# Patient Record
Sex: FEMALE | Race: WHITE | Employment: UNEMPLOYED | URBAN - METROPOLITAN AREA
[De-identification: names, ages, dates, MRNs, and addresses within clinical notes are randomized per-mention and may not be internally consistent; named-entity substitution may affect disease eponyms.]

---

## 2017-06-26 ENCOUNTER — OFFICE VISIT (OUTPATIENT)
Dept: INTERNAL MEDICINE CLINIC | Facility: CLINIC | Age: 67
End: 2017-06-26

## 2017-06-26 VITALS
WEIGHT: 143.5 LBS | BODY MASS INDEX: 26.75 KG/M2 | HEIGHT: 61.5 IN | DIASTOLIC BLOOD PRESSURE: 76 MMHG | HEART RATE: 78 BPM | OXYGEN SATURATION: 97 % | TEMPERATURE: 98 F | SYSTOLIC BLOOD PRESSURE: 122 MMHG | RESPIRATION RATE: 18 BRPM

## 2017-06-26 DIAGNOSIS — E78.2 MIXED HYPERLIPIDEMIA: ICD-10-CM

## 2017-06-26 DIAGNOSIS — E03.9 HYPOTHYROIDISM (ACQUIRED): Primary | ICD-10-CM

## 2017-06-26 DIAGNOSIS — I10 ESSENTIAL HYPERTENSION: ICD-10-CM

## 2017-06-26 DIAGNOSIS — M48.061 SPINAL STENOSIS AT L4-L5 LEVEL: ICD-10-CM

## 2017-06-26 PROCEDURE — 80061 LIPID PANEL: CPT | Performed by: INTERNAL MEDICINE

## 2017-06-26 PROCEDURE — 80050 GENERAL HEALTH PANEL: CPT | Performed by: INTERNAL MEDICINE

## 2017-06-26 PROCEDURE — 99214 OFFICE O/P EST MOD 30 MIN: CPT | Performed by: INTERNAL MEDICINE

## 2017-06-26 PROCEDURE — 36415 COLL VENOUS BLD VENIPUNCTURE: CPT | Performed by: INTERNAL MEDICINE

## 2017-06-26 RX ORDER — MICONAZOLE NITRATE 20 MG/ML
TINCTURE TOPICAL
Refills: 2 | COMMUNITY
Start: 2017-05-03

## 2017-06-26 RX ORDER — PROPRANOLOL HYDROCHLORIDE 20 MG/1
20 TABLET ORAL 2 TIMES DAILY
Qty: 60 TABLET | Refills: 11 | Status: SHIPPED | OUTPATIENT
Start: 2017-06-26 | End: 2018-02-02

## 2017-06-26 RX ORDER — FLUTICASONE PROPIONATE 50 MCG
2 SPRAY, SUSPENSION (ML) NASAL DAILY
Qty: 1 BOTTLE | Refills: 11 | Status: SHIPPED | OUTPATIENT
Start: 2017-06-26 | End: 2018-07-10

## 2017-06-26 RX ORDER — ALENDRONATE SODIUM 70 MG/1
70 TABLET ORAL WEEKLY
Qty: 4 TABLET | Refills: 11 | Status: SHIPPED | OUTPATIENT
Start: 2017-06-26 | End: 2018-02-02

## 2017-06-26 RX ORDER — ERGOCALCIFEROL 1.25 MG/1
50000 CAPSULE ORAL WEEKLY
Qty: 30 CAPSULE | Refills: 11 | Status: SHIPPED | OUTPATIENT
Start: 2017-06-26 | End: 2018-02-02

## 2017-06-26 RX ORDER — GABAPENTIN 100 MG/1
100 CAPSULE ORAL 3 TIMES DAILY
Qty: 90 CAPSULE | Refills: 6 | Status: SHIPPED | OUTPATIENT
Start: 2017-06-26 | End: 2018-02-07

## 2017-06-26 RX ORDER — TOBRAMYCIN 3 MG/ML
2 SOLUTION/ DROPS OPHTHALMIC
Status: DISCONTINUED | OUTPATIENT
Start: 2017-06-26 | End: 2017-06-30

## 2017-06-26 RX ORDER — IBUPROFEN 400 MG/1
400 TABLET ORAL EVERY 6 HOURS PRN
Qty: 60 TABLET | Refills: 11 | Status: SHIPPED | OUTPATIENT
Start: 2017-06-26

## 2017-06-26 RX ORDER — LEVOTHYROXINE SODIUM 0.07 MG/1
75 TABLET ORAL
Qty: 30 TABLET | Refills: 11 | Status: SHIPPED | OUTPATIENT
Start: 2017-06-26 | End: 2018-02-02

## 2017-06-26 RX ORDER — SIMVASTATIN 10 MG
10 TABLET ORAL NIGHTLY
Qty: 30 TABLET | Refills: 11 | Status: SHIPPED | OUTPATIENT
Start: 2017-06-26 | End: 2018-02-02

## 2017-06-26 RX ORDER — ERYTHROMYCIN 5 MG/G
OINTMENT OPHTHALMIC
COMMUNITY
Start: 2017-06-22 | End: 2017-07-20

## 2017-06-26 RX ORDER — GABAPENTIN 100 MG/1
100 CAPSULE ORAL 3 TIMES DAILY
COMMUNITY
End: 2017-06-26

## 2017-06-26 NOTE — PROGRESS NOTES
HPI:    Patient ID: Sang Esteban is a 77year old female.     HPI    Review of Systems         Current Outpatient Prescriptions:  erythromycin 5 MG/GM Ophthalmic Ointment  Disp:  Rfl:    CHANTIX STARTING MONTH IGNACIA 0.5 MG X 11 & 1 MG X 42 Oral Tab TAKE AS

## 2017-06-26 NOTE — PROGRESS NOTES
HPI:    Patient ID: Art Townsend is a 77year old female. HPI    Review of Systems         Current Outpatient Prescriptions:  fluconazole 150 MG Oral Tab TAKE 1 TABLET (150 MG TOTAL) BY MOUTH ONCE. Disp: 1 tablet Rfl: 3   CHANTIX STARTING MONTH IGNACIA 0. encounter    Imaging & Referrals:  None       SD#[id number]

## 2017-06-26 NOTE — PROGRESS NOTES
HPI:    Patient ID: Sue Luther is a 77year old female. HPIPt here for a fu on hypothyroidism spinal stenosis and osteoporosis. Review of Systems   Constitutional: Positive for fatigue. Negative for unexpected weight change.    HENT: Positive for Cap TAKE ONE CAPSULE BY MOUTH TWICE A DAY Disp:  Rfl: 0   Doxycycline Hyclate (VIBRAMYCIN) 100 MG Oral Cap Take 100 mg by mouth once daily. Disp:  Rfl: 1   aspirin 81 MG Oral Tab Take 81 mg by mouth daily.  Disp:  Rfl:    FUNGOID TINCTURE 2 % External Solut Visit:  Signed Prescriptions Disp Refills    Alendronate Sodium 70 MG Oral Tab 4 tablet 11      Sig: Take 1 tablet (70 mg total) by mouth once a week.       ergocalciferol 43242 units Oral Cap 30 capsule 11      Sig: Take 1 capsule (50,000 Units total) by m

## 2017-06-26 NOTE — PROGRESS NOTES
HPI:    Patient ID: Tye Guillory is a 77year old female. HPI    Review of Systems         Current Outpatient Prescriptions:  fluconazole 150 MG Oral Tab TAKE 1 TABLET (150 MG TOTAL) BY MOUTH ONCE. Disp: 1 tablet Rfl: 3   CHANTIX STARTING MONTH IGNACIA 0. encounter    Imaging & Referrals:  None       #[id number]

## 2017-07-20 RX ORDER — ERYTHROMYCIN 5 MG/G
1 OINTMENT OPHTHALMIC EVERY 6 HOURS
Qty: 1 TUBE | Refills: 3 | Status: SHIPPED | OUTPATIENT
Start: 2017-07-20 | End: 2018-08-14

## 2017-07-20 NOTE — TELEPHONE ENCOUNTER
Pt's daughter is calling to say that the Dr sent over the eye drops med to the Pharmacy / [facility].

## 2018-01-15 PROBLEM — H00.019 HORDEOLUM: Status: ACTIVE | Noted: 2017-06-22

## 2018-02-02 ENCOUNTER — OFFICE VISIT (OUTPATIENT)
Dept: INTERNAL MEDICINE CLINIC | Facility: CLINIC | Age: 68
End: 2018-02-02

## 2018-02-02 VITALS
HEIGHT: 61.5 IN | RESPIRATION RATE: 17 BRPM | OXYGEN SATURATION: 98 % | BODY MASS INDEX: 26.21 KG/M2 | TEMPERATURE: 98 F | DIASTOLIC BLOOD PRESSURE: 76 MMHG | WEIGHT: 140.63 LBS | SYSTOLIC BLOOD PRESSURE: 128 MMHG | HEART RATE: 69 BPM

## 2018-02-02 DIAGNOSIS — E03.9 ACQUIRED HYPOTHYROIDISM: ICD-10-CM

## 2018-02-02 DIAGNOSIS — E78.2 MIXED HYPERLIPIDEMIA: ICD-10-CM

## 2018-02-02 DIAGNOSIS — M54.17 LUMBOSACRAL RADICULOPATHY: Primary | ICD-10-CM

## 2018-02-02 DIAGNOSIS — I10 ESSENTIAL HYPERTENSION: ICD-10-CM

## 2018-02-02 DIAGNOSIS — E55.9 VITAMIN D DEFICIENCY: ICD-10-CM

## 2018-02-02 PROBLEM — H00.019 HORDEOLUM: Status: RESOLVED | Noted: 2017-06-22 | Resolved: 2018-02-02

## 2018-02-02 LAB
ALBUMIN SERPL BCP-MCNC: 3.8 G/DL (ref 3.5–4.8)
ALBUMIN/GLOB SERPL: 1.5 {RATIO} (ref 1–2)
ALP SERPL-CCNC: 50 U/L (ref 32–100)
ALT SERPL-CCNC: 13 U/L (ref 14–54)
ANION GAP SERPL CALC-SCNC: 8 MMOL/L (ref 0–18)
AST SERPL-CCNC: 15 U/L (ref 15–41)
BASOPHILS # BLD: 0.1 K/UL (ref 0–0.2)
BASOPHILS NFR BLD: 1 %
BILIRUB SERPL-MCNC: 0.5 MG/DL (ref 0.3–1.2)
BUN SERPL-MCNC: 10 MG/DL (ref 8–20)
BUN/CREAT SERPL: 16.7 (ref 10–20)
CALCIUM SERPL-MCNC: 9.2 MG/DL (ref 8.5–10.5)
CHLORIDE SERPL-SCNC: 108 MMOL/L (ref 95–110)
CHOLEST SERPL-MCNC: 204 MG/DL (ref 110–200)
CO2 SERPL-SCNC: 24 MMOL/L (ref 22–32)
CREAT SERPL-MCNC: 0.6 MG/DL (ref 0.5–1.5)
EOSINOPHIL # BLD: 0 K/UL (ref 0–0.7)
EOSINOPHIL NFR BLD: 1 %
ERYTHROCYTE [DISTWIDTH] IN BLOOD BY AUTOMATED COUNT: 13.1 % (ref 11–15)
GLOBULIN PLAS-MCNC: 2.5 G/DL (ref 2.5–3.7)
GLUCOSE SERPL-MCNC: 99 MG/DL (ref 70–99)
HCT VFR BLD AUTO: 44.4 % (ref 35–48)
HDLC SERPL-MCNC: 36 MG/DL
HGB BLD-MCNC: 14.9 G/DL (ref 12–16)
LDLC SERPL CALC-MCNC: 129 MG/DL (ref 0–99)
LYMPHOCYTES # BLD: 1.2 K/UL (ref 1–4)
LYMPHOCYTES NFR BLD: 19 %
MCH RBC QN AUTO: 31.2 PG (ref 27–32)
MCHC RBC AUTO-ENTMCNC: 33.6 G/DL (ref 32–37)
MCV RBC AUTO: 93 FL (ref 80–100)
MONOCYTES # BLD: 0.5 K/UL (ref 0–1)
MONOCYTES NFR BLD: 8 %
NEUTROPHILS # BLD AUTO: 4.3 K/UL (ref 1.8–7.7)
NEUTROPHILS NFR BLD: 71 %
NONHDLC SERPL-MCNC: 168 MG/DL
OSMOLALITY UR CALC.SUM OF ELEC: 289 MOSM/KG (ref 275–295)
PLATELET # BLD AUTO: 214 K/UL (ref 140–400)
PMV BLD AUTO: 8.9 FL (ref 7.4–10.3)
POTASSIUM SERPL-SCNC: 4.6 MMOL/L (ref 3.3–5.1)
PROT SERPL-MCNC: 6.3 G/DL (ref 5.9–8.4)
RBC # BLD AUTO: 4.77 M/UL (ref 3.7–5.4)
SODIUM SERPL-SCNC: 140 MMOL/L (ref 136–144)
TRIGL SERPL-MCNC: 194 MG/DL (ref 1–149)
TSH SERPL-ACNC: 3.13 UIU/ML (ref 0.45–5.33)
WBC # BLD AUTO: 6 K/UL (ref 4–11)

## 2018-02-02 PROCEDURE — 99214 OFFICE O/P EST MOD 30 MIN: CPT | Performed by: INTERNAL MEDICINE

## 2018-02-02 PROCEDURE — 80050 GENERAL HEALTH PANEL: CPT | Performed by: INTERNAL MEDICINE

## 2018-02-02 PROCEDURE — 82306 VITAMIN D 25 HYDROXY: CPT | Performed by: INTERNAL MEDICINE

## 2018-02-02 PROCEDURE — 80061 LIPID PANEL: CPT | Performed by: INTERNAL MEDICINE

## 2018-02-02 PROCEDURE — 36415 COLL VENOUS BLD VENIPUNCTURE: CPT | Performed by: INTERNAL MEDICINE

## 2018-02-02 RX ORDER — PROPRANOLOL HYDROCHLORIDE 20 MG/1
20 TABLET ORAL 2 TIMES DAILY
Qty: 60 TABLET | Refills: 11 | Status: SHIPPED | OUTPATIENT
Start: 2018-02-02 | End: 2018-11-20

## 2018-02-02 RX ORDER — ERGOCALCIFEROL 1.25 MG/1
50000 CAPSULE ORAL WEEKLY
Qty: 30 CAPSULE | Refills: 11 | Status: SHIPPED | OUTPATIENT
Start: 2018-02-02 | End: 2018-08-14

## 2018-02-02 RX ORDER — LEVOTHYROXINE SODIUM 0.07 MG/1
75 TABLET ORAL
Qty: 30 TABLET | Refills: 11 | Status: SHIPPED | OUTPATIENT
Start: 2018-02-02 | End: 2018-11-20

## 2018-02-02 RX ORDER — ALENDRONATE SODIUM 70 MG/1
70 TABLET ORAL WEEKLY
Qty: 4 TABLET | Refills: 11 | Status: SHIPPED | OUTPATIENT
Start: 2018-02-02 | End: 2018-08-14

## 2018-02-02 RX ORDER — SIMVASTATIN 10 MG
10 TABLET ORAL NIGHTLY
Qty: 30 TABLET | Refills: 11 | Status: SHIPPED | OUTPATIENT
Start: 2018-02-02 | End: 2018-11-20

## 2018-02-02 NOTE — PROGRESS NOTES
HPI:    Patient ID: Palmira Krabbe is a 79year old female. Review of Systems         Current Outpatient Prescriptions:  erythromycin 5 MG/GM Ophthalmic Ointment Place 1 Application into both eyes every 6 (six) hours.  Disp: 1 Tube Rfl: 3   FUNGOID T Visit:  No prescriptions requested or ordered in this encounter    Imaging & Referrals:  None         TIME COMPONENT:       Vargas Lange MD  2/2/2018

## 2018-02-02 NOTE — PROGRESS NOTES
Pt's  verified  Pt presented for a fasting blood draw. Per Dr Bambi Steinberg ordered VIT D TSHR CBC LIPID CMP. Pt tolerated venipuncture well,specimens drawn from RT  arm  and sent out to [address] [place] [address] lab for testing.

## 2018-02-02 NOTE — PROGRESS NOTES
HPI:    Patient ID: Beny Trevino is a 79year old female. Pt here for fu on spinal stenosis having flare upn currnetly. [facility] fu on hypothyroidism  htn and lipids. Last imaging of the spine was in 2009.     Review of Systems   Constitutional: total) by mouth every 6 (six) hours as needed for Pain.  Disp: 60 tablet Rfl: 11   Levothyroxine Sodium 75 MCG Oral Tab Take 1 tablet (75 mcg total) by mouth before breakfast. Disp: 30 tablet Rfl: 11   Propranolol HCl 20 MG Oral Tab Take 1 tablet (20 mg tot diagnosis) get new Mri imaging  And refer to pain clinic  Essential hypertension controlled with inderal rx  Mixed hyperlipidemia on zocor  Acquired hypothyroidism on levothyroxine.       Orders Placed This Encounter      Comp Metabolic Panel (14) [E]

## 2018-02-05 LAB — 25(OH)D3 SERPL-MCNC: 30.2 NG/ML

## 2018-02-07 RX ORDER — GABAPENTIN 100 MG/1
CAPSULE ORAL
Qty: 90 CAPSULE | Refills: 0 | Status: SHIPPED | OUTPATIENT
Start: 2018-02-07 | End: 2018-03-13

## 2018-02-12 ENCOUNTER — TELEPHONE (OUTPATIENT)
Dept: INTERNAL MEDICINE CLINIC | Facility: CLINIC | Age: 68
End: 2018-02-12

## 2018-02-12 NOTE — TELEPHONE ENCOUNTER
Spoke with the patient daughter regarding the patient issue.  Dr Bambi Steinberg had ordered an MRI, The patient received a denial letter from the insurance regarding no coverage os the requested test.

## 2018-02-12 NOTE — TELEPHONE ENCOUNTER
Patient's daughter is requesting  Call back from Dr. Yoana Son about prior authorization. [phone].

## 2018-02-12 NOTE — TELEPHONE ENCOUNTER
Sent message to the referral department for feedback from the insurance will follow up with their response with Dr Leopold Bunk

## 2018-03-02 ENCOUNTER — TELEPHONE (OUTPATIENT)
Dept: INTERNAL MEDICINE CLINIC | Facility: CLINIC | Age: 68
End: 2018-03-02

## 2018-03-02 NOTE — TELEPHONE ENCOUNTER
Patient's daughter states an MRI was not approved by insurance, she wants to know if the doctor can just order an x-ray, please advise. She asked if Dr Blossom Martinez could order it.

## 2018-03-02 NOTE — TELEPHONE ENCOUNTER
Pt's  verified  Called Pt and spoke with daughter Luna Braun re: covering MD not in agreement to ordering xray since they have not seen Pt for this issue- informed her I would ask Temi Thompson about this when she return to the office on Tuesday and will rell

## 2018-03-07 ENCOUNTER — TELEPHONE (OUTPATIENT)
Dept: INTERNAL MEDICINE CLINIC | Facility: CLINIC | Age: 68
End: 2018-03-07

## 2018-03-07 DIAGNOSIS — M54.50 LUMBAR PAIN: Primary | ICD-10-CM

## 2018-03-13 ENCOUNTER — TELEPHONE (OUTPATIENT)
Dept: INTERNAL MEDICINE CLINIC | Facility: CLINIC | Age: 68
End: 2018-03-13

## 2018-03-13 RX ORDER — GABAPENTIN 100 MG/1
CAPSULE ORAL
Qty: 90 CAPSULE | Refills: 0 | Status: SHIPPED | OUTPATIENT
Start: 2018-03-13 | End: 2018-06-19

## 2018-03-13 NOTE — TELEPHONE ENCOUNTER
Dtr called wanting for xr lumbar [facility].  Order faxed per dtr request.

## 2018-03-16 ENCOUNTER — TELEPHONE (OUTPATIENT)
Dept: INTERNAL MEDICINE CLINIC | Facility: CLINIC | Age: 68
End: 2018-03-16

## 2018-03-16 DIAGNOSIS — M47.16 OTHER SPONDYLOSIS WITH MYELOPATHY, LUMBAR REGION: Primary | ICD-10-CM

## 2018-03-27 ENCOUNTER — APPOINTMENT (OUTPATIENT)
Dept: PHYSICAL THERAPY | Age: 68
End: 2018-03-27
Attending: INTERNAL MEDICINE
Payer: MEDICARE

## 2018-03-28 ENCOUNTER — TELEPHONE (OUTPATIENT)
Dept: INTERNAL MEDICINE CLINIC | Facility: CLINIC | Age: 68
End: 2018-03-28

## 2018-03-28 NOTE — TELEPHONE ENCOUNTER
Patient's daughter states she has back and hip pain. X-rays on file, needs to be seen. Please advise.

## 2018-03-28 NOTE — TELEPHONE ENCOUNTER
Pt's  verified  Called Pt's daughter per Dr. Sade Vazquez and offered to add to schedule for 1130 am today  - she refused stating she has an appt with another MD today- I offered to schedule to be seen with Dr. Zahra Tee tomorrow at 56 or 1140 am since Pt's d

## 2018-04-03 ENCOUNTER — APPOINTMENT (OUTPATIENT)
Dept: PHYSICAL THERAPY | Age: 68
End: 2018-04-03
Attending: INTERNAL MEDICINE
Payer: MEDICARE

## 2018-04-05 ENCOUNTER — APPOINTMENT (OUTPATIENT)
Dept: PHYSICAL THERAPY | Age: 68
End: 2018-04-05
Attending: INTERNAL MEDICINE
Payer: MEDICARE

## 2018-04-10 ENCOUNTER — APPOINTMENT (OUTPATIENT)
Dept: PHYSICAL THERAPY | Age: 68
End: 2018-04-10
Attending: INTERNAL MEDICINE
Payer: MEDICARE

## 2018-04-12 ENCOUNTER — APPOINTMENT (OUTPATIENT)
Dept: PHYSICAL THERAPY | Age: 68
End: 2018-04-12
Attending: INTERNAL MEDICINE
Payer: MEDICARE

## 2018-04-17 ENCOUNTER — APPOINTMENT (OUTPATIENT)
Dept: PHYSICAL THERAPY | Age: 68
End: 2018-04-17
Attending: INTERNAL MEDICINE
Payer: MEDICARE

## 2018-04-19 ENCOUNTER — APPOINTMENT (OUTPATIENT)
Dept: PHYSICAL THERAPY | Age: 68
End: 2018-04-19
Attending: INTERNAL MEDICINE
Payer: MEDICARE

## 2018-04-24 ENCOUNTER — APPOINTMENT (OUTPATIENT)
Dept: PHYSICAL THERAPY | Age: 68
End: 2018-04-24
Attending: INTERNAL MEDICINE
Payer: MEDICARE

## 2018-06-19 ENCOUNTER — OFFICE VISIT (OUTPATIENT)
Dept: INTERNAL MEDICINE CLINIC | Facility: CLINIC | Age: 68
End: 2018-06-19

## 2018-06-19 VITALS
BODY MASS INDEX: 26.47 KG/M2 | WEIGHT: 142 LBS | TEMPERATURE: 98 F | HEIGHT: 61.5 IN | RESPIRATION RATE: 17 BRPM | HEART RATE: 62 BPM | SYSTOLIC BLOOD PRESSURE: 132 MMHG | OXYGEN SATURATION: 98 % | DIASTOLIC BLOOD PRESSURE: 66 MMHG

## 2018-06-19 DIAGNOSIS — E03.8 OTHER SPECIFIED HYPOTHYROIDISM: ICD-10-CM

## 2018-06-19 DIAGNOSIS — M54.17 LUMBOSACRAL RADICULOPATHY: ICD-10-CM

## 2018-06-19 DIAGNOSIS — E78.2 MIXED HYPERLIPIDEMIA: Primary | ICD-10-CM

## 2018-06-19 DIAGNOSIS — K21.9 GASTROESOPHAGEAL REFLUX DISEASE WITHOUT ESOPHAGITIS: ICD-10-CM

## 2018-06-19 DIAGNOSIS — R05.9 COUGH IN ADULT PATIENT: ICD-10-CM

## 2018-06-19 PROCEDURE — 80053 COMPREHEN METABOLIC PANEL: CPT | Performed by: INTERNAL MEDICINE

## 2018-06-19 PROCEDURE — 84443 ASSAY THYROID STIM HORMONE: CPT | Performed by: INTERNAL MEDICINE

## 2018-06-19 PROCEDURE — 99214 OFFICE O/P EST MOD 30 MIN: CPT | Performed by: INTERNAL MEDICINE

## 2018-06-19 PROCEDURE — 80061 LIPID PANEL: CPT | Performed by: INTERNAL MEDICINE

## 2018-06-19 PROCEDURE — 36415 COLL VENOUS BLD VENIPUNCTURE: CPT | Performed by: INTERNAL MEDICINE

## 2018-06-19 RX ORDER — HYDROCODONE BITARTRATE AND ACETAMINOPHEN 5; 325 MG/1; MG/1
1 TABLET ORAL EVERY 6 HOURS PRN
Qty: 30 TABLET | Refills: 0 | Status: SHIPPED | OUTPATIENT
Start: 2018-06-19 | End: 2021-03-05

## 2018-06-19 RX ORDER — ASPIRIN 81 MG/1
81 TABLET ORAL DAILY
Qty: 30 TABLET | Refills: 11 | Status: SHIPPED | OUTPATIENT
Start: 2018-06-19 | End: 2018-11-20

## 2018-06-19 RX ORDER — OMEPRAZOLE 20 MG/1
20 CAPSULE, DELAYED RELEASE ORAL
Qty: 30 CAPSULE | Refills: 3 | Status: SHIPPED | OUTPATIENT
Start: 2018-06-19 | End: 2018-10-04

## 2018-06-19 NOTE — PROGRESS NOTES
Pt's  verified  Pt presented for a fasting blood draw. Per DR Stephanie Arrington ordered TSHR CMP LIPID. Pt tolerated venipuncture well,specimens drawn from RT  arm  and sent out to [address] [place] [address] lab for testing.

## 2018-06-19 NOTE — PROGRESS NOTES
HPI:    Patient ID: Corona Aparicio is a 79year old female. Pt here for fu on severe lumbar radiculopathy- has had a plain film xray and 6 weeks of PT. Has only had minimal improvement. Never took Gabapentin rx due to irrational fear of side effects. tablet Rfl: 11   erythromycin 5 MG/GM Ophthalmic Ointment Place 1 Application into both eyes every 6 (six) hours.  Disp: 1 Tube Rfl: 3   FUNGOID TINCTURE 2 % External Solution APPLY AA D Disp:  Rfl: 2   Fluticasone Propionate 50 MCG/ACT Nasal Suspension 2 s patient check cxr - smoking cessation discussed w pt  Gastroesophageal reflux disease without esophagitis omeprazole 20 mg daily    Orders Placed This Encounter      Comp Metabolic Panel (14) [E]      Lipid Panel [E]      TSH W Reflex To Free T4 [E]    Med

## 2018-07-10 RX ORDER — FLUTICASONE PROPIONATE 50 MCG
SPRAY, SUSPENSION (ML) NASAL
Qty: 2 BOTTLE | Refills: 2 | Status: SHIPPED | OUTPATIENT
Start: 2018-07-10 | End: 2019-11-24

## 2018-07-11 RX ORDER — SIMVASTATIN 10 MG
TABLET ORAL
Qty: 30 TABLET | Refills: 0 | Status: SHIPPED | OUTPATIENT
Start: 2018-07-11 | End: 2018-08-14

## 2018-07-11 RX ORDER — LEVOTHYROXINE SODIUM 0.07 MG/1
TABLET ORAL
Qty: 30 TABLET | Refills: 0 | Status: SHIPPED | OUTPATIENT
Start: 2018-07-11 | End: 2018-08-14

## 2018-07-11 RX ORDER — PROPRANOLOL HYDROCHLORIDE 20 MG/1
TABLET ORAL
Qty: 60 TABLET | Refills: 0 | Status: SHIPPED | OUTPATIENT
Start: 2018-07-11 | End: 2018-08-14

## 2018-07-11 RX ORDER — ALENDRONATE SODIUM 70 MG/1
TABLET ORAL
Qty: 4 TABLET | Refills: 0 | Status: SHIPPED | OUTPATIENT
Start: 2018-07-11 | End: 2018-11-20

## 2018-08-10 ENCOUNTER — TELEPHONE (OUTPATIENT)
Dept: INTERNAL MEDICINE CLINIC | Facility: CLINIC | Age: 68
End: 2018-08-10

## 2018-08-10 NOTE — TELEPHONE ENCOUNTER
Daughter calling says mother is not feeling well an needs to see Dr. Rachel East says she does not want her to see another doctor, informed her Dr. Rachel East is not available to see her daughter said \"can you squeeze her in\" informed her I am not authorized to d

## 2018-08-14 ENCOUNTER — OFFICE VISIT (OUTPATIENT)
Dept: INTERNAL MEDICINE CLINIC | Facility: CLINIC | Age: 68
End: 2018-08-14
Payer: MEDICAID

## 2018-08-14 VITALS
RESPIRATION RATE: 12 BRPM | HEIGHT: 61.5 IN | SYSTOLIC BLOOD PRESSURE: 138 MMHG | DIASTOLIC BLOOD PRESSURE: 86 MMHG | BODY MASS INDEX: 25.35 KG/M2 | WEIGHT: 136 LBS | OXYGEN SATURATION: 99 % | TEMPERATURE: 98 F | HEART RATE: 64 BPM

## 2018-08-14 DIAGNOSIS — E55.9 VITAMIN D DEFICIENCY: ICD-10-CM

## 2018-08-14 DIAGNOSIS — E03.9 ACQUIRED HYPOTHYROIDISM: ICD-10-CM

## 2018-08-14 DIAGNOSIS — M54.17 LUMBOSACRAL RADICULOPATHY: Primary | ICD-10-CM

## 2018-08-14 PROCEDURE — 99213 OFFICE O/P EST LOW 20 MIN: CPT | Performed by: INTERNAL MEDICINE

## 2018-08-14 RX ORDER — ERGOCALCIFEROL 1.25 MG/1
50000 CAPSULE ORAL WEEKLY
Qty: 30 CAPSULE | Refills: 11 | Status: SHIPPED | OUTPATIENT
Start: 2018-08-14 | End: 2019-09-08

## 2018-08-14 NOTE — PROGRESS NOTES
HPI:    Patient ID: Salvador Brunson is a 79year old female. Pt here for fu on lumbar radiculopathy. Has yet to have Mri of lumbar spine due to insurance problems. Has had little relief from hydrocodone rx.   Would like to get a handicapped licence plac total) by mouth daily. Disp: 30 tablet Rfl: 11   HYDROcodone-acetaminophen (NORCO) 5-325 MG Oral Tab Take 1 tablet by mouth every 6 (six) hours as needed for Pain.  Disp: 30 tablet Rfl: 0   simvastatin 10 MG Oral Tab Take 1 tablet (10 mg total) by mouth nig ergocalciferol 27108 units Oral Cap 30 capsule 11      Sig: Take 1 capsule (50,000 Units total) by mouth once a week.            Imaging & Referrals:  None               Leona Ulloa MD  8/14/2018

## 2018-08-17 ENCOUNTER — TELEPHONE (OUTPATIENT)
Dept: INTERNAL MEDICINE CLINIC | Facility: CLINIC | Age: 68
End: 2018-08-17

## 2018-08-17 NOTE — TELEPHONE ENCOUNTER
Patient's daughter was here for an office visit Gene Morales and she said that Dr. Josiah Habermann did not call in medication for patient Leeanntanner Jody, please call in medication to pharmacy.

## 2018-10-05 ENCOUNTER — TELEPHONE (OUTPATIENT)
Dept: INTERNAL MEDICINE CLINIC | Facility: CLINIC | Age: 68
End: 2018-10-05

## 2018-10-05 RX ORDER — OMEPRAZOLE 20 MG/1
CAPSULE, DELAYED RELEASE ORAL
Qty: 30 CAPSULE | Refills: 0 | Status: SHIPPED | OUTPATIENT
Start: 2018-10-05 | End: 2018-11-13

## 2018-10-10 NOTE — TELEPHONE ENCOUNTER
GLENN SETH (Key: [id number])  Omeprazole 20MG OR TBEC  Status: PA Response - Denied    Created: October 5th, 2018    Sent: October 5th, 2018    Open  Archive

## 2018-11-12 RX ORDER — ALENDRONATE SODIUM 70 MG/1
TABLET ORAL
Qty: 4 TABLET | Refills: 0 | Status: SHIPPED | OUTPATIENT
Start: 2018-11-12 | End: 2018-11-20

## 2018-11-13 ENCOUNTER — TELEPHONE (OUTPATIENT)
Dept: INTERNAL MEDICINE CLINIC | Facility: CLINIC | Age: 68
End: 2018-11-13

## 2018-11-13 NOTE — TELEPHONE ENCOUNTER
Plan does not cover Omeprazole 20 mg. Would you like to change to a different medication or start PA?

## 2018-11-20 ENCOUNTER — OFFICE VISIT (OUTPATIENT)
Dept: INTERNAL MEDICINE CLINIC | Facility: CLINIC | Age: 68
End: 2018-11-20
Payer: MEDICAID

## 2018-11-20 VITALS
BODY MASS INDEX: 25.91 KG/M2 | WEIGHT: 139 LBS | TEMPERATURE: 98 F | SYSTOLIC BLOOD PRESSURE: 118 MMHG | OXYGEN SATURATION: 99 % | DIASTOLIC BLOOD PRESSURE: 76 MMHG | HEART RATE: 59 BPM | HEIGHT: 61.5 IN

## 2018-11-20 DIAGNOSIS — E03.8 OTHER SPECIFIED HYPOTHYROIDISM: ICD-10-CM

## 2018-11-20 DIAGNOSIS — E78.2 MIXED HYPERLIPIDEMIA: ICD-10-CM

## 2018-11-20 DIAGNOSIS — M25.511 ACUTE PAIN OF RIGHT SHOULDER: Primary | ICD-10-CM

## 2018-11-20 PROCEDURE — 99214 OFFICE O/P EST MOD 30 MIN: CPT | Performed by: INTERNAL MEDICINE

## 2018-11-20 RX ORDER — PROPRANOLOL HYDROCHLORIDE 20 MG/1
20 TABLET ORAL 2 TIMES DAILY
Qty: 60 TABLET | Refills: 11 | Status: SHIPPED | OUTPATIENT
Start: 2018-11-20 | End: 2019-11-23

## 2018-11-20 RX ORDER — ALENDRONATE SODIUM 70 MG/1
70 TABLET ORAL
Qty: 4 TABLET | Refills: 11 | Status: SHIPPED | OUTPATIENT
Start: 2018-11-20 | End: 2019-11-23

## 2018-11-20 RX ORDER — SIMVASTATIN 10 MG
10 TABLET ORAL NIGHTLY
Qty: 30 TABLET | Refills: 11 | Status: SHIPPED | OUTPATIENT
Start: 2018-11-20 | End: 2019-11-23

## 2018-11-20 RX ORDER — LEVOTHYROXINE SODIUM 0.07 MG/1
75 TABLET ORAL
Qty: 30 TABLET | Refills: 11 | Status: SHIPPED | OUTPATIENT
Start: 2018-11-20 | End: 2019-11-23

## 2018-11-20 RX ORDER — ASPIRIN 81 MG/1
81 TABLET ORAL DAILY
Qty: 30 TABLET | Refills: 11 | Status: SHIPPED | OUTPATIENT
Start: 2018-11-20 | End: 2019-10-21

## 2018-11-20 NOTE — PROGRESS NOTES
HPI:    Patient ID: Palmira Krabbe is a 79year old female. Pt here for localized pain in the right subacromial region for 10 days. Began after some lifting. Has tried some warming adhesive patches with partial relief.     Review of Systems   Constitut (NORCO) 5-325 MG Oral Tab Take 1 tablet by mouth every 6 (six) hours as needed for Pain. Disp: 30 tablet Rfl: 0   simvastatin 10 MG Oral Tab Take 1 tablet (10 mg total) by mouth nightly.  Disp: 30 tablet Rfl: 11   Propranolol HCl 20 MG Oral Tab Take 1 table Prescriptions      No prescriptions requested or ordered in this encounter       Imaging & Referrals:  None                Vargas Lange MD  11/20/2018

## 2018-12-05 ENCOUNTER — HOSPITAL ENCOUNTER (OUTPATIENT)
Dept: GENERAL RADIOLOGY | Age: 68
Discharge: HOME | End: 2018-12-05
Attending: INTERNAL MEDICINE
Payer: MEDICAID

## 2018-12-05 ENCOUNTER — APPOINTMENT (OUTPATIENT)
Dept: LAB | Age: 68
End: 2018-12-05
Attending: INTERNAL MEDICINE
Payer: MEDICAID

## 2018-12-05 DIAGNOSIS — M25.511 ACUTE PAIN OF RIGHT SHOULDER: ICD-10-CM

## 2018-12-05 DIAGNOSIS — E03.8 OTHER SPECIFIED HYPOTHYROIDISM: ICD-10-CM

## 2018-12-05 DIAGNOSIS — E78.2 MIXED HYPERLIPIDEMIA: ICD-10-CM

## 2018-12-05 PROCEDURE — 84443 ASSAY THYROID STIM HORMONE: CPT

## 2018-12-05 PROCEDURE — 80061 LIPID PANEL: CPT

## 2018-12-05 PROCEDURE — 36415 COLL VENOUS BLD VENIPUNCTURE: CPT

## 2018-12-05 PROCEDURE — 73030 X-RAY EXAM OF SHOULDER: CPT | Performed by: INTERNAL MEDICINE

## 2019-01-04 ENCOUNTER — OFFICE VISIT (OUTPATIENT)
Dept: INTERNAL MEDICINE CLINIC | Facility: CLINIC | Age: 69
End: 2019-01-04
Payer: MEDICAID

## 2019-01-04 VITALS
HEIGHT: 61.5 IN | HEART RATE: 62 BPM | TEMPERATURE: 98 F | BODY MASS INDEX: 26.09 KG/M2 | DIASTOLIC BLOOD PRESSURE: 76 MMHG | SYSTOLIC BLOOD PRESSURE: 130 MMHG | WEIGHT: 140 LBS | OXYGEN SATURATION: 98 %

## 2019-01-04 DIAGNOSIS — M54.17 LUMBOSACRAL RADICULOPATHY: Primary | ICD-10-CM

## 2019-01-04 DIAGNOSIS — M15.9 GENERALIZED OSTEOARTHRITIS: ICD-10-CM

## 2019-01-04 DIAGNOSIS — E03.9 ACQUIRED HYPOTHYROIDISM: ICD-10-CM

## 2019-01-04 PROCEDURE — 99214 OFFICE O/P EST MOD 30 MIN: CPT | Performed by: INTERNAL MEDICINE

## 2019-01-04 RX ORDER — CYCLOBENZAPRINE HCL 10 MG
10 TABLET ORAL NIGHTLY
Qty: 30 TABLET | Refills: 3 | Status: SHIPPED | OUTPATIENT
Start: 2019-01-04 | End: 2019-01-24

## 2019-01-04 RX ORDER — ALBUTEROL SULFATE 90 UG/1
2 AEROSOL, METERED RESPIRATORY (INHALATION) EVERY 4 HOURS PRN
Qty: 1 INHALER | Refills: 6 | Status: SHIPPED | OUTPATIENT
Start: 2019-01-04 | End: 2020-05-20

## 2019-01-04 NOTE — PROGRESS NOTES
HPI:    Patient ID: Kathrine Wilde is a 76year old female. Pt here for evaluation of generalized joint aches in the hips and right shoulder. The pains respond to Diclofenac. Has been having difficulty with sleep due to pains.   Has known OA throughout Sodium 40 MG Oral Tab EC Take 1 tablet (40 mg total) by mouth every morning before breakfast. Disp: 30 tablet Rfl: 6   ergocalciferol 15249 units Oral Cap Take 1 capsule (50,000 Units total) by mouth once a week.  Disp: 30 capsule Rfl: 11   FLUTICASONE PROP encounter.       Meds This Visit:  Requested Prescriptions      No prescriptions requested or ordered in this encounter       Imaging & Referrals:  None             Myron Mace MD  1/4/2019

## 2019-01-18 ENCOUNTER — OFFICE VISIT (OUTPATIENT)
Dept: INTERNAL MEDICINE CLINIC | Facility: CLINIC | Age: 69
End: 2019-01-18
Payer: COMMERCIAL

## 2019-01-18 VITALS
OXYGEN SATURATION: 98 % | HEART RATE: 83 BPM | DIASTOLIC BLOOD PRESSURE: 78 MMHG | BODY MASS INDEX: 26.47 KG/M2 | SYSTOLIC BLOOD PRESSURE: 124 MMHG | TEMPERATURE: 98 F | WEIGHT: 142 LBS | HEIGHT: 61.5 IN

## 2019-01-18 DIAGNOSIS — S20.211D CONTUSION OF RIGHT CHEST WALL, SUBSEQUENT ENCOUNTER: Primary | ICD-10-CM

## 2019-01-18 PROBLEM — S20.211A CONTUSION OF RIGHT CHEST WALL: Status: ACTIVE | Noted: 2019-01-18

## 2019-01-18 PROCEDURE — 99213 OFFICE O/P EST LOW 20 MIN: CPT | Performed by: INTERNAL MEDICINE

## 2019-01-18 RX ORDER — HYDROCODONE BITARTRATE AND ACETAMINOPHEN 5; 325 MG/1; MG/1
1 TABLET ORAL EVERY 6 HOURS PRN
Qty: 60 TABLET | Refills: 0 | Status: SHIPPED | OUTPATIENT
Start: 2019-01-18 | End: 2019-03-01
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
## 2019-02-01 ENCOUNTER — OFFICE VISIT (OUTPATIENT)
Dept: INTERNAL MEDICINE CLINIC | Facility: CLINIC | Age: 69
End: 2019-02-01

## 2019-02-01 VITALS
HEART RATE: 67 BPM | OXYGEN SATURATION: 100 % | HEIGHT: 61.5 IN | DIASTOLIC BLOOD PRESSURE: 80 MMHG | BODY MASS INDEX: 26.47 KG/M2 | WEIGHT: 142 LBS | SYSTOLIC BLOOD PRESSURE: 124 MMHG

## 2019-02-01 DIAGNOSIS — S20.211D CONTUSION OF RIGHT CHEST WALL, SUBSEQUENT ENCOUNTER: Primary | ICD-10-CM

## 2019-02-01 PROCEDURE — 99214 OFFICE O/P EST MOD 30 MIN: CPT | Performed by: INTERNAL MEDICINE

## 2019-02-01 RX ORDER — TRAMADOL HYDROCHLORIDE 50 MG/1
50 TABLET ORAL EVERY 6 HOURS PRN
Qty: 40 TABLET | Refills: 1 | Status: SHIPPED | OUTPATIENT
Start: 2019-02-01 | End: 2019-03-01
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
## 2019-03-01 ENCOUNTER — OFFICE VISIT (OUTPATIENT)
Dept: INTERNAL MEDICINE CLINIC | Facility: CLINIC | Age: 69
End: 2019-03-01

## 2019-03-01 VITALS
WEIGHT: 139 LBS | DIASTOLIC BLOOD PRESSURE: 80 MMHG | OXYGEN SATURATION: 98 % | HEART RATE: 69 BPM | HEIGHT: 61.5 IN | SYSTOLIC BLOOD PRESSURE: 116 MMHG | BODY MASS INDEX: 25.91 KG/M2

## 2019-03-01 DIAGNOSIS — S40.011D CONTUSION OF MULTIPLE SITES OF RIGHT SHOULDER, SUBSEQUENT ENCOUNTER: ICD-10-CM

## 2019-03-01 DIAGNOSIS — S20.211D CONTUSION OF RIGHT CHEST WALL, SUBSEQUENT ENCOUNTER: Primary | ICD-10-CM

## 2019-03-01 DIAGNOSIS — M54.17 LUMBOSACRAL RADICULOPATHY: ICD-10-CM

## 2019-03-01 PROCEDURE — 99214 OFFICE O/P EST MOD 30 MIN: CPT | Performed by: INTERNAL MEDICINE

## 2019-03-01 RX ORDER — FLUCONAZOLE 150 MG/1
150 TABLET ORAL ONCE
Qty: 2 TABLET | Refills: 0 | Status: SHIPPED | OUTPATIENT
Start: 2019-03-01 | End: 2019-03-01

## 2019-03-01 RX ORDER — TRAMADOL HYDROCHLORIDE 50 MG/1
50 TABLET ORAL EVERY 6 HOURS PRN
Qty: 40 TABLET | Refills: 1 | Status: SHIPPED | OUTPATIENT
Start: 2019-03-01
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
## 2019-04-05 ENCOUNTER — OFFICE VISIT (OUTPATIENT)
Dept: INTERNAL MEDICINE CLINIC | Facility: CLINIC | Age: 69
End: 2019-04-05

## 2019-04-05 VITALS
WEIGHT: 141 LBS | HEART RATE: 67 BPM | HEIGHT: 61.5 IN | SYSTOLIC BLOOD PRESSURE: 124 MMHG | BODY MASS INDEX: 26.28 KG/M2 | DIASTOLIC BLOOD PRESSURE: 80 MMHG | OXYGEN SATURATION: 98 %

## 2019-04-05 DIAGNOSIS — Z12.31 SCREENING MAMMOGRAM, ENCOUNTER FOR: ICD-10-CM

## 2019-04-05 DIAGNOSIS — M94.0 COSTOCHONDRITIS: Primary | ICD-10-CM

## 2019-04-05 PROCEDURE — 99214 OFFICE O/P EST MOD 30 MIN: CPT | Performed by: INTERNAL MEDICINE

## 2019-04-05 RX ORDER — CYCLOBENZAPRINE HCL 10 MG
10 TABLET ORAL 3 TIMES DAILY PRN
Qty: 30 TABLET | Refills: 0 | Status: SHIPPED | OUTPATIENT
Start: 2019-04-05

## 2019-04-05 RX ORDER — FLUCONAZOLE 150 MG/1
TABLET ORAL
Refills: 0 | COMMUNITY
Start: 2019-03-01 | End: 2021-04-20

## 2019-04-05 NOTE — PROGRESS NOTES
HPI:    Patient ID: Dallas Owen is a 76year old female. Pt here for a fu on myofascial pain now residually present in the left subscapular region . Has completed 2 back to back courses of PT with PD . Is taking Dclofenac prn for pain.  Is due for a IN EACH NOSTRIL DAILY Disp: 2 Bottle Rfl: 2   triamcinolone acetonide 0.1 % External Cream Apply topically 2 (two) times daily as needed.  Disp: 60 g Rfl: 3   HYDROcodone-acetaminophen (NORCO) 5-325 MG Oral Tab Take 1 tablet by mouth every 6 (six) hours as

## 2019-05-21 ENCOUNTER — TELEPHONE (OUTPATIENT)
Dept: INTERNAL MEDICINE CLINIC | Facility: CLINIC | Age: 69
End: 2019-05-21

## 2019-05-21 NOTE — TELEPHONE ENCOUNTER
Received medical records request from [facility]. [address] [facility] [address], [address]  P# [id number] L#[phone].  Request has been sent to [employer]

## 2019-05-29 ENCOUNTER — TELEPHONE (OUTPATIENT)
Dept: INTERNAL MEDICINE CLINIC | Facility: CLINIC | Age: 69
End: 2019-05-29

## 2019-05-29 NOTE — TELEPHONE ENCOUNTER
Pt's daughter called and states that Pt has been coughing for a couple of days and has been taking Sudafed, but the cough isn't any better. Pt thinks she might have bronchitis. She was wondering if she can be scheduled any time soon.

## 2019-05-31 ENCOUNTER — OFFICE VISIT (OUTPATIENT)
Dept: INTERNAL MEDICINE CLINIC | Facility: CLINIC | Age: 69
End: 2019-05-31
Payer: MEDICAID

## 2019-05-31 VITALS
SYSTOLIC BLOOD PRESSURE: 116 MMHG | WEIGHT: 143 LBS | OXYGEN SATURATION: 97 % | HEIGHT: 61.5 IN | BODY MASS INDEX: 26.65 KG/M2 | HEART RATE: 74 BPM | DIASTOLIC BLOOD PRESSURE: 78 MMHG

## 2019-05-31 DIAGNOSIS — J20.9 ACUTE BRONCHITIS, UNSPECIFIED ORGANISM: Primary | ICD-10-CM

## 2019-05-31 PROCEDURE — 99213 OFFICE O/P EST LOW 20 MIN: CPT | Performed by: INTERNAL MEDICINE

## 2019-05-31 RX ORDER — PROMETHAZINE HYDROCHLORIDE AND CODEINE PHOSPHATE 6.25; 1 MG/5ML; MG/5ML
2.5 SYRUP ORAL EVERY 4 HOURS PRN
Qty: 118 ML | Refills: 0 | Status: SHIPPED | OUTPATIENT
Start: 2019-05-31 | End: 2019-06-24 | Stop reason: ALTCHOICE

## 2019-05-31 RX ORDER — LEVOFLOXACIN 500 MG/1
500 TABLET, FILM COATED ORAL DAILY
Qty: 10 TABLET | Refills: 0 | Status: SHIPPED | OUTPATIENT
Start: 2019-05-31 | End: 2019-06-10

## 2019-05-31 NOTE — PROGRESS NOTES
HPI:    Patient ID: Prince Cabrera is a 76year old female. Pt here for evaluation of cough and chest congestion for last 4 days. Smoker. No fever or chills. Has been taking OTC cold remedy with no relief.     Review of Systems   Constitutional: Christopher Eckert Tab EC Take 1 tablet (40 mg total) by mouth every morning before breakfast. Disp: 30 tablet Rfl: 6   ergocalciferol 52274 units Oral Cap Take 1 capsule (50,000 Units total) by mouth once a week.  Disp: 30 capsule Rfl: 11   FLUTICASONE PROPIONATE 50 MCG/ACT mouth daily for 10 days.        Imaging & Referrals:  None         TIME COMPONENT:       Khadar Morales MD  5/31/2019

## 2019-06-17 ENCOUNTER — TELEPHONE (OUTPATIENT)
Dept: INTERNAL MEDICINE CLINIC | Facility: CLINIC | Age: 69
End: 2019-06-17

## 2019-06-17 NOTE — TELEPHONE ENCOUNTER
LVM: if in such pain should go to ER. Xrays would be needed, maybe she hs a blood clot. Do not know if she was in a different group's ER, so these would be necessary.
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
## 2019-06-20 ENCOUNTER — TELEPHONE (OUTPATIENT)
Dept: INTERNAL MEDICINE CLINIC | Facility: CLINIC | Age: 69
End: 2019-06-20
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
## 2019-06-24 ENCOUNTER — OFFICE VISIT (OUTPATIENT)
Dept: INTERNAL MEDICINE CLINIC | Facility: CLINIC | Age: 69
End: 2019-06-24
Payer: MEDICAID

## 2019-06-24 VITALS
HEART RATE: 72 BPM | BODY MASS INDEX: 27.18 KG/M2 | HEIGHT: 61.5 IN | WEIGHT: 145.81 LBS | DIASTOLIC BLOOD PRESSURE: 84 MMHG | OXYGEN SATURATION: 97 % | SYSTOLIC BLOOD PRESSURE: 124 MMHG

## 2019-06-24 DIAGNOSIS — G89.29 CHRONIC RIGHT SHOULDER PAIN: ICD-10-CM

## 2019-06-24 DIAGNOSIS — R25.2 MUSCLE CRAMPS: Primary | ICD-10-CM

## 2019-06-24 DIAGNOSIS — M25.511 CHRONIC RIGHT SHOULDER PAIN: ICD-10-CM

## 2019-06-24 PROCEDURE — 99214 OFFICE O/P EST MOD 30 MIN: CPT | Performed by: INTERNAL MEDICINE

## 2019-06-24 PROCEDURE — 80053 COMPREHEN METABOLIC PANEL: CPT | Performed by: INTERNAL MEDICINE

## 2019-06-24 PROCEDURE — 83735 ASSAY OF MAGNESIUM: CPT | Performed by: INTERNAL MEDICINE

## 2019-06-24 RX ORDER — IBUPROFEN 600 MG/1
600 TABLET ORAL EVERY 6 HOURS PRN
Qty: 60 TABLET | Refills: 1 | Status: SHIPPED | OUTPATIENT
Start: 2019-06-24

## 2019-06-24 RX ORDER — HYDROCODONE BITARTRATE AND ACETAMINOPHEN 5; 325 MG/1; MG/1
1 TABLET ORAL EVERY 6 HOURS PRN
Qty: 40 TABLET | Refills: 0 | Status: SHIPPED | OUTPATIENT
Start: 2019-06-24 | End: 2020-03-09
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
## 2019-07-17 ENCOUNTER — TELEPHONE (OUTPATIENT)
Dept: INTERNAL MEDICINE CLINIC | Facility: CLINIC | Age: 69
End: 2019-07-17

## 2019-07-17 NOTE — TELEPHONE ENCOUNTER
Daughter calling from an orthopedic's office demanding a referral be faxed to this doctor as Mother is having the appointment now.   Dr. Scot Harris referred her to Dr. Amanda Reed, but patient at a Dr. Aixa Brito [place] [address] [place] [address].  Asked why th

## 2019-07-17 NOTE — TELEPHONE ENCOUNTER
Called asking why papers not faxed to orthopedic yet. Educated patient's daughter on the process and roadblocks with requesting approvals and that we might not receive an answer up to 10 days from today.   Informed her that, although Dr. Nieves Burks accepts her

## 2019-07-18 ENCOUNTER — TELEPHONE (OUTPATIENT)
Dept: INTERNAL MEDICINE CLINIC | Facility: CLINIC | Age: 69
End: 2019-07-18

## 2019-07-18 NOTE — TELEPHONE ENCOUNTER
LVM: if \"claim\" call needs to do with insurance claims, Triage cannot help. If something else, can communicate vie MyChart or call office.

## 2019-09-09 RX ORDER — ERGOCALCIFEROL 1.25 MG/1
CAPSULE ORAL
Qty: 30 CAPSULE | Refills: 0 | Status: SHIPPED | OUTPATIENT
Start: 2019-09-09 | End: 2020-05-20

## 2019-09-09 NOTE — TELEPHONE ENCOUNTER
Requested Prescriptions     Pending Prescriptions Disp Refills   • ERGOCALCIFEROL 45612 units Oral Cap [Pharmacy Med Name: VITAMIN D2 50,000IU (ERGO) CAP RX] 30 capsule 0     Sig: TAKE ONE CAPSULE BY MOUTH ONCE A WEEK     Last office visit: 6-24-19  [facility]

## 2019-10-21 RX ORDER — ASPIRIN 81 MG/1
TABLET, COATED ORAL
Qty: 30 TABLET | Refills: 0 | Status: SHIPPED | OUTPATIENT
Start: 2019-10-21 | End: 2019-11-11

## 2019-10-21 NOTE — TELEPHONE ENCOUNTER
Requested Prescriptions     Pending Prescriptions Disp Refills   • ASPIRIN LOW DOSE 81 MG Oral Tab EC [Pharmacy Med Name: ASPIRIN 81MG EC LOW DOSE  TABLETS] 30 tablet 0     Sig: TAKE 1 TABLET BY MOUTH DAILY     Last office visit: 6-24-19  Medication last r

## 2019-11-10 RX ORDER — ASPIRIN 81 MG/1
TABLET, COATED ORAL
Qty: 30 TABLET | Refills: 0 | Status: CANCELLED | OUTPATIENT
Start: 2019-11-10

## 2019-11-11 RX ORDER — ASPIRIN 81 MG/1
TABLET, COATED ORAL
Qty: 30 TABLET | Refills: 0 | OUTPATIENT
Start: 2019-11-11

## 2019-11-15 ENCOUNTER — TELEPHONE (OUTPATIENT)
Dept: INTERNAL MEDICINE CLINIC | Facility: CLINIC | Age: 69
End: 2019-11-15

## 2019-11-15 NOTE — TELEPHONE ENCOUNTER
Coughing a lot, mucous coughed up. On Mucinex for several days, chest pain. Does not know if she hs a fever. Daughter would not tell me anything else. Laura Johnson can tell doctor when she sees her. \"    Mentioned that doctor is at 100% booked and will not be

## 2019-11-15 NOTE — TELEPHONE ENCOUNTER
PT is having chest pains, daughter says she thinks it is bronchitis  She wants her seen today, does not want to take her to [facility] because PT only speaks polish    Please call back and advise

## 2019-11-25 RX ORDER — SIMVASTATIN 10 MG
TABLET ORAL
Qty: 30 TABLET | Refills: 0 | Status: SHIPPED | OUTPATIENT
Start: 2019-11-25 | End: 2019-12-24

## 2019-11-25 RX ORDER — LEVOTHYROXINE SODIUM 0.07 MG/1
TABLET ORAL
Qty: 30 TABLET | Refills: 0 | Status: SHIPPED | OUTPATIENT
Start: 2019-11-25 | End: 2019-12-24

## 2019-11-25 RX ORDER — FLUTICASONE PROPIONATE 50 MCG
SPRAY, SUSPENSION (ML) NASAL
Qty: 1 BOTTLE | Refills: 0 | Status: SHIPPED | OUTPATIENT
Start: 2019-11-25 | End: 2020-12-21

## 2019-11-25 RX ORDER — ALENDRONATE SODIUM 70 MG/1
TABLET ORAL
Qty: 4 TABLET | Refills: 0 | Status: SHIPPED | OUTPATIENT
Start: 2019-11-25 | End: 2019-12-27

## 2019-11-25 RX ORDER — PROPRANOLOL HYDROCHLORIDE 20 MG/1
TABLET ORAL
Qty: 60 TABLET | Refills: 0 | Status: SHIPPED | OUTPATIENT
Start: 2019-11-25 | End: 2019-12-24

## 2019-12-24 RX ORDER — SIMVASTATIN 10 MG
TABLET ORAL
Qty: 30 TABLET | Refills: 0 | OUTPATIENT
Start: 2019-12-24

## 2019-12-24 RX ORDER — PROPRANOLOL HYDROCHLORIDE 20 MG/1
TABLET ORAL
Qty: 60 TABLET | Refills: 0 | OUTPATIENT
Start: 2019-12-24

## 2019-12-24 RX ORDER — LEVOTHYROXINE SODIUM 0.07 MG/1
TABLET ORAL
Qty: 30 TABLET | Refills: 0 | OUTPATIENT
Start: 2019-12-24

## 2019-12-24 NOTE — TELEPHONE ENCOUNTER
Requested Prescriptions     Pending Prescriptions Disp Refills   • ALENDRONATE 70 MG Oral Tab [Pharmacy Med Name: ALENDRONATE 70MG TABLETS] 4 tablet 0     Sig: TAKE 1 TABLET BY MOUTH EVERY WEEK     Last office visit: 6-24-19  Medication last refilled: 11-2

## 2019-12-27 RX ORDER — ALENDRONATE SODIUM 70 MG/1
TABLET ORAL
Qty: 4 TABLET | Refills: 0 | Status: SHIPPED | OUTPATIENT
Start: 2019-12-27 | End: 2020-01-27

## 2020-01-27 RX ORDER — ALENDRONATE SODIUM 70 MG/1
TABLET ORAL
Qty: 4 TABLET | Refills: 0 | Status: SHIPPED | OUTPATIENT
Start: 2020-01-27 | End: 2020-02-24

## 2020-01-27 NOTE — TELEPHONE ENCOUNTER
Last OV 06/24/19. No future appt scheduled.       Osteoporosis Medication Protocol Failed1/24 4:35 PM   DEXA scan within past 2 years

## 2020-02-24 RX ORDER — ALENDRONATE SODIUM 70 MG/1
TABLET ORAL
Qty: 4 TABLET | Refills: 0 | OUTPATIENT
Start: 2020-02-24

## 2020-03-09 ENCOUNTER — OFFICE VISIT (OUTPATIENT)
Dept: INTERNAL MEDICINE CLINIC | Facility: CLINIC | Age: 70
End: 2020-03-09
Payer: MEDICAID

## 2020-03-09 VITALS
OXYGEN SATURATION: 97 % | SYSTOLIC BLOOD PRESSURE: 132 MMHG | HEIGHT: 61.5 IN | BODY MASS INDEX: 25.91 KG/M2 | DIASTOLIC BLOOD PRESSURE: 80 MMHG | WEIGHT: 139 LBS | HEART RATE: 82 BPM

## 2020-03-09 DIAGNOSIS — E78.2 MIXED HYPERLIPIDEMIA: ICD-10-CM

## 2020-03-09 DIAGNOSIS — E03.9 ACQUIRED HYPOTHYROIDISM: ICD-10-CM

## 2020-03-09 DIAGNOSIS — J45.31 MILD PERSISTENT ASTHMATIC BRONCHITIS WITH ACUTE EXACERBATION: Primary | ICD-10-CM

## 2020-03-09 DIAGNOSIS — M54.17 LUMBOSACRAL RADICULOPATHY: ICD-10-CM

## 2020-03-09 LAB
ALBUMIN SERPL-MCNC: 3.5 G/DL (ref 3.4–5)
ALBUMIN/GLOB SERPL: 0.9 {RATIO} (ref 1–2)
ALP LIVER SERPL-CCNC: 78 U/L (ref 55–142)
ALT SERPL-CCNC: 22 U/L (ref 13–56)
ANION GAP SERPL CALC-SCNC: 6 MMOL/L (ref 0–18)
AST SERPL-CCNC: 12 U/L (ref 15–37)
BILIRUB SERPL-MCNC: 0.4 MG/DL (ref 0.1–2)
BUN BLD-MCNC: 16 MG/DL (ref 7–18)
BUN/CREAT SERPL: 20.5 (ref 10–20)
CALCIUM BLD-MCNC: 9.4 MG/DL (ref 8.5–10.1)
CHLORIDE SERPL-SCNC: 106 MMOL/L (ref 98–112)
CHOLEST SMN-MCNC: 195 MG/DL (ref ?–200)
CO2 SERPL-SCNC: 27 MMOL/L (ref 21–32)
CREAT BLD-MCNC: 0.78 MG/DL (ref 0.55–1.02)
GLOBULIN PLAS-MCNC: 3.7 G/DL (ref 2.8–4.4)
GLUCOSE BLD-MCNC: 97 MG/DL (ref 70–99)
HDLC SERPL-MCNC: 35 MG/DL (ref 40–59)
LDLC SERPL CALC-MCNC: 111 MG/DL (ref ?–100)
M PROTEIN MFR SERPL ELPH: 7.2 G/DL (ref 6.4–8.2)
NONHDLC SERPL-MCNC: 160 MG/DL (ref ?–130)
OSMOLALITY SERPL CALC.SUM OF ELEC: 289 MOSM/KG (ref 275–295)
PATIENT FASTING Y/N/NP: YES
PATIENT FASTING Y/N/NP: YES
POTASSIUM SERPL-SCNC: 4.4 MMOL/L (ref 3.5–5.1)
SODIUM SERPL-SCNC: 139 MMOL/L (ref 136–145)
T4 FREE SERPL-MCNC: 1.1 NG/DL (ref 0.8–1.7)
TRIGL SERPL-MCNC: 247 MG/DL (ref 30–149)
TSI SER-ACNC: 10.8 MIU/ML (ref 0.36–3.74)
VLDLC SERPL CALC-MCNC: 49 MG/DL (ref 0–30)

## 2020-03-09 PROCEDURE — 80053 COMPREHEN METABOLIC PANEL: CPT | Performed by: INTERNAL MEDICINE

## 2020-03-09 PROCEDURE — 84439 ASSAY OF FREE THYROXINE: CPT | Performed by: INTERNAL MEDICINE

## 2020-03-09 PROCEDURE — 84443 ASSAY THYROID STIM HORMONE: CPT | Performed by: INTERNAL MEDICINE

## 2020-03-09 PROCEDURE — 80061 LIPID PANEL: CPT | Performed by: INTERNAL MEDICINE

## 2020-03-09 PROCEDURE — 99214 OFFICE O/P EST MOD 30 MIN: CPT | Performed by: INTERNAL MEDICINE

## 2020-03-09 RX ORDER — HYDROCODONE BITARTRATE AND ACETAMINOPHEN 5; 325 MG/1; MG/1
1 TABLET ORAL EVERY 6 HOURS PRN
Qty: 40 TABLET | Refills: 0 | Status: SHIPPED | OUTPATIENT
Start: 2020-03-09

## 2020-03-09 RX ORDER — ALBUTEROL SULFATE 90 UG/1
2 AEROSOL, METERED RESPIRATORY (INHALATION) EVERY 4 HOURS PRN
Qty: 1 INHALER | Refills: 6 | Status: SHIPPED | OUTPATIENT
Start: 2020-03-09

## 2020-03-09 RX ORDER — DOXYCYCLINE HYCLATE 100 MG/1
100 CAPSULE ORAL 2 TIMES DAILY
Qty: 20 CAPSULE | Refills: 0 | Status: SHIPPED | OUTPATIENT
Start: 2020-03-09

## 2020-03-09 RX ORDER — PROMETHAZINE HYDROCHLORIDE AND CODEINE PHOSPHATE 6.25; 1 MG/5ML; MG/5ML
2.5 SYRUP ORAL EVERY 4 HOURS PRN
Qty: 180 ML | Refills: 0 | Status: SHIPPED | OUTPATIENT
Start: 2020-03-09 | End: 2022-01-07 | Stop reason: ALTCHOICE

## 2020-03-09 NOTE — PROGRESS NOTES
HPI:    Patient ID: Kathrine Wilde is a 71year old female. HPI pt here for evaluation of chest congestion and malaise for about a week. Has not had influenza vaccine. Smoker. Had a fever the first day of symptoms.  PCN    Review of Systems   Constitut Diclofenac Sodium (VOLTAREN) 1 % Transdermal Gel Apply 2 g topically 4 (four) times daily. 5 Tube 11   • Diclofenac Sodium 50 MG Oral Tab EC Take 1 tablet (50 mg total) by mouth 2 (two) times daily.  60 tablet 5   • Pantoprazole Sodium 40 MG Oral Tab EC John encounter       Imaging & Referrals:  None       OJ#[id number]

## 2020-05-20 RX ORDER — ERGOCALCIFEROL 1.25 MG/1
CAPSULE ORAL
Qty: 30 CAPSULE | Refills: 0 | Status: SHIPPED | OUTPATIENT
Start: 2020-05-20 | End: 2020-07-10

## 2020-05-20 RX ORDER — ALBUTEROL SULFATE 90 UG/1
AEROSOL, METERED RESPIRATORY (INHALATION)
Qty: 18 G | Refills: 0 | Status: SHIPPED | OUTPATIENT
Start: 2020-05-20 | End: 2020-06-16

## 2020-05-20 NOTE — TELEPHONE ENCOUNTER
Requested Prescriptions     Pending Prescriptions Disp Refills   • ERGOCALCIFEROL 1.25 MG (10736 UT) Oral Cap [Pharmacy Med Name: VITAMIN D2 50,000IU (ERGO) CAP RX] 30 capsule 0     Sig: TAKE ONE CAPSULE BY MOUTH ONCE A WEEK   • ALBUTEROL SULFATE  (

## 2020-06-16 RX ORDER — ALBUTEROL SULFATE 90 UG/1
AEROSOL, METERED RESPIRATORY (INHALATION)
Qty: 18 G | Refills: 0 | Status: SHIPPED | OUTPATIENT
Start: 2020-06-16 | End: 2020-09-10

## 2020-07-07 ENCOUNTER — TELEPHONE (OUTPATIENT)
Dept: INTERNAL MEDICINE CLINIC | Facility: CLINIC | Age: 70
End: 2020-07-07

## 2020-07-07 NOTE — TELEPHONE ENCOUNTER
Pt's daughter called and states her mom is having some swelling in her legs and feet. Currently Pt is in [facility] and will return on Thursday the 9th.  Dr Mccoy Beat next available office appt was July 27th, and Pt was scheduled there but would like to be seen

## 2020-07-10 ENCOUNTER — OFFICE VISIT (OUTPATIENT)
Dept: INTERNAL MEDICINE CLINIC | Facility: CLINIC | Age: 70
End: 2020-07-10
Payer: MEDICAID

## 2020-07-10 VITALS
OXYGEN SATURATION: 98 % | SYSTOLIC BLOOD PRESSURE: 124 MMHG | HEART RATE: 74 BPM | DIASTOLIC BLOOD PRESSURE: 76 MMHG | WEIGHT: 150.38 LBS | BODY MASS INDEX: 28.03 KG/M2 | HEIGHT: 61.5 IN | TEMPERATURE: 97 F

## 2020-07-10 DIAGNOSIS — E03.9 ACQUIRED HYPOTHYROIDISM: ICD-10-CM

## 2020-07-10 DIAGNOSIS — R60.0 LOCALIZED EDEMA: Primary | ICD-10-CM

## 2020-07-10 LAB
ANION GAP SERPL CALC-SCNC: 8 MMOL/L (ref 0–18)
BUN BLD-MCNC: 16 MG/DL (ref 7–18)
BUN/CREAT SERPL: 18.8 (ref 10–20)
CALCIUM BLD-MCNC: 9.2 MG/DL (ref 8.5–10.1)
CHLORIDE SERPL-SCNC: 106 MMOL/L (ref 98–112)
CO2 SERPL-SCNC: 28 MMOL/L (ref 21–32)
CREAT BLD-MCNC: 0.85 MG/DL (ref 0.55–1.02)
GLUCOSE BLD-MCNC: 96 MG/DL (ref 70–99)
OSMOLALITY SERPL CALC.SUM OF ELEC: 295 MOSM/KG (ref 275–295)
PATIENT FASTING Y/N/NP: NO
POTASSIUM SERPL-SCNC: 4.5 MMOL/L (ref 3.5–5.1)
SODIUM SERPL-SCNC: 142 MMOL/L (ref 136–145)
TSI SER-ACNC: 2.02 MIU/ML (ref 0.36–3.74)

## 2020-07-10 PROCEDURE — 99214 OFFICE O/P EST MOD 30 MIN: CPT | Performed by: INTERNAL MEDICINE

## 2020-07-10 PROCEDURE — 80048 BASIC METABOLIC PNL TOTAL CA: CPT | Performed by: INTERNAL MEDICINE

## 2020-07-10 PROCEDURE — 84443 ASSAY THYROID STIM HORMONE: CPT | Performed by: INTERNAL MEDICINE

## 2020-07-10 RX ORDER — ERGOCALCIFEROL 1.25 MG/1
50000 CAPSULE ORAL WEEKLY
Qty: 12 CAPSULE | Refills: 3 | Status: SHIPPED | OUTPATIENT
Start: 2020-07-10 | End: 2021-05-03

## 2020-07-10 RX ORDER — FUROSEMIDE 40 MG/1
40 TABLET ORAL DAILY
Qty: 30 TABLET | Refills: 1 | Status: SHIPPED | OUTPATIENT
Start: 2020-07-10

## 2020-07-10 NOTE — PROGRESS NOTES
HPI:    Patient ID: Charity Borja is a 71year old female. HPI Patient here for evaluation of bilateral ankle edema for the last month or so. [place] Has recently gained 15 lbs of weight.     Review of Systems once daily. 30 tablet 11   • ibuprofen 600 MG Oral Tab Take 1 tablet (600 mg total) by mouth every 6 (six) hours as needed for Pain.  60 tablet 1   • fluconazole 150 MG Oral Tab   0   • Cyclobenzaprine HCl 10 MG Oral Tab Take 1 tablet (10 mg total) by mouth oriented to person, place, and time. Skin: No rash noted. Psychiatric: She has a normal mood and affect.               ASSESSMENT/PLAN:   Localized edema  (primary encounter diagnosis) probably related to humid conditions and recent weight gain - Lasix

## 2020-09-10 RX ORDER — ALBUTEROL SULFATE 90 UG/1
2 AEROSOL, METERED RESPIRATORY (INHALATION) EVERY 4 HOURS PRN
Qty: 18 G | Refills: 0 | Status: SHIPPED | OUTPATIENT
Start: 2020-09-10 | End: 2020-10-20

## 2020-09-10 NOTE — TELEPHONE ENCOUNTER
Requested Prescriptions     Pending Prescriptions Disp Refills   • Albuterol Sulfate  (90 Base) MCG/ACT Inhalation Aero Soln 18 g 0     Sig: Inhale 2 puffs into the lungs every 4 (four) hours as needed for Wheezing.      Last office visit: 7-

## 2020-10-20 RX ORDER — ALBUTEROL SULFATE 90 UG/1
2 AEROSOL, METERED RESPIRATORY (INHALATION) EVERY 4 HOURS PRN
Qty: 18 G | Refills: 0 | Status: SHIPPED | OUTPATIENT
Start: 2020-10-20 | End: 2020-11-18

## 2020-10-22 ENCOUNTER — TELEPHONE (OUTPATIENT)
Dept: INTERNAL MEDICINE CLINIC | Facility: CLINIC | Age: 70
End: 2020-10-22

## 2020-10-22 NOTE — TELEPHONE ENCOUNTER
Patient's daughter called as her mother has a yeast infection. Asking for a refill on Fungoid Tincture medication. I explained that Dr. Diego Sanchez in not in on Thursday. She'll see this message tomorrow.

## 2020-10-23 RX ORDER — MICONAZOLE NITRATE 20 MG/ML
TINCTURE TOPICAL
Qty: 1 BOTTLE | Refills: 3 | Status: SHIPPED | OUTPATIENT
Start: 2020-10-23 | End: 2022-01-07 | Stop reason: ALTCHOICE

## 2020-10-27 NOTE — TELEPHONE ENCOUNTER
Patient's daughter called and she said the medication isn't at the pharmacy, as she double checked and no medication there at the [facility] in [facility].

## 2020-10-27 NOTE — TELEPHONE ENCOUNTER
Dr. Diego Sanchez is not registering with [facility] again. Work around is NOT being accepted. Will notifiy management.     Pharmacy will ask patient if she is willing to pay $19 for the miconazole nitrate given it is also sold as an OTC and won't need to go t

## 2020-10-27 NOTE — TELEPHONE ENCOUNTER
Spoke with daughter. Informed her that there continues to be an issue with insurance and that our management is working on it with [employer]. In the meantime, she can purchase both medications using [employer] for under $20 each.   \"That's why she has insura

## 2020-10-28 RX ORDER — FLUCONAZOLE 150 MG/1
150 TABLET ORAL ONCE
Qty: 1 TABLET | Refills: 0 | Status: SHIPPED | OUTPATIENT
Start: 2020-10-28 | End: 2020-10-28

## 2020-11-18 RX ORDER — ALBUTEROL SULFATE 90 UG/1
2 AEROSOL, METERED RESPIRATORY (INHALATION) EVERY 4 HOURS PRN
Qty: 18 G | Refills: 0 | Status: SHIPPED | OUTPATIENT
Start: 2020-11-18 | End: 2021-01-19

## 2020-12-18 RX ORDER — SIMVASTATIN 10 MG
TABLET ORAL
Qty: 30 TABLET | Refills: 11 | Status: SHIPPED | OUTPATIENT
Start: 2020-12-18 | End: 2022-01-07

## 2020-12-18 RX ORDER — PROPRANOLOL HYDROCHLORIDE 20 MG/1
TABLET ORAL
Qty: 60 TABLET | Refills: 11 | Status: SHIPPED | OUTPATIENT
Start: 2020-12-18 | End: 2022-01-07

## 2020-12-21 RX ORDER — FLUTICASONE PROPIONATE 50 MCG
SPRAY, SUSPENSION (ML) NASAL
Qty: 16 G | Refills: 0 | Status: SHIPPED | OUTPATIENT
Start: 2020-12-21 | End: 2021-03-22

## 2021-01-19 RX ORDER — ALBUTEROL SULFATE 90 UG/1
AEROSOL, METERED RESPIRATORY (INHALATION)
Qty: 18 G | Refills: 0 | Status: SHIPPED | OUTPATIENT
Start: 2021-01-19 | End: 2022-01-07

## 2021-01-19 NOTE — TELEPHONE ENCOUNTER
Requested Prescriptions     Pending Prescriptions Disp Refills   • ALBUTEROL SULFATE  (90 Base) MCG/ACT Inhalation Aero Soln [Pharmacy Med Name: ALBUTEROL HFA INH(200 PUFFS)18GM] 18 g 0     Sig: INHALE 2 PUFFS BY MOUTH EVERY 4 HOURS AS NEEDED FOR [facility]

## 2021-02-25 RX ORDER — ALENDRONATE SODIUM 70 MG/1
TABLET ORAL
Qty: 12 TABLET | Refills: 3 | Status: SHIPPED | OUTPATIENT
Start: 2021-02-25 | End: 2022-01-07

## 2021-03-05 ENCOUNTER — HOSPITAL ENCOUNTER (OUTPATIENT)
Dept: GENERAL RADIOLOGY | Age: 71
Discharge: HOME | End: 2021-03-05
Attending: INTERNAL MEDICINE
Payer: MEDICARE

## 2021-03-05 ENCOUNTER — OFFICE VISIT (OUTPATIENT)
Dept: INTERNAL MEDICINE CLINIC | Facility: CLINIC | Age: 71
End: 2021-03-05
Payer: MEDICAID

## 2021-03-05 VITALS
WEIGHT: 151 LBS | DIASTOLIC BLOOD PRESSURE: 60 MMHG | HEART RATE: 70 BPM | OXYGEN SATURATION: 97 % | SYSTOLIC BLOOD PRESSURE: 130 MMHG | BODY MASS INDEX: 28.14 KG/M2 | HEIGHT: 61.5 IN

## 2021-03-05 DIAGNOSIS — M25.551 HIP PAIN, BILATERAL: ICD-10-CM

## 2021-03-05 DIAGNOSIS — E78.2 MIXED HYPERLIPIDEMIA: ICD-10-CM

## 2021-03-05 DIAGNOSIS — M25.552 HIP PAIN, BILATERAL: ICD-10-CM

## 2021-03-05 DIAGNOSIS — M25.551 HIP PAIN, BILATERAL: Primary | ICD-10-CM

## 2021-03-05 DIAGNOSIS — M25.552 HIP PAIN, BILATERAL: Primary | ICD-10-CM

## 2021-03-05 DIAGNOSIS — E03.9 ACQUIRED HYPOTHYROIDISM: ICD-10-CM

## 2021-03-05 DIAGNOSIS — Z23 NEED FOR VACCINATION: ICD-10-CM

## 2021-03-05 LAB
ALBUMIN SERPL-MCNC: 3.9 G/DL (ref 3.4–5)
ALBUMIN/GLOB SERPL: 1.3 {RATIO} (ref 1–2)
ALP LIVER SERPL-CCNC: 84 U/L
ALT SERPL-CCNC: 17 U/L
ANION GAP SERPL CALC-SCNC: 4 MMOL/L (ref 0–18)
AST SERPL-CCNC: 12 U/L (ref 15–37)
BASOPHILS # BLD AUTO: 0.05 X10(3) UL (ref 0–0.2)
BASOPHILS NFR BLD AUTO: 0.8 %
BILIRUB SERPL-MCNC: 0.4 MG/DL (ref 0.1–2)
BUN BLD-MCNC: 13 MG/DL (ref 7–18)
BUN/CREAT SERPL: 17.1 (ref 10–20)
CALCIUM BLD-MCNC: 9.3 MG/DL (ref 8.5–10.1)
CHLORIDE SERPL-SCNC: 107 MMOL/L (ref 98–112)
CHOLEST SMN-MCNC: 233 MG/DL (ref ?–200)
CO2 SERPL-SCNC: 30 MMOL/L (ref 21–32)
CREAT BLD-MCNC: 0.76 MG/DL
DEPRECATED RDW RBC AUTO: 45.4 FL (ref 35.1–46.3)
EOSINOPHIL # BLD AUTO: 0.05 X10(3) UL (ref 0–0.7)
EOSINOPHIL NFR BLD AUTO: 0.8 %
ERYTHROCYTE [DISTWIDTH] IN BLOOD BY AUTOMATED COUNT: 12.7 % (ref 11–15)
GLOBULIN PLAS-MCNC: 3 G/DL (ref 2.8–4.4)
GLUCOSE BLD-MCNC: 102 MG/DL (ref 70–99)
HCT VFR BLD AUTO: 47.2 %
HDLC SERPL-MCNC: 38 MG/DL (ref 40–59)
HGB BLD-MCNC: 15.1 G/DL
IMM GRANULOCYTES # BLD AUTO: 0.01 X10(3) UL (ref 0–1)
IMM GRANULOCYTES NFR BLD: 0.2 %
LDLC SERPL CALC-MCNC: 151 MG/DL (ref ?–100)
LYMPHOCYTES # BLD AUTO: 1.24 X10(3) UL (ref 1–4)
LYMPHOCYTES NFR BLD AUTO: 20.4 %
M PROTEIN MFR SERPL ELPH: 6.9 G/DL (ref 6.4–8.2)
MCH RBC QN AUTO: 30.9 PG (ref 26–34)
MCHC RBC AUTO-ENTMCNC: 32 G/DL (ref 31–37)
MCV RBC AUTO: 96.7 FL
MONOCYTES # BLD AUTO: 0.49 X10(3) UL (ref 0.1–1)
MONOCYTES NFR BLD AUTO: 8 %
NEUTROPHILS # BLD AUTO: 4.25 X10 (3) UL (ref 1.5–7.7)
NEUTROPHILS # BLD AUTO: 4.25 X10(3) UL (ref 1.5–7.7)
NEUTROPHILS NFR BLD AUTO: 69.8 %
NONHDLC SERPL-MCNC: 195 MG/DL (ref ?–130)
OSMOLALITY SERPL CALC.SUM OF ELEC: 292 MOSM/KG (ref 275–295)
PATIENT FASTING Y/N/NP: YES
PATIENT FASTING Y/N/NP: YES
PLATELET # BLD AUTO: 262 10(3)UL (ref 150–450)
POTASSIUM SERPL-SCNC: 4.5 MMOL/L (ref 3.5–5.1)
RBC # BLD AUTO: 4.88 X10(6)UL
SODIUM SERPL-SCNC: 141 MMOL/L (ref 136–145)
T4 FREE SERPL-MCNC: 1.3 NG/DL (ref 0.8–1.7)
TRIGL SERPL-MCNC: 219 MG/DL (ref 30–149)
TSI SER-ACNC: 2.86 MIU/ML (ref 0.36–3.74)
VLDLC SERPL CALC-MCNC: 44 MG/DL (ref 0–30)
WBC # BLD AUTO: 6.1 X10(3) UL (ref 4–11)

## 2021-03-05 PROCEDURE — 3075F SYST BP GE 130 - 139MM HG: CPT | Performed by: INTERNAL MEDICINE

## 2021-03-05 PROCEDURE — 80061 LIPID PANEL: CPT | Performed by: INTERNAL MEDICINE

## 2021-03-05 PROCEDURE — 73502 X-RAY EXAM HIP UNI 2-3 VIEWS: CPT | Performed by: INTERNAL MEDICINE

## 2021-03-05 PROCEDURE — 3078F DIAST BP <80 MM HG: CPT | Performed by: INTERNAL MEDICINE

## 2021-03-05 PROCEDURE — 84439 ASSAY OF FREE THYROXINE: CPT | Performed by: INTERNAL MEDICINE

## 2021-03-05 PROCEDURE — 85025 COMPLETE CBC W/AUTO DIFF WBC: CPT | Performed by: INTERNAL MEDICINE

## 2021-03-05 PROCEDURE — 99214 OFFICE O/P EST MOD 30 MIN: CPT | Performed by: INTERNAL MEDICINE

## 2021-03-05 PROCEDURE — 3008F BODY MASS INDEX DOCD: CPT | Performed by: INTERNAL MEDICINE

## 2021-03-05 PROCEDURE — 80053 COMPREHEN METABOLIC PANEL: CPT | Performed by: INTERNAL MEDICINE

## 2021-03-05 PROCEDURE — 84443 ASSAY THYROID STIM HORMONE: CPT | Performed by: INTERNAL MEDICINE

## 2021-03-05 NOTE — PROGRESS NOTES
HPI:    Patient ID: Lisa Alas is a 79year old female. HPIPt here for evaluation of bilateral hip pain and generalized arthritic complaints. Has not had imaging of hips. Review of Systems   Constitutional: Positive for unexpected weight change. Inhaler 6   • HYDROcodone-acetaminophen (NORCO) 5-325 MG Oral Tab Take 1 tablet by mouth every 6 (six) hours as needed for Pain. 40 tablet 0   • promethazine-codeine 6.25-10 MG/5ML Oral Syrup Take 2.5 mL by mouth every 4 (four) hours as needed for cough.  1 heart sounds. Pulmonary/Chest: She has no wheezes. She has no rales. Abdominal: There is no abdominal tenderness. Musculoskeletal:         General: Tenderness (painful rom in both hips) present. Lymphadenopathy:     She has no cervical adenopathy.

## 2021-03-22 RX ORDER — LEVOTHYROXINE SODIUM 88 UG/1
TABLET ORAL
Qty: 30 TABLET | Refills: 11 | Status: SHIPPED | OUTPATIENT
Start: 2021-03-22 | End: 2022-01-07

## 2021-03-22 RX ORDER — FLUTICASONE PROPIONATE 50 MCG
SPRAY, SUSPENSION (ML) NASAL
Qty: 16 G | Refills: 0 | Status: SHIPPED | OUTPATIENT
Start: 2021-03-22 | End: 2021-04-20

## 2021-03-23 RX ORDER — LORAZEPAM 0.5 MG/1
0.5 TABLET ORAL EVERY 6 HOURS PRN
Qty: 30 TABLET | Refills: 3 | Status: SHIPPED | OUTPATIENT
Start: 2021-03-23

## 2021-04-20 RX ORDER — FLUTICASONE PROPIONATE 50 MCG
SPRAY, SUSPENSION (ML) NASAL
Qty: 16 G | Refills: 0 | Status: SHIPPED | OUTPATIENT
Start: 2021-04-20 | End: 2021-05-19

## 2021-04-20 RX ORDER — FLUCONAZOLE 150 MG/1
TABLET ORAL
Qty: 1 TABLET | Refills: 0 | Status: SHIPPED | OUTPATIENT
Start: 2021-04-20 | End: 2022-01-07

## 2021-04-20 RX ORDER — ASPIRIN 81 MG/1
TABLET, COATED ORAL
Qty: 30 TABLET | Refills: 11 | Status: SHIPPED | OUTPATIENT
Start: 2021-04-20 | End: 2021-11-04

## 2021-04-20 NOTE — TELEPHONE ENCOUNTER
Requested Prescriptions     Pending Prescriptions Disp Refills   • ASPIRIN LOW DOSE 81 MG Oral Tab EC [Pharmacy Med Name: ASPIRIN 81MG EC LOW DOSE TABLETS] 30 tablet 11     Sig: TAKE 1 TABLET BY MOUTH DAILY   • FLUTICASONE PROPIONATE 50 MCG/ACT Nasal Suspe

## 2021-04-24 RX ORDER — ALBUTEROL SULFATE 90 UG/1
AEROSOL, METERED RESPIRATORY (INHALATION)
Qty: 18 G | Refills: 0 | Status: SHIPPED | OUTPATIENT
Start: 2021-04-24 | End: 2021-05-17

## 2021-04-24 NOTE — TELEPHONE ENCOUNTER
Requested Prescriptions     Pending Prescriptions Disp Refills   • ALBUTEROL SULFATE  (90 Base) MCG/ACT Inhalation Aero Soln [Pharmacy Med Name: ALBUTEROL HFA INH(200 PUFFS)18GM] 18 g 0     Sig: INHALE 2 PUFFS INTO THE LUNGS EVERY 4 HOURS AS NEEDED

## 2021-05-03 RX ORDER — ERGOCALCIFEROL 1.25 MG/1
CAPSULE ORAL
Qty: 30 CAPSULE | Refills: 0 | Status: SHIPPED | OUTPATIENT
Start: 2021-05-03 | End: 2022-01-25

## 2021-05-03 NOTE — TELEPHONE ENCOUNTER
Requested Prescriptions     Pending Prescriptions Disp Refills   • ERGOCALCIFEROL 1.25 MG (03535 UT) Oral Cap [Pharmacy Med Name: VITAMIN D2 50,000IU (ERGO) CAP RX] 30 capsule 0     Sig: TAKE ONE CAPSULE BY MOUTH ONCE WEEKLY     Last office visit: 3-5-21

## 2021-05-17 RX ORDER — ALBUTEROL SULFATE 90 UG/1
AEROSOL, METERED RESPIRATORY (INHALATION)
Qty: 18 G | Refills: 0 | Status: SHIPPED | OUTPATIENT
Start: 2021-05-17 | End: 2021-06-25

## 2021-05-19 RX ORDER — FLUTICASONE PROPIONATE 50 MCG
SPRAY, SUSPENSION (ML) NASAL
Qty: 16 G | Refills: 0 | Status: SHIPPED | OUTPATIENT
Start: 2021-05-19

## 2021-06-25 ENCOUNTER — OFFICE VISIT (OUTPATIENT)
Dept: INTERNAL MEDICINE CLINIC | Facility: CLINIC | Age: 71
End: 2021-06-25
Payer: MEDICAID

## 2021-06-25 VITALS
HEART RATE: 72 BPM | WEIGHT: 151 LBS | SYSTOLIC BLOOD PRESSURE: 140 MMHG | HEIGHT: 61.5 IN | BODY MASS INDEX: 28.14 KG/M2 | DIASTOLIC BLOOD PRESSURE: 82 MMHG | OXYGEN SATURATION: 99 %

## 2021-06-25 DIAGNOSIS — J41.1 MUCOPURULENT CHRONIC BRONCHITIS (HCC): Primary | ICD-10-CM

## 2021-06-25 PROCEDURE — 3008F BODY MASS INDEX DOCD: CPT | Performed by: INTERNAL MEDICINE

## 2021-06-25 PROCEDURE — 99213 OFFICE O/P EST LOW 20 MIN: CPT | Performed by: INTERNAL MEDICINE

## 2021-06-25 PROCEDURE — 3079F DIAST BP 80-89 MM HG: CPT | Performed by: INTERNAL MEDICINE

## 2021-06-25 PROCEDURE — 3077F SYST BP >= 140 MM HG: CPT | Performed by: INTERNAL MEDICINE

## 2021-06-25 RX ORDER — AZITHROMYCIN 250 MG/1
TABLET, FILM COATED ORAL
Qty: 6 TABLET | Refills: 0 | Status: SHIPPED | OUTPATIENT
Start: 2021-06-25 | End: 2021-06-30

## 2021-06-25 RX ORDER — ALBUTEROL SULFATE 90 UG/1
2 AEROSOL, METERED RESPIRATORY (INHALATION) EVERY 4 HOURS PRN
Qty: 18 G | Refills: 11 | Status: SHIPPED | OUTPATIENT
Start: 2021-06-25 | End: 2022-01-07 | Stop reason: ALTCHOICE

## 2021-06-25 NOTE — PROGRESS NOTES
HPI/Subjective:   Patient ID: Antoine Zamoar is a 79year old female. HPI Patient here for evaluation of post nasal drip and chest congestion since returning from [facility] to [place] . No fever or chills or myalgias. Is smoker .   Has cough producti Cream Topically bid 1 Tube 3   • furosemide 40 MG Oral Tab Take 1 tablet (40 mg total) by mouth daily. 30 tablet 1   • Doxycycline Hyclate 100 MG Oral Cap Take 1 capsule (100 mg total) by mouth 2 (two) times daily.  20 capsule 0   • Albuterol Sulfate HFA (V appearance. HENT:      Head: Normocephalic. Right Ear: Ear canal normal.      Left Ear: Ear canal normal.      Mouth/Throat:      Comments: Post nasal drip    Eyes:      General: No scleral icterus.      Pupils: Pupils are equal, round, and reactive

## 2021-08-11 RX ORDER — ALBUTEROL SULFATE 90 UG/1
AEROSOL, METERED RESPIRATORY (INHALATION)
Qty: 18 G | Refills: 0 | Status: SHIPPED | OUTPATIENT
Start: 2021-08-11 | End: 2022-01-07 | Stop reason: ALTCHOICE

## 2021-11-04 RX ORDER — ASPIRIN 81 MG/1
81 TABLET ORAL DAILY
Qty: 30 TABLET | Refills: 11 | Status: SHIPPED | OUTPATIENT
Start: 2021-11-04

## 2021-11-04 NOTE — TELEPHONE ENCOUNTER
A refill request was received for:  Requested Prescriptions     Pending Prescriptions Disp Refills   • aspirin (ASPIRIN LOW DOSE) 81 MG Oral Tab EC 30 tablet 11     Sig: Take 1 tablet (81 mg total) by mouth daily.      Last refill date: 4/20/21  Qty: 30 wit

## 2021-12-16 ENCOUNTER — TELEPHONE (OUTPATIENT)
Dept: INTERNAL MEDICINE CLINIC | Facility: CLINIC | Age: 71
End: 2021-12-16

## 2021-12-16 NOTE — TELEPHONE ENCOUNTER
Patient's daughter called, as she thinks she has bronchitis. I explained no openings to see Dr. Temi Loco, and she could be seen at the [facility]. Explained she then could be checked for Covid along with bronchitis and other cold related symptoms.

## 2021-12-17 RX ORDER — AZITHROMYCIN 250 MG/1
TABLET, FILM COATED ORAL
Qty: 6 TABLET | Refills: 0 | OUTPATIENT
Start: 2021-12-17

## 2021-12-20 RX ORDER — AZITHROMYCIN 250 MG/1
TABLET, FILM COATED ORAL
Qty: 6 TABLET | Refills: 0 | OUTPATIENT
Start: 2021-12-20

## 2021-12-20 NOTE — TELEPHONE ENCOUNTER
Patient's daughter called, [place] called as the [facility]Pack medication isn't at the [facility] in [place].     Also, she said her mother was diagnosed with Covid and is also asking for a return call from Dr. Diego Sanchez, to discuss her symptoms and care for her moth

## 2022-01-07 ENCOUNTER — OFFICE VISIT (OUTPATIENT)
Dept: INTERNAL MEDICINE CLINIC | Facility: CLINIC | Age: 72
End: 2022-01-07
Payer: MEDICAID

## 2022-01-07 VITALS
HEIGHT: 61.5 IN | BODY MASS INDEX: 26.65 KG/M2 | WEIGHT: 143 LBS | OXYGEN SATURATION: 97 % | DIASTOLIC BLOOD PRESSURE: 82 MMHG | SYSTOLIC BLOOD PRESSURE: 130 MMHG | HEART RATE: 75 BPM

## 2022-01-07 DIAGNOSIS — Z86.16 HISTORY OF COVID-19: ICD-10-CM

## 2022-01-07 DIAGNOSIS — E78.2 MIXED HYPERLIPIDEMIA: ICD-10-CM

## 2022-01-07 DIAGNOSIS — Z12.31 SCREENING MAMMOGRAM, ENCOUNTER FOR: ICD-10-CM

## 2022-01-07 DIAGNOSIS — E03.9 ACQUIRED HYPOTHYROIDISM: ICD-10-CM

## 2022-01-07 DIAGNOSIS — Z00.00 WELLNESS EXAMINATION: Primary | ICD-10-CM

## 2022-01-07 DIAGNOSIS — J43.1 PANLOBULAR EMPHYSEMA (HCC): ICD-10-CM

## 2022-01-07 PROCEDURE — 99397 PER PM REEVAL EST PAT 65+ YR: CPT | Performed by: INTERNAL MEDICINE

## 2022-01-07 PROCEDURE — 3008F BODY MASS INDEX DOCD: CPT | Performed by: INTERNAL MEDICINE

## 2022-01-07 PROCEDURE — 3075F SYST BP GE 130 - 139MM HG: CPT | Performed by: INTERNAL MEDICINE

## 2022-01-07 PROCEDURE — 3079F DIAST BP 80-89 MM HG: CPT | Performed by: INTERNAL MEDICINE

## 2022-01-07 RX ORDER — FLUCONAZOLE 150 MG/1
150 TABLET ORAL ONCE
Qty: 1 TABLET | Refills: 2 | Status: SHIPPED | OUTPATIENT
Start: 2022-01-07 | End: 2022-01-07

## 2022-01-07 RX ORDER — ALENDRONATE SODIUM 70 MG/1
TABLET ORAL
Qty: 12 TABLET | Refills: 3 | Status: SHIPPED | OUTPATIENT
Start: 2022-01-07

## 2022-01-07 RX ORDER — LEVOTHYROXINE SODIUM 88 UG/1
88 TABLET ORAL
Qty: 30 TABLET | Refills: 11 | Status: SHIPPED | OUTPATIENT
Start: 2022-01-07

## 2022-01-07 RX ORDER — ALBUTEROL SULFATE 90 UG/1
2 AEROSOL, METERED RESPIRATORY (INHALATION) EVERY 4 HOURS PRN
Qty: 18 G | Refills: 3 | Status: SHIPPED | OUTPATIENT
Start: 2022-01-07

## 2022-01-07 RX ORDER — SIMVASTATIN 10 MG
10 TABLET ORAL NIGHTLY
Qty: 30 TABLET | Refills: 11 | Status: SHIPPED | OUTPATIENT
Start: 2022-01-07

## 2022-01-07 RX ORDER — PROPRANOLOL HYDROCHLORIDE 20 MG/1
20 TABLET ORAL 2 TIMES DAILY
Qty: 60 TABLET | Refills: 11 | Status: SHIPPED | OUTPATIENT
Start: 2022-01-07

## 2022-01-07 NOTE — PROGRESS NOTES
Subjective:   Patient ID: Breezy Yepez is a 70year old female. HPIPatient here for a wellness visit and fu on COPD (Smoker)hypothyroidism osteoprosis and recnet hx of COvid infection.     History/Other:   Review of Systems   Constitutional: Positive f tablet 1   • Doxycycline Hyclate 100 MG Oral Cap Take 1 capsule (100 mg total) by mouth 2 (two) times daily.  20 capsule 0   • Albuterol Sulfate HFA (VENTOLIN HFA) 108 (90 Base) MCG/ACT Inhalation Aero Soln Inhale 2 puffs into the lungs every 4 (four) hours Pulmonary:      Effort: Pulmonary effort is normal.      Breath sounds: Normal breath sounds. Abdominal:      Palpations: Abdomen is soft. Tenderness: There is no abdominal tenderness.    Genitourinary:     Comments: Breasts no masses  Musculoskele

## 2022-01-08 LAB
ALBUMIN/GLOBULIN RATIO: 1.6 (CALC) (ref 1–2.5)
ALBUMIN: 4.1 G/DL (ref 3.6–5.1)
ALKALINE PHOSPHATASE: 76 U/L (ref 37–153)
ALT: 11 U/L (ref 6–29)
AST: 15 U/L (ref 10–35)
BILIRUBIN, TOTAL: 0.4 MG/DL (ref 0.2–1.2)
BUN: 9 MG/DL (ref 7–25)
CALCIUM: 9.4 MG/DL (ref 8.6–10.4)
CARBON DIOXIDE: 25 MMOL/L (ref 20–32)
CHLORIDE: 106 MMOL/L (ref 98–110)
CHOL/HDLC RATIO: 5.7 (CALC)
CHOLESTEROL, TOTAL: 246 MG/DL
CREATININE: 0.66 MG/DL (ref 0.6–0.93)
EGFR IF AFRICN AM: 103 ML/MIN/1.73M2
EGFR IF NONAFRICN AM: 89 ML/MIN/1.73M2
GLOBULIN: 2.5 G/DL (CALC) (ref 1.9–3.7)
GLUCOSE: 106 MG/DL (ref 65–99)
HDL CHOLESTEROL: 43 MG/DL
LDL-CHOLESTEROL: 162 MG/DL (CALC)
NON-HDL CHOLESTEROL: 203 MG/DL (CALC)
POTASSIUM: 4.8 MMOL/L (ref 3.5–5.3)
PROTEIN, TOTAL: 6.6 G/DL (ref 6.1–8.1)
SODIUM: 139 MMOL/L (ref 135–146)
TRIGLYCERIDES: 251 MG/DL
TSH W/REFLEX TO FT4: 1.04 MIU/L (ref 0.4–4.5)

## 2022-01-25 RX ORDER — ERGOCALCIFEROL 1.25 MG/1
CAPSULE ORAL
Qty: 30 CAPSULE | Refills: 0 | Status: SHIPPED | OUTPATIENT
Start: 2022-01-25

## 2022-01-25 NOTE — TELEPHONE ENCOUNTER
A refill request was received for:  Requested Prescriptions     Pending Prescriptions Disp Refills   • ERGOCALCIFEROL 1.25 MG (35381 UT) Oral Cap [Pharmacy Med Name: VITAMIN D2 50,000IU (ERGO) CAP RX] 30 capsule 0     Sig: TAKE ONE CAPSULE BY MOUTH ONCE WE

## 2022-01-31 ENCOUNTER — TELEPHONE (OUTPATIENT)
Dept: INTERNAL MEDICINE CLINIC | Facility: CLINIC | Age: 72
End: 2022-01-31

## 2022-01-31 NOTE — TELEPHONE ENCOUNTER
Patient's daughter called. Asking for a referral to an ear doctor, as her mother continues to have pain in her ear.

## 2022-02-08 ENCOUNTER — OFFICE VISIT (OUTPATIENT)
Dept: OTOLARYNGOLOGY | Facility: CLINIC | Age: 72
End: 2022-02-08
Payer: MEDICAID

## 2022-02-08 VITALS — HEIGHT: 61.5 IN | WEIGHT: 143 LBS | BODY MASS INDEX: 26.65 KG/M2

## 2022-02-08 DIAGNOSIS — J34.2 NASAL SEPTAL DEVIATION: ICD-10-CM

## 2022-02-08 DIAGNOSIS — H93.13 BILATERAL TINNITUS: ICD-10-CM

## 2022-02-08 DIAGNOSIS — H61.23 CERUMEN DEBRIS ON TYMPANIC MEMBRANE OF BOTH EARS: Primary | ICD-10-CM

## 2022-02-08 PROCEDURE — 69210 REMOVE IMPACTED EAR WAX UNI: CPT | Performed by: SPECIALIST

## 2022-02-08 PROCEDURE — 99202 OFFICE O/P NEW SF 15 MIN: CPT | Performed by: SPECIALIST

## 2022-02-08 PROCEDURE — 3008F BODY MASS INDEX DOCD: CPT | Performed by: SPECIALIST

## 2022-02-08 NOTE — PATIENT INSTRUCTIONS
Your ears were fully cleaned. There is a slight amount of crusting in your nose and your nose was slightly deviated. Oral exam was significant only for a geographic tongue. No carotid bruits.

## 2022-05-09 RX ORDER — FLUTICASONE PROPIONATE 50 MCG
SPRAY, SUSPENSION (ML) NASAL
Qty: 16 G | Refills: 0 | Status: SHIPPED | OUTPATIENT
Start: 2022-05-09

## 2022-06-27 ENCOUNTER — TELEPHONE (OUTPATIENT)
Dept: INTERNAL MEDICINE CLINIC | Facility: CLINIC | Age: 72
End: 2022-06-27

## 2022-06-27 NOTE — TELEPHONE ENCOUNTER
Needs an order to see Dr. Sydni Sepulveda for ear infection which is very painful. No need for referral authorization as it is an auto approval.  Can [place] please call daughter when entered.

## 2022-06-27 NOTE — TELEPHONE ENCOUNTER
Patient's daughter called for her mother. Her mother has the same ear infection again with her ear. Asking if she can have a referral for the same doctor she saw in January this year. Dr. Ta León    Patient only had one office visit. Daughter would like a return call back when the referral is completed so she can schedule an appointment.

## 2022-06-28 ENCOUNTER — OFFICE VISIT (OUTPATIENT)
Dept: OTOLARYNGOLOGY | Facility: CLINIC | Age: 72
End: 2022-06-28
Payer: MEDICAID

## 2022-06-28 DIAGNOSIS — H92.01 ACUTE OTALGIA, RIGHT: ICD-10-CM

## 2022-06-28 DIAGNOSIS — E06.9 THYROIDITIS: Primary | ICD-10-CM

## 2022-06-28 DIAGNOSIS — E03.9 ACQUIRED HYPOTHYROIDISM: ICD-10-CM

## 2022-06-28 PROCEDURE — 99213 OFFICE O/P EST LOW 20 MIN: CPT | Performed by: SPECIALIST

## 2022-06-28 NOTE — PATIENT INSTRUCTIONS
Have right otalgia within normal otologic exam.    You should remove your dentures at bedtime. I ordered a thyroid ultrasound to be sure there are no thyroid nodules with referred right otalgia.

## 2022-06-28 NOTE — TELEPHONE ENCOUNTER
Per referral team:   - no Esvin Tai is required for office visits -ming just needs to make sure site will accept her plan.

## 2022-08-04 ENCOUNTER — HOSPITAL ENCOUNTER (OUTPATIENT)
Dept: ULTRASOUND IMAGING | Age: 72
Discharge: HOME | End: 2022-08-04
Attending: SPECIALIST
Payer: MEDICAID

## 2022-08-04 DIAGNOSIS — E06.9 THYROIDITIS: ICD-10-CM

## 2022-08-04 PROCEDURE — 76536 US EXAM OF HEAD AND NECK: CPT | Performed by: SPECIALIST

## 2022-08-09 ENCOUNTER — TELEPHONE (OUTPATIENT)
Dept: INTERNAL MEDICINE CLINIC | Facility: CLINIC | Age: 72
End: 2022-08-09

## 2022-08-09 NOTE — TELEPHONE ENCOUNTER
Patient's daughter called. Calling for the results of her mother's ultrasound. I explained to the daughter that Dr. Jayy Hernandez will be back in the office on Thursday. She said that is fine, and to call either her mother  Or herself.

## 2022-08-23 ENCOUNTER — TELEPHONE (OUTPATIENT)
Dept: INTERNAL MEDICINE CLINIC | Facility: CLINIC | Age: 72
End: 2022-08-23

## 2022-08-23 NOTE — TELEPHONE ENCOUNTER
Patient wants doctor Hugo to call her back with ultrasound results, even though they were order per Dr. Riya Reddy per patient she wants her pcp to give results, pt was upset she called since 08/09/2022 and no one call her back.

## 2022-10-13 RX ORDER — ERGOCALCIFEROL 1.25 MG/1
CAPSULE ORAL
Qty: 30 CAPSULE | Refills: 0 | Status: SHIPPED | OUTPATIENT
Start: 2022-10-13

## 2022-10-27 ENCOUNTER — OFFICE VISIT (OUTPATIENT)
Dept: INTERNAL MEDICINE CLINIC | Facility: CLINIC | Age: 72
End: 2022-10-27
Payer: MEDICAID

## 2022-10-27 VITALS
OXYGEN SATURATION: 95 % | DIASTOLIC BLOOD PRESSURE: 70 MMHG | HEART RATE: 71 BPM | BODY MASS INDEX: 25.53 KG/M2 | WEIGHT: 137 LBS | SYSTOLIC BLOOD PRESSURE: 120 MMHG | HEIGHT: 61.5 IN

## 2022-10-27 DIAGNOSIS — E03.9 ACQUIRED HYPOTHYROIDISM: ICD-10-CM

## 2022-10-27 DIAGNOSIS — R63.4 UNINTENDED WEIGHT LOSS: Primary | ICD-10-CM

## 2022-10-27 DIAGNOSIS — J43.1 PANLOBULAR EMPHYSEMA (HCC): ICD-10-CM

## 2022-10-27 DIAGNOSIS — M54.16 LUMBAR RADICULOPATHY: ICD-10-CM

## 2022-10-27 LAB
ALBUMIN SERPL-MCNC: 3.5 G/DL (ref 3.4–5)
ALBUMIN/GLOB SERPL: 1 {RATIO} (ref 1–2)
ALP LIVER SERPL-CCNC: 80 U/L
ALT SERPL-CCNC: 21 U/L
ANION GAP SERPL CALC-SCNC: 6 MMOL/L (ref 0–18)
AST SERPL-CCNC: 16 U/L (ref 15–37)
BASOPHILS # BLD AUTO: 0.09 X10(3) UL (ref 0–0.2)
BASOPHILS NFR BLD AUTO: 1.1 %
BILIRUB SERPL-MCNC: 0.4 MG/DL (ref 0.1–2)
BUN BLD-MCNC: 11 MG/DL (ref 7–18)
BUN/CREAT SERPL: 15.7 (ref 10–20)
CALCIUM BLD-MCNC: 9 MG/DL (ref 8.5–10.1)
CHLORIDE SERPL-SCNC: 108 MMOL/L (ref 98–112)
CHOLEST SERPL-MCNC: 174 MG/DL (ref ?–200)
CO2 SERPL-SCNC: 28 MMOL/L (ref 21–32)
CREAT BLD-MCNC: 0.7 MG/DL
DEPRECATED RDW RBC AUTO: 43.8 FL (ref 35.1–46.3)
EOSINOPHIL # BLD AUTO: 0.07 X10(3) UL (ref 0–0.7)
EOSINOPHIL NFR BLD AUTO: 0.8 %
ERYTHROCYTE [DISTWIDTH] IN BLOOD BY AUTOMATED COUNT: 12.6 % (ref 11–15)
FASTING PATIENT LIPID ANSWER: YES
FASTING STATUS PATIENT QL REPORTED: YES
GFR SERPLBLD BASED ON 1.73 SQ M-ARVRAT: 92 ML/MIN/1.73M2 (ref 60–?)
GLOBULIN PLAS-MCNC: 3.6 G/DL (ref 2.8–4.4)
GLUCOSE BLD-MCNC: 94 MG/DL (ref 70–99)
HCT VFR BLD AUTO: 45.7 %
HDLC SERPL-MCNC: 40 MG/DL (ref 40–59)
HGB BLD-MCNC: 15.1 G/DL
IMM GRANULOCYTES # BLD AUTO: 0.03 X10(3) UL (ref 0–1)
IMM GRANULOCYTES NFR BLD: 0.4 %
LDLC SERPL CALC-MCNC: 98 MG/DL (ref ?–100)
LYMPHOCYTES # BLD AUTO: 1.95 X10(3) UL (ref 1–4)
LYMPHOCYTES NFR BLD AUTO: 23.5 %
MCH RBC QN AUTO: 31.3 PG (ref 26–34)
MCHC RBC AUTO-ENTMCNC: 33 G/DL (ref 31–37)
MCV RBC AUTO: 94.8 FL
MONOCYTES # BLD AUTO: 0.57 X10(3) UL (ref 0.1–1)
MONOCYTES NFR BLD AUTO: 6.9 %
NEUTROPHILS # BLD AUTO: 5.59 X10 (3) UL (ref 1.5–7.7)
NEUTROPHILS # BLD AUTO: 5.59 X10(3) UL (ref 1.5–7.7)
NEUTROPHILS NFR BLD AUTO: 67.3 %
NONHDLC SERPL-MCNC: 134 MG/DL (ref ?–130)
OSMOLALITY SERPL CALC.SUM OF ELEC: 293 MOSM/KG (ref 275–295)
PLATELET # BLD AUTO: 288 10(3)UL (ref 150–450)
POTASSIUM SERPL-SCNC: 4.5 MMOL/L (ref 3.5–5.1)
PROT SERPL-MCNC: 7.1 G/DL (ref 6.4–8.2)
RBC # BLD AUTO: 4.82 X10(6)UL
SODIUM SERPL-SCNC: 142 MMOL/L (ref 136–145)
T4 FREE SERPL-MCNC: 1.2 NG/DL (ref 0.8–1.7)
TRIGL SERPL-MCNC: 209 MG/DL (ref 30–149)
TSI SER-ACNC: 1.45 MIU/ML (ref 0.36–3.74)
VLDLC SERPL CALC-MCNC: 35 MG/DL (ref 0–30)
WBC # BLD AUTO: 8.3 X10(3) UL (ref 4–11)

## 2022-10-27 PROCEDURE — 80053 COMPREHEN METABOLIC PANEL: CPT | Performed by: INTERNAL MEDICINE

## 2022-10-27 PROCEDURE — 84439 ASSAY OF FREE THYROXINE: CPT | Performed by: INTERNAL MEDICINE

## 2022-10-27 PROCEDURE — 85025 COMPLETE CBC W/AUTO DIFF WBC: CPT | Performed by: INTERNAL MEDICINE

## 2022-10-27 PROCEDURE — 84443 ASSAY THYROID STIM HORMONE: CPT | Performed by: INTERNAL MEDICINE

## 2022-10-27 PROCEDURE — 99214 OFFICE O/P EST MOD 30 MIN: CPT | Performed by: INTERNAL MEDICINE

## 2022-10-27 PROCEDURE — 80061 LIPID PANEL: CPT | Performed by: INTERNAL MEDICINE

## 2022-10-27 PROCEDURE — 3074F SYST BP LT 130 MM HG: CPT | Performed by: INTERNAL MEDICINE

## 2022-10-27 PROCEDURE — 3078F DIAST BP <80 MM HG: CPT | Performed by: INTERNAL MEDICINE

## 2022-10-27 PROCEDURE — 3008F BODY MASS INDEX DOCD: CPT | Performed by: INTERNAL MEDICINE

## 2022-10-27 RX ORDER — GABAPENTIN 100 MG/1
100 CAPSULE ORAL NIGHTLY
Qty: 30 CAPSULE | Refills: 3 | Status: SHIPPED | OUTPATIENT
Start: 2022-10-27

## 2022-11-03 ENCOUNTER — TELEPHONE (OUTPATIENT)
Dept: INTERNAL MEDICINE CLINIC | Facility: CLINIC | Age: 72
End: 2022-11-03

## 2022-11-03 NOTE — TELEPHONE ENCOUNTER
Patient's daughter calling for her mom patient would like  to give the patient a call back. Would like to discuss health concerns with  directly. They wish not to disclose on message.

## 2023-01-09 RX ORDER — PROPRANOLOL HYDROCHLORIDE 20 MG/1
TABLET ORAL
Qty: 60 TABLET | Refills: 11 | Status: SHIPPED | OUTPATIENT
Start: 2023-01-09

## 2023-01-09 RX ORDER — ASPIRIN 81 MG/1
TABLET, COATED ORAL
Qty: 30 TABLET | Refills: 11 | Status: SHIPPED | OUTPATIENT
Start: 2023-01-09

## 2023-01-09 RX ORDER — SIMVASTATIN 10 MG
TABLET ORAL
Qty: 30 TABLET | Refills: 11 | Status: SHIPPED | OUTPATIENT
Start: 2023-01-09

## 2023-01-09 RX ORDER — ALENDRONATE SODIUM 70 MG/1
TABLET ORAL
Qty: 12 TABLET | Refills: 3 | Status: SHIPPED | OUTPATIENT
Start: 2023-01-09

## 2023-01-16 RX ORDER — LEVOTHYROXINE SODIUM 88 UG/1
TABLET ORAL
Qty: 30 TABLET | Refills: 11 | Status: SHIPPED | OUTPATIENT
Start: 2023-01-16

## 2023-01-16 RX ORDER — ALBUTEROL SULFATE 90 UG/1
AEROSOL, METERED RESPIRATORY (INHALATION)
Qty: 18 G | Refills: 3 | Status: SHIPPED | OUTPATIENT
Start: 2023-01-16

## 2023-08-08 RX ORDER — ERGOCALCIFEROL 1.25 MG/1
50000 CAPSULE ORAL WEEKLY
Qty: 30 CAPSULE | Refills: 0 | Status: SHIPPED | OUTPATIENT
Start: 2023-08-08

## 2023-08-08 NOTE — TELEPHONE ENCOUNTER
A refill request was received for:  Requested Prescriptions     Pending Prescriptions Disp Refills    ERGOCALCIFEROL 1.25 MG (49542 UT) Oral Cap [Pharmacy Med Name: VITAMIN D2 50,000IU (ERGO) CAP RX] 30 capsule 0     Sig: TAKE 1 CAPSULE BY MOUTH ONCE WEEKLY     Last refill date:  10/13/22    Last office visit: 10/27/22      Future Appointments   Date Time Provider Tariq Prieto   8/25/2023 12:30 PM Betina Lindquist MD [address]

## 2023-09-26 ENCOUNTER — OFFICE VISIT (OUTPATIENT)
Dept: INTERNAL MEDICINE CLINIC | Facility: CLINIC | Age: 73
End: 2023-09-26
Payer: MEDICAID

## 2023-09-26 VITALS
SYSTOLIC BLOOD PRESSURE: 134 MMHG | BODY MASS INDEX: 25.53 KG/M2 | OXYGEN SATURATION: 97 % | HEIGHT: 61.5 IN | WEIGHT: 137 LBS | HEART RATE: 84 BPM | DIASTOLIC BLOOD PRESSURE: 78 MMHG

## 2023-09-26 DIAGNOSIS — E78.2 MIXED HYPERLIPIDEMIA: ICD-10-CM

## 2023-09-26 DIAGNOSIS — Z12.31 SCREENING MAMMOGRAM, ENCOUNTER FOR: ICD-10-CM

## 2023-09-26 DIAGNOSIS — Z00.00 MEDICARE ANNUAL WELLNESS VISIT, SUBSEQUENT: Primary | ICD-10-CM

## 2023-09-26 DIAGNOSIS — G47.00 INSOMNIA, UNSPECIFIED TYPE: ICD-10-CM

## 2023-09-26 DIAGNOSIS — E55.9 VITAMIN D DEFICIENCY: ICD-10-CM

## 2023-09-26 DIAGNOSIS — E03.9 ACQUIRED HYPOTHYROIDISM: ICD-10-CM

## 2023-09-26 DIAGNOSIS — J43.1 PANLOBULAR EMPHYSEMA (HCC): ICD-10-CM

## 2023-09-26 LAB
ALBUMIN SERPL-MCNC: 3.5 G/DL (ref 3.4–5)
ALBUMIN/GLOB SERPL: 0.9 {RATIO} (ref 1–2)
ALP LIVER SERPL-CCNC: 73 U/L
ALT SERPL-CCNC: 19 U/L
ANION GAP SERPL CALC-SCNC: 4 MMOL/L (ref 0–18)
AST SERPL-CCNC: 13 U/L (ref 15–37)
BASOPHILS # BLD AUTO: 0.03 X10(3) UL (ref 0–0.2)
BASOPHILS NFR BLD AUTO: 0.4 %
BILIRUB SERPL-MCNC: 0.5 MG/DL (ref 0.1–2)
BUN BLD-MCNC: 12 MG/DL (ref 7–18)
BUN/CREAT SERPL: 17.1 (ref 10–20)
CALCIUM BLD-MCNC: 9 MG/DL (ref 8.5–10.1)
CHLORIDE SERPL-SCNC: 110 MMOL/L (ref 98–112)
CHOLEST SERPL-MCNC: 190 MG/DL (ref ?–200)
CO2 SERPL-SCNC: 27 MMOL/L (ref 21–32)
CREAT BLD-MCNC: 0.7 MG/DL
DEPRECATED RDW RBC AUTO: 45.2 FL (ref 35.1–46.3)
EGFRCR SERPLBLD CKD-EPI 2021: 92 ML/MIN/1.73M2 (ref 60–?)
EOSINOPHIL # BLD AUTO: 0.08 X10(3) UL (ref 0–0.7)
EOSINOPHIL NFR BLD AUTO: 1.1 %
ERYTHROCYTE [DISTWIDTH] IN BLOOD BY AUTOMATED COUNT: 12.8 % (ref 11–15)
FASTING PATIENT LIPID ANSWER: YES
FASTING STATUS PATIENT QL REPORTED: YES
GLOBULIN PLAS-MCNC: 3.7 G/DL (ref 2.8–4.4)
GLUCOSE BLD-MCNC: 96 MG/DL (ref 70–99)
HCT VFR BLD AUTO: 48.5 %
HDLC SERPL-MCNC: 43 MG/DL (ref 40–59)
HGB BLD-MCNC: 15.5 G/DL
IMM GRANULOCYTES # BLD AUTO: 0.02 X10(3) UL (ref 0–1)
IMM GRANULOCYTES NFR BLD: 0.3 %
LDLC SERPL CALC-MCNC: 120 MG/DL (ref ?–100)
LYMPHOCYTES # BLD AUTO: 2.01 X10(3) UL (ref 1–4)
LYMPHOCYTES NFR BLD AUTO: 27.5 %
MCH RBC QN AUTO: 30.5 PG (ref 26–34)
MCHC RBC AUTO-ENTMCNC: 32 G/DL (ref 31–37)
MCV RBC AUTO: 95.5 FL
MONOCYTES # BLD AUTO: 0.46 X10(3) UL (ref 0.1–1)
MONOCYTES NFR BLD AUTO: 6.3 %
NEUTROPHILS # BLD AUTO: 4.72 X10 (3) UL (ref 1.5–7.7)
NEUTROPHILS # BLD AUTO: 4.72 X10(3) UL (ref 1.5–7.7)
NEUTROPHILS NFR BLD AUTO: 64.4 %
NONHDLC SERPL-MCNC: 147 MG/DL (ref ?–130)
OSMOLALITY SERPL CALC.SUM OF ELEC: 292 MOSM/KG (ref 275–295)
PLATELET # BLD AUTO: 300 10(3)UL (ref 150–450)
POTASSIUM SERPL-SCNC: 5.1 MMOL/L (ref 3.5–5.1)
PROT SERPL-MCNC: 7.2 G/DL (ref 6.4–8.2)
RBC # BLD AUTO: 5.08 X10(6)UL
SODIUM SERPL-SCNC: 141 MMOL/L (ref 136–145)
T4 FREE SERPL-MCNC: 1.3 NG/DL (ref 0.8–1.7)
TRIGL SERPL-MCNC: 149 MG/DL (ref 30–149)
TSI SER-ACNC: 1.25 MIU/ML (ref 0.36–3.74)
VIT D+METAB SERPL-MCNC: 108.8 NG/ML (ref 30–100)
VLDLC SERPL CALC-MCNC: 26 MG/DL (ref 0–30)
WBC # BLD AUTO: 7.3 X10(3) UL (ref 4–11)

## 2023-09-26 PROCEDURE — 82306 VITAMIN D 25 HYDROXY: CPT | Performed by: INTERNAL MEDICINE

## 2023-09-26 PROCEDURE — 3078F DIAST BP <80 MM HG: CPT | Performed by: INTERNAL MEDICINE

## 2023-09-26 PROCEDURE — 80053 COMPREHEN METABOLIC PANEL: CPT | Performed by: INTERNAL MEDICINE

## 2023-09-26 PROCEDURE — 3075F SYST BP GE 130 - 139MM HG: CPT | Performed by: INTERNAL MEDICINE

## 2023-09-26 PROCEDURE — 80061 LIPID PANEL: CPT | Performed by: INTERNAL MEDICINE

## 2023-09-26 PROCEDURE — G0439 PPPS, SUBSEQ VISIT: HCPCS | Performed by: INTERNAL MEDICINE

## 2023-09-26 PROCEDURE — 84439 ASSAY OF FREE THYROXINE: CPT | Performed by: INTERNAL MEDICINE

## 2023-09-26 PROCEDURE — 84443 ASSAY THYROID STIM HORMONE: CPT | Performed by: INTERNAL MEDICINE

## 2023-09-26 PROCEDURE — 85025 COMPLETE CBC W/AUTO DIFF WBC: CPT | Performed by: INTERNAL MEDICINE

## 2023-09-26 PROCEDURE — 3008F BODY MASS INDEX DOCD: CPT | Performed by: INTERNAL MEDICINE

## 2023-09-26 RX ORDER — HYDROCODONE BITARTRATE AND ACETAMINOPHEN 5; 325 MG/1; MG/1
1 TABLET ORAL EVERY 6 HOURS PRN
Qty: 40 TABLET | Refills: 0 | Status: SHIPPED | OUTPATIENT
Start: 2023-09-26

## 2023-09-26 RX ORDER — PANTOPRAZOLE SODIUM 40 MG/1
40 TABLET, DELAYED RELEASE ORAL
Qty: 90 TABLET | Refills: 3 | Status: SHIPPED | OUTPATIENT
Start: 2023-09-26

## 2023-09-26 RX ORDER — ERGOCALCIFEROL 1.25 MG/1
50000 CAPSULE ORAL WEEKLY
Qty: 12 CAPSULE | Refills: 3 | Status: SHIPPED | OUTPATIENT
Start: 2023-09-26

## 2023-09-26 RX ORDER — TRAZODONE HYDROCHLORIDE 100 MG/1
100 TABLET ORAL NIGHTLY
Qty: 90 TABLET | Refills: 3 | Status: SHIPPED | OUTPATIENT
Start: 2023-09-26 | End: 2024-09-20

## 2023-11-04 RX ORDER — SIMVASTATIN 10 MG
10 TABLET ORAL NIGHTLY
Qty: 30 TABLET | Refills: 11 | Status: SHIPPED | OUTPATIENT
Start: 2023-11-04 | End: 2023-11-06

## 2023-11-06 RX ORDER — SIMVASTATIN 10 MG
10 TABLET ORAL NIGHTLY
Qty: 30 TABLET | Refills: 11 | Status: SHIPPED | OUTPATIENT
Start: 2023-11-06

## 2023-12-27 ENCOUNTER — TELEPHONE (OUTPATIENT)
Dept: INTERNAL MEDICINE CLINIC | Facility: CLINIC | Age: 73
End: 2023-12-27

## 2023-12-27 NOTE — TELEPHONE ENCOUNTER
Patient's daughter, Carlos Rinaldi called the office. Her mother is wheezing & coughing. Just started last night. I suggested her mother go to [facility] or schedule with the nurse practitioner, as Dr. Hali Nelson isn't in the office today, as it is her day off. Her daughter said her mother only speaks [place] and she would like Dr. Hali Nelson to call her mother back. Hoping to get a prescription.

## 2023-12-27 NOTE — TELEPHONE ENCOUNTER
Attempted to contact pt. Unable to reach. Message left on voicemail to go to [facility].

## 2024-01-03 RX ORDER — ASPIRIN 81 MG/1
81 TABLET ORAL DAILY
Qty: 30 TABLET | Refills: 11 | Status: SHIPPED | OUTPATIENT
Start: 2024-01-03

## 2024-01-03 RX ORDER — PROPRANOLOL HYDROCHLORIDE 20 MG/1
20 TABLET ORAL 2 TIMES DAILY
Qty: 60 TABLET | Refills: 11 | Status: SHIPPED | OUTPATIENT
Start: 2024-01-03

## 2024-01-03 NOTE — TELEPHONE ENCOUNTER
Future Appointment:None      Last Appointment:9/26/23      Last Refill:1/9/23      Medication Requested:    Name from pharmacy: ASPIRIN 81MG EC LOW DOSE TABLETS         Will file in chart as: ASPIRIN LOW DOSE 81 MG Oral Tab EC    Sig: TAKE 1 TABLET BY MOUTH EVERY DAY    Disp: 30 tablet    Refills: 11 (Pharmacy requested: Not specified)    Start: 1/3/2024    Class: Normal    Non-formulary    Last ordered: 11 months ago (1/9/2023) by Cristel Arias MD    Last refill: 12/4/2023

## 2024-01-08 ENCOUNTER — HOSPITAL ENCOUNTER (OUTPATIENT)
Age: 74
Discharge: HOME | End: 2024-01-08
Payer: MEDICAID

## 2024-01-08 ENCOUNTER — NURSE TRIAGE (OUTPATIENT)
Dept: INTERNAL MEDICINE CLINIC | Facility: CLINIC | Age: 74
End: 2024-01-08

## 2024-01-08 ENCOUNTER — APPOINTMENT (OUTPATIENT)
Dept: GENERAL RADIOLOGY | Age: 74
End: 2024-01-08
Attending: NURSE PRACTITIONER
Payer: MEDICAID

## 2024-01-08 VITALS
TEMPERATURE: 98 F | OXYGEN SATURATION: 95 % | RESPIRATION RATE: 20 BRPM | SYSTOLIC BLOOD PRESSURE: 146 MMHG | DIASTOLIC BLOOD PRESSURE: 99 MMHG | HEART RATE: 98 BPM

## 2024-01-08 DIAGNOSIS — J18.9 PNEUMONIA OF RIGHT MIDDLE LOBE DUE TO INFECTIOUS ORGANISM: Primary | ICD-10-CM

## 2024-01-08 LAB
POCT INFLUENZA A: NEGATIVE
POCT INFLUENZA B: NEGATIVE
SARS-COV-2 RNA RESP QL NAA+PROBE: NOT DETECTED

## 2024-01-08 PROCEDURE — 99203 OFFICE O/P NEW LOW 30 MIN: CPT | Performed by: NURSE PRACTITIONER

## 2024-01-08 PROCEDURE — U0002 COVID-19 LAB TEST NON-CDC: HCPCS | Performed by: NURSE PRACTITIONER

## 2024-01-08 PROCEDURE — 71046 X-RAY EXAM CHEST 2 VIEWS: CPT | Performed by: NURSE PRACTITIONER

## 2024-01-08 PROCEDURE — 87502 INFLUENZA DNA AMP PROBE: CPT | Performed by: NURSE PRACTITIONER

## 2024-01-08 RX ORDER — FLUTICASONE PROPIONATE 50 MCG
2 SPRAY, SUSPENSION (ML) NASAL DAILY
Qty: 16 G | Refills: 0 | Status: SHIPPED | OUTPATIENT
Start: 2024-01-08 | End: 2024-02-07

## 2024-01-08 RX ORDER — LEVOTHYROXINE SODIUM 88 UG/1
88 TABLET ORAL
Qty: 30 TABLET | Refills: 11 | Status: SHIPPED | OUTPATIENT
Start: 2024-01-08

## 2024-01-08 RX ORDER — LEVOFLOXACIN 750 MG/1
750 TABLET, FILM COATED ORAL DAILY
Qty: 7 TABLET | Refills: 0 | Status: SHIPPED | OUTPATIENT
Start: 2024-01-08 | End: 2024-01-15

## 2024-01-08 NOTE — ED PROVIDER NOTES
Patient Seen in: [facility]      History     Chief Complaint   Patient presents with    Cough/URI     Stated Complaint: Cold  Subjective:   73-year-old female presents for a productive cough and congestion for the past 2 weeks.  She initially had fevers, but those resolved.  She has had some loose stools.  She denies any chest pain or difficulty breathing.  The cough is worse at night.  No sore throat or ear pain.  Her daughter placed her on clindamycin, but she states this antibiotic is not helping her symptoms.  Decreased appetite, but drinking fluids without nausea or vomiting.  No neck stiffness.  No rashes.  No headaches.  No dizziness.  She appears nontoxic.      HISTORY:  No past medical history on file.   No past surgical history on file.   No family history on file.   Social History     Socioeconomic History    Marital status:    Tobacco Use    Smoking status: Every Day     Packs/day: .5     Types: Cigarettes     Start date: 6/26/1967    Smokeless tobacco: Never   Substance and Sexual Activity    Alcohol use: No    Drug use: No    Sexual activity: Not Currently           Review of Systems   HENT:  Positive for congestion.    Respiratory:  Positive for cough.    All other systems reviewed and are negative.      Positive for stated complaint: Cold  Other systems are as noted in HPI.  Constitutional and vital signs reviewed.      All other systems reviewed and negative except as noted above.    Physical Exam     ED Triage Vitals [01/08/24 1110]   BP (!) 146/99   Pulse 98   Resp 20   Temp 97.6 °F (36.4 °C)   Temp src Temporal   SpO2 95 %   O2 Device None (Room air)     Current:BP (!) 146/99   Pulse 98   Temp 97.6 °F (36.4 °C) (Temporal)   Resp 20   SpO2 95%     Physical Exam  Vitals and nursing note reviewed.   Constitutional:       General: She is not in acute distress.     Appearance: Normal appearance. She is not toxic-appearing.   HENT:      Head: Normocephalic.      Right Ear:  Tympanic membrane normal.      Left Ear: Tympanic membrane normal.      Nose: Congestion present. No rhinorrhea.      Mouth/Throat:      Mouth: Mucous membranes are moist.      Pharynx: Oropharynx is clear. No oropharyngeal exudate or posterior oropharyngeal erythema.   Eyes:      Extraocular Movements: Extraocular movements intact.      Conjunctiva/sclera: Conjunctivae normal.      Pupils: Pupils are equal, round, and reactive to light.   Cardiovascular:      Rate and Rhythm: Normal rate and regular rhythm.      Heart sounds: No murmur heard.  Pulmonary:      Effort: Pulmonary effort is normal.      Breath sounds: No wheezing, rhonchi or rales.   Musculoskeletal:         General: Normal range of motion.      Cervical back: Normal range of motion and neck supple.   Lymphadenopathy:      Cervical: No cervical adenopathy.   Skin:     General: Skin is warm and dry.      Capillary Refill: Capillary refill takes less than 2 seconds.   Neurological:      General: No focal deficit present.      Mental Status: She is alert and oriented to person, place, and time.   Psychiatric:         Mood and Affect: Mood normal.         Behavior: Behavior normal.         ED Course     Labs Reviewed   POCT FLU TEST - Normal    Narrative:     This assay is a rapid molecular in vitro test utilizing nucleic acid amplification of influenza A and B viral RNA.   RAPID SARS-COV-2 BY PCR - Normal     XR CHEST PA + LAT CHEST (CPT=71046)          PROCEDURE: XR CHEST PA + LAT CHEST (CPT=71046)     COMPARISON: None.     INDICATIONS: Cough, shortness of breath, and chest pain for 2 weeks.     TECHNIQUE:   Two views.       FINDINGS:   CARDIAC/VASC: The cardiomediastinal silhouette is within normal limits of size.  There is atherosclerotic calcification of the thoracic aorta.  MEDIAST/EJNNIFER: No visible mass or adenopathy.   LUNGS/PLEURA: There is a focal opacity in the right middle lobe, which is concerning for pneumonia.  No focal opacity in the left  lung.  No pleural effusion.  No pneumothorax.  BONES: There are two mild superior endplate compression fracture deformities at T8 and T10.  Multilevel degenerative changes of the thoracic spine.  OTHER: There are surgical clips in the right upper quadrant of the abdomen.              =====  CONCLUSION:      Right middle lobe airspace opacity, which is concerning for pneumonia.  Recommend follow-up chest radiograph in 4-6 weeks to monitor for resolution.     Mild superior endplate compression fracture deformities at T8 and T10, which are age indeterminate.          Dictated by (CST): Lalo King MD on 1/08/2024 at 11:39 AM       Finalized by (CST): Lalo King MD on 1/08/2024 at 11:44 AM                 POCT Flu Test        Specimen Information: Nares; Other      Component Value Flag Ref Range Units Status    POCT INFLUENZA A Negative      Negative  Final    POCT INFLUENZA B Negative      Negative  Final                  Rapid SARS-CoV-2 by PCR        Specimen Information: Nares; Other      Component Value Flag Ref Range Units Status    Rapid SARS-CoV-2 by PCR Not Detected      Not Detected  Final    Comment:    This test is intended for the qualitative detection of nucleic acid from the SARS-CoV-2 viral RNA from individuals who are suspected of COVID-19 infection by their healthcare provider.    A \"Detected\" result is considered a positive test result for COVID-19.   A \"Not Detected\" result for this test means that SARS-CoV-2 RNA was not present in the sample above the limit of detection of the assay.    Test performed using the Abbott ID NOW COVID-19 assay performed on the ID NOW Instrument, [employer] [place], [place] [address].    This test is being used under the Food and Drug Administration's Emergency Use Authorization.    The authorized Fact Sheet for Healthcare Providers for this assay is available upon request from the laboratory.    [facility]  Laboratory   [address] [place] [address]   Phone: [phone]  Fax: [phone]  [facility] # [id number]                       [facility]       Medical Decision Making  The patient is aware of her testing results and her pneumonia.  I will stop her clindamycin and start her on Levaquin due to her allergies.  She has tolerated this antibiotic in the past.  She is asking for refill of her Flonase.  She will continue supportive care and follow-up closely with her primary care doctor.  She is aware if she develops any shortness of breath or other worsening or concerning symptoms, to go to the nearest emergency department for further evaluation.  A Polish  was used during this visit.    Amount and/or Complexity of Data Reviewed  Labs: ordered.     Details: The COVID and flu test are negative.  Radiology: ordered.     Details: The chest x-ray shows a right middle lobe pneumonia.    Risk  OTC drugs.  Prescription drug management.        Disposition and Plan     Clinical Impression:  1. Pneumonia of right middle lobe due to infectious organism         Disposition:  Discharge  1/8/2024 12:08 pm    Follow-up:  PMD    Schedule an appointment as soon as possible for a visit in 3 days            Medications Prescribed:  Discharge Medication List as of 1/8/2024 12:10 PM        START taking these medications    Details   levoFLOXacin 750 MG Oral Tab Take 1 tablet (750 mg total) by mouth daily for 7 days., Normal, Disp-7 tablet, R-0      !! fluticasone propionate 50 MCG/ACT Nasal Suspension 2 sprays by Nasal route daily., Normal, Disp-16 g, R-0       !! - Potential duplicate medications found. Please discuss with provider.

## 2024-01-08 NOTE — TELEPHONE ENCOUNTER
A refill request was received for:  Requested Prescriptions     Pending Prescriptions Disp Refills    LEVOTHYROXINE 88 MCG Oral Tab [Pharmacy Med Name: LEVOTHYROXINE 0.088MG (88MCG) TAB] 30 tablet 11     Sig: TAKE 1 TABLET(88 MCG) BY MOUTH BEFORE BREAKFAST     Last refill date:  1/16/23    Last office visit: 9/26/23      No future appointments.

## 2024-01-08 NOTE — TELEPHONE ENCOUNTER
Patient's daughter, Ivanna called to schedule an appointment to see Dr. Arias.  Dr. Arias doesn't have any openings.  I explained to the daughter that she can schedule an appointment with NP or go to the [facility] or the [facility].  The daughter didn't want to do that.    She wants a return call from the office to her mother to discuss her symptoms.  She has a dry cough and still congested.  Has been sick before Manjit.    Mother speaks only Polish.

## 2024-01-08 NOTE — DISCHARGE INSTRUCTIONS
Drink plenty of fluids.  Rest.  Tylenol as needed for pain or fever.  Stop the clindamycin and start the Levaquin.  Take this antibiotic as prescribed.  Continue supportive care.  Follow-up closely with your primary care doctor.  For any shortness of breath or other worsening or concerning symptoms, please go to the nearest emergency department for further evaluation.

## 2024-01-08 NOTE — TELEPHONE ENCOUNTER
Treating with cold and flu medication. Pt clindamycin 300 mg every 6 hrs x 5 days given by daughter. Robitussin. NO Covid test done.    Pt will go to [facility] today but not sure where she will go     Reason for Disposition   Continuous (nonstop) coughing interferes with work or school and no improvement using cough treatment per Care Advice    Answer Assessment - Initial Assessment Questions  1. ONSET: \"When did the cough begin?\"       2 week  2. SEVERITY: \"How bad is the cough today?\"       Bad not sleeping  3. SPUTUM: \"Describe the color of your sputum\" (none, dry cough; clear, white, yellow, green)      yellow  4. HEMOPTYSIS: \"Are you coughing up any blood?\" If so ask: \"How much?\" (flecks, streaks, tablespoons, etc.)      No  5. DIFFICULTY BREATHING: \"Are you having difficulty breathing?\" If Yes, ask: \"How bad is it?\" (e.g., mild, moderate, severe)     - MILD: No SOB at rest, mild SOB with walking, speaks normally in sentences, can lie down, no retractions, pulse < 100.     - MODERATE: SOB at rest, SOB with minimal exertion and prefers to sit, cannot lie down flat, speaks in phrases, mild retractions, audible wheezing, pulse 100-120.     - SEVERE: Very SOB at rest, speaks in single words, struggling to breathe, sitting hunched forward, retractions, pulse > 120       Mild   6. FEVER: \"Do you have a fever?\" If Yes, ask: \"What is your temperature, how was it measured, and when did it start?\"      No  7. CARDIAC HISTORY: \"Do you have any history of heart disease?\" (e.g., heart attack, congestive heart failure)       No   8. LUNG HISTORY: \"Do you have any history of lung disease?\"  (e.g., pulmonary embolus, asthma, emphysema)      No  9. PE RISK FACTORS: \"Do you have a history of blood clots?\" (or: recent major surgery, recent prolonged travel, bedridden)      No   10. OTHER SYMPTOMS: \"Do you have any other symptoms?\" (e.g., runny nose, wheezing, chest pain)        Stuffy nose with blood tinge/ sore throat / weak   11.  PREGNANCY: \"Is there any chance you are pregnant?\" \"When was your last menstrual period?\"        no  12. TRAVEL: \"Have you traveled out of the country in the last month?\" (e.g., travel history, exposures)        no    Protocols used: Cough-A-OH

## 2024-01-15 ENCOUNTER — APPOINTMENT (OUTPATIENT)
Dept: GENERAL RADIOLOGY | Age: 74
End: 2024-01-15
Attending: NURSE PRACTITIONER
Payer: MEDICAID

## 2024-01-15 ENCOUNTER — HOSPITAL ENCOUNTER (OUTPATIENT)
Age: 74
Discharge: ANOTHER HOSPITAL | End: 2024-01-15
Payer: MEDICAID

## 2024-01-15 VITALS
DIASTOLIC BLOOD PRESSURE: 69 MMHG | OXYGEN SATURATION: 96 % | TEMPERATURE: 97 F | RESPIRATION RATE: 16 BRPM | SYSTOLIC BLOOD PRESSURE: 154 MMHG | HEART RATE: 70 BPM

## 2024-01-15 DIAGNOSIS — R05.1 ACUTE COUGH: Primary | ICD-10-CM

## 2024-01-15 DIAGNOSIS — I44.7 LEFT BUNDLE BRANCH BLOCK: ICD-10-CM

## 2024-01-15 LAB
ATRIAL RATE: 65 BPM
P AXIS: 71 DEGREES
P-R INTERVAL: 162 MS
Q-T INTERVAL: 406 MS
QRS DURATION: 102 MS
QTC CALCULATION (BEZET): 422 MS
R AXIS: -49 DEGREES
T AXIS: 49 DEGREES
VENTRICULAR RATE: 65 BPM

## 2024-01-15 PROCEDURE — 99214 OFFICE O/P EST MOD 30 MIN: CPT | Performed by: NURSE PRACTITIONER

## 2024-01-15 PROCEDURE — 93000 ELECTROCARDIOGRAM COMPLETE: CPT | Performed by: NURSE PRACTITIONER

## 2024-01-15 NOTE — ED INITIAL ASSESSMENT (HPI)
Pt with pneumonia one week ago, states finished antibiotic, states she feels better but pain from cough.

## 2024-01-15 NOTE — ED PROVIDER NOTES
Patient Seen in: [facility]      History     Chief Complaint   Patient presents with    Cough/URI     Stated Complaint: Cough    Subjective:   HPI    72y/o female p/w need for repeat xray, after PNA treatment last week.  Completed all antibiotics.  Feels some congestion and muscle fatigue with coughing, with intercostal muscles.  Not taking any medications for this pain.  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  States she has had 2 episodes yesterday of near syncope, especially when going from sitting to standing.  Denies decreased p.o. intake, thinks this is related to her recent illness.    Objective:   History reviewed. No pertinent past medical history.           History reviewed. No pertinent surgical history.             No pertinent social history.            Review of Systems    Positive for stated complaint: Cough  Other systems are as noted in HPI.  Constitutional and vital signs reviewed.      All other systems reviewed and negative except as noted above.    Physical Exam     ED Triage Vitals [01/15/24 1002]   /69   Pulse 70   Resp 16   Temp 97 °F (36.1 °C)   Temp src Tympanic   SpO2 96 %   O2 Device        Current:/69   Pulse 70   Temp 97 °F (36.1 °C) (Tympanic)   Resp 16   SpO2 96%         Physical Exam  Vitals and nursing note reviewed.   Constitutional:       General: She is not in acute distress.     Appearance: She is not toxic-appearing.   HENT:      Head: Normocephalic.      Nose: Nose normal. No congestion or rhinorrhea.      Mouth/Throat:      Mouth: Mucous membranes are moist.   Cardiovascular:      Rate and Rhythm: Regular rhythm.   Pulmonary:      Effort: Pulmonary effort is normal. No respiratory distress.   Abdominal:      General: Abdomen is flat.   Musculoskeletal:         General: Normal range of motion.      Cervical back: Normal range of motion.   Skin:     General: Skin is warm and dry.      Capillary  Refill: Capillary refill takes less than 2 seconds.   Neurological:      General: No focal deficit present.      Mental Status: She is alert.               ED Course   Labs Reviewed - No data to display  EKG    Rate, intervals and axes as noted on EKG Report.  Rate: 73  Rhythm: LBB, NSR  Reading: LBB, new no comparisons                          MDM                                        Medical Decision Making  73-year-old female presents for post pneumonia x-ray also describes near syncopal episodes x 2 yesterday, is on propranolol for \"heart issue\" no recent EKGs per patient times years.  EKG here with left bundle branch block patient is unaware of this diagnosis recommend that she goes to the ER immediately for further evaluation.No stroke symptoms. Not orthostatic by BP or pulse. 186/86 84 standing, 176/74 88 sitting.  Offered EMS transfer and patient refused. Pt states she will go per herself, neighbor is going to drive her there.     Polish interpretor used for entire interview. Xray deferred at this time.     Shared Decision Making:  Pt requesting discharge from immediate care w/Kinyarwanda interpretor at this time.  Within the construct of shared decision making, the risks of leaving were explained to the patient; including the potential for delayed/missed treatment/diagnosis to lead to deterioration of their condition, permanent disability, and/or death.  The patient is asking appropriate questions, and responding in kind, and indicates understanding of these risks.  The patient appears to be sober and to have capacity to understand their decisions.  The patient was invited to return to the ED for any reason and advised to seem immediate attention for any new or worsening symptoms.    Discussed w/Dr Johnson           Disposition and Plan     Clinical Impression:  1. Acute cough    2. Left bundle branch block         Disposition:  Ic to ed  1/15/2024 10:51 am    Follow-up:  No follow-up provider  specified.        Medications Prescribed:  Current Discharge Medication List

## 2024-01-17 ENCOUNTER — TELEPHONE (OUTPATIENT)
Dept: INTERNAL MEDICINE CLINIC | Facility: CLINIC | Age: 74
End: 2024-01-17

## 2024-01-17 NOTE — TELEPHONE ENCOUNTER
Calvin's daughter called the office demanding an appointment to be seen with Dr. Arias as her mother was in the  [facility] ER.      I explained Dr. Arias isn't in the office today, I could schedule an appointment with the NP.    She didn't want to do that.  Only see Dr. Arias.    I couldn't addend the encounter on 1/8 that the Triage Nurse addend.      She is asking that Dr. Arias please call her mother tomorrow when she gets back to the office.

## 2024-01-18 ENCOUNTER — OFFICE VISIT (OUTPATIENT)
Dept: INTERNAL MEDICINE CLINIC | Facility: CLINIC | Age: 74
End: 2024-01-18
Payer: MEDICAID

## 2024-01-18 VITALS
BODY MASS INDEX: 25.76 KG/M2 | DIASTOLIC BLOOD PRESSURE: 72 MMHG | OXYGEN SATURATION: 100 % | SYSTOLIC BLOOD PRESSURE: 132 MMHG | HEART RATE: 80 BPM | WEIGHT: 138.19 LBS | HEIGHT: 61.5 IN

## 2024-01-18 DIAGNOSIS — H69.91 EUSTACHIAN TUBE DYSFUNCTION, RIGHT: ICD-10-CM

## 2024-01-18 DIAGNOSIS — B37.31 YEAST INFECTION OF THE VAGINA: ICD-10-CM

## 2024-01-18 DIAGNOSIS — J18.9 COMMUNITY ACQUIRED PNEUMONIA OF RIGHT MIDDLE LOBE OF LUNG: Primary | ICD-10-CM

## 2024-01-18 PROCEDURE — 99214 OFFICE O/P EST MOD 30 MIN: CPT | Performed by: INTERNAL MEDICINE

## 2024-01-18 PROCEDURE — 3075F SYST BP GE 130 - 139MM HG: CPT | Performed by: INTERNAL MEDICINE

## 2024-01-18 PROCEDURE — 3008F BODY MASS INDEX DOCD: CPT | Performed by: INTERNAL MEDICINE

## 2024-01-18 PROCEDURE — 3078F DIAST BP <80 MM HG: CPT | Performed by: INTERNAL MEDICINE

## 2024-01-18 RX ORDER — PREDNISONE 10 MG/1
10 TABLET ORAL 2 TIMES DAILY
Qty: 20 TABLET | Refills: 0 | Status: SHIPPED | OUTPATIENT
Start: 2024-01-18

## 2024-01-18 RX ORDER — LEVOCETIRIZINE DIHYDROCHLORIDE 5 MG/1
5 TABLET, FILM COATED ORAL EVERY EVENING
Qty: 90 TABLET | Refills: 0 | OUTPATIENT
Start: 2024-01-18

## 2024-01-18 RX ORDER — FLUCONAZOLE 100 MG/1
100 TABLET ORAL DAILY
Qty: 3 TABLET | Refills: 0 | Status: SHIPPED | OUTPATIENT
Start: 2024-01-18

## 2024-01-18 RX ORDER — LEVOCETIRIZINE DIHYDROCHLORIDE 5 MG/1
5 TABLET, FILM COATED ORAL EVERY EVENING
Qty: 30 TABLET | Refills: 0 | Status: SHIPPED | OUTPATIENT
Start: 2024-01-18

## 2024-01-18 NOTE — PROGRESS NOTES
Subjective:   Dorys Pickens is a 73 year old female who presents for Follow - Up     Patient here for a fu from [facility] she was seen for CAP treated initaily with clindamycin and later Levaquin .  Currently has pluggeed ears and head and a small residual cough.  Cxr has since cleared up by fu cxr. Has vaginal itch after AB.  History/Other:    Chief Complaint Reviewed and Verified  No Further Nursing Notes to   Review  Problem List Reviewed         Tobacco:  She currently smokes tobacco.  Social History    Tobacco Use      Smoking status: Every Day        Packs/day: .5        Types: Cigarettes        Start date: 6/26/1967      Smokeless tobacco: Never     Tobacco Cessation Documentation (Smoking and Smokeless included):  I had an in depth therapy session with Dorys Pickens about her tobacco use risks and options using the USPSTF's Five A's approach:    Ask: Dorys is using tobacco products.  Assess: We asked about/assessed behavioral health risk and factors affecting choice of behavior change goals/methods.  Specifically I asked about readiness to quit tobacco.  Advise: We gave clear, specific, and personalized behavior change advice, including information about personal health harms and benefits. Specifically, she was told that Quitting tobacco is one of the best things she can do for her health. I strongly encouraged her to quit.      Agree: We collaboratively selected appropriate treatment goals and methods based on the patient’s interest in and willingness to change the behavior.   Assist: We used behavior change techniques (self-help and/or counseling), to aid Dorys in achieving agreed-upon goals by acquiring the skills, confidence, and social/environmental supports for behavior change, supplemented with adjunctive medical treatments when appropriate. Additionally, national quitting tobacco aides were discussed.   Arrange: Follow up at next visit- see specific follow up below.    Time Counseled: 5 minutes        Current Outpatient Medications   Medication Sig Dispense Refill    predniSONE 10 MG Oral Tab Take 1 tablet (10 mg total) by mouth 2 (two) times daily. 20 tablet 0    levocetirizine 5 MG Oral Tab Take 1 tablet (5 mg total) by mouth every evening. 30 tablet 0    fluconazole 100 MG Oral Tab Take 1 tablet (100 mg total) by mouth daily. 3 tablet 0    levothyroxine 88 MCG Oral Tab Take 1 tablet (88 mcg total) by mouth before breakfast. 30 tablet 11    fluticasone propionate 50 MCG/ACT Nasal Suspension 2 sprays by Nasal route daily. 16 g 0    propranolol 20 MG Oral Tab Take 1 tablet (20 mg total) by mouth 2 (two) times daily. 60 tablet 11    aspirin (ASPIRIN LOW DOSE) 81 MG Oral Tab EC Take 1 tablet (81 mg total) by mouth daily. 30 tablet 11    simvastatin 10 MG Oral Tab Take 1 tablet (10 mg total) by mouth nightly. 30 tablet 11    ergocalciferol 1.25 MG (36544 UT) Oral Cap Take 1 capsule (50,000 Units total) by mouth once a week. 12 capsule 3    traZODone 100 MG Oral Tab Take 1 tablet (100 mg total) by mouth nightly. 90 tablet 3    pantoprazole 40 MG Oral Tab EC Take 1 tablet (40 mg total) by mouth every morning before breakfast. 90 tablet 3    HYDROcodone-acetaminophen (NORCO) 5-325 MG Oral Tab Take 1 tablet by mouth every 6 (six) hours as needed for Pain. 40 tablet 0    ALBUTEROL 108 (90 Base) MCG/ACT Inhalation Aero Soln INHALE 2 PUFFS EVERY 4 HOURS AS NEEDED FOR WHEEZING 18 g 3    ALENDRONATE 70 MG Oral Tab TAKE 1 TABLET BY MOUTH EVERY WEEK 12 tablet 3    gabapentin 100 MG Oral Cap Take 1 capsule (100 mg total) by mouth nightly. 30 capsule 3    FLUTICASONE PROPIONATE 50 MCG/ACT Nasal Suspension SHAKE LIQUID AND INSTILL 2 SPRAYS IN EACH NOSTRIL EVERY DAY 16 g 0    LORazepam 0.5 MG Oral Tab Take 1 tablet (0.5 mg total) by mouth every 6 (six) hours as needed for Anxiety. 30 tablet 3    nystatin-triamcinolone 558271-3.1 UNIT/GM-% External Cream Topically bid 1 Tube 3    furosemide 40 MG Oral Tab Take 1 tablet (40 mg  total) by mouth daily. 30 tablet 1    Doxycycline Hyclate 100 MG Oral Cap Take 1 capsule (100 mg total) by mouth 2 (two) times daily. 20 capsule 0    Albuterol Sulfate HFA (VENTOLIN HFA) 108 (90 Base) MCG/ACT Inhalation Aero Soln Inhale 2 puffs into the lungs every 4 (four) hours as needed for Wheezing. 1 Inhaler 6    ibuprofen 600 MG Oral Tab Take 1 tablet (600 mg total) by mouth every 6 (six) hours as needed for Pain. 60 tablet 1    Cyclobenzaprine HCl 10 MG Oral Tab Take 1 tablet (10 mg total) by mouth 3 (three) times daily as needed for Muscle spasms. 30 tablet 0    traMADol HCl 50 MG Oral Tab Take 1 tablet (50 mg total) by mouth every 6 (six) hours as needed for Pain. 40 tablet 1    Diclofenac Sodium (VOLTAREN) 1 % Transdermal Gel Apply 2 g topically 4 (four) times daily. 5 Tube 11    Diclofenac Sodium 50 MG Oral Tab EC Take 1 tablet (50 mg total) by mouth 2 (two) times daily. 60 tablet 5    triamcinolone acetonide 0.1 % External Cream Apply topically 2 (two) times daily as needed. 60 g 3    FUNGOID TINCTURE 2 % External Solution APPLY AA D  2    ibuprofen 400 MG Oral Tab Take 1 tablet (400 mg total) by mouth every 6 (six) hours as needed for Pain. 60 tablet 11    clotrimazole-betamethasone 1-0.05 % External Cream Topically bid 60 g 3         Review of Systems:  Review of Systems   Constitutional:  Positive for fatigue.   HENT:  Positive for hearing loss and postnasal drip.    Respiratory:  Positive for wheezing.          Objective:   /72   Pulse 80   Ht 5' 1.5\" (1.562 m)   Wt 138 lb 3.2 oz (62.7 kg)   SpO2 100%   BMI 25.69 kg/m²  Estimated body mass index is 25.69 kg/m² as calculated from the following:    Height as of this encounter: 5' 1.5\" (1.562 m).    Weight as of this encounter: 138 lb 3.2 oz (62.7 kg).  Physical Exam  Constitutional:       Appearance: She is normal weight.   HENT:      Head: Normocephalic and atraumatic.      Right Ear: Ear canal normal.      Left Ear: Ear canal normal.    Eyes:      General: No scleral icterus.     Pupils: Pupils are equal, round, and reactive to light.   Cardiovascular:      Rate and Rhythm: Normal rate and regular rhythm.   Pulmonary:      Breath sounds: Wheezing present.   Abdominal:      Palpations: Abdomen is soft.      Tenderness: There is no abdominal tenderness.   Musculoskeletal:      Cervical back: Neck supple.      Right lower leg: No edema.      Left lower leg: No edema.   Neurological:      Mental Status: She is alert.           Assessment & Plan:   1. Community acquired pneumonia of right middle lobe of lung (Primary) resolved  2. Eustachian tube dysfunction, right Zyxal 10 mg daily prednisone 10mg bid x 10 days  3. Yeast infection of the vagina Diflucan 150 mg  x 3 days  Other orders  -     predniSONE; Take 1 tablet (10 mg total) by mouth 2 (two) times daily.  Dispense: 20 tablet; Refill: 0  -     Levocetirizine Dihydrochloride; Take 1 tablet (5 mg total) by mouth every evening.  Dispense: 30 tablet; Refill: 0  -     Fluconazole; Take 1 tablet (100 mg total) by mouth daily.  Dispense: 3 tablet; Refill: 0        No follow-ups on file.    Cristel Arias MD, 1/18/2024, 2:04 PM

## 2024-01-25 RX ORDER — ALENDRONATE SODIUM 70 MG/1
TABLET ORAL
Qty: 12 TABLET | Refills: 3 | Status: SHIPPED | OUTPATIENT
Start: 2024-01-25

## 2024-01-25 NOTE — TELEPHONE ENCOUNTER
A refill request was received for:  Requested Prescriptions     Pending Prescriptions Disp Refills    ALENDRONATE 70 MG Oral Tab [Pharmacy Med Name: ALENDRONATE 70MG TABLETS] 12 tablet 3     Sig: TAKE 1 TABLET BY MOUTH EVERY WEEK     Last refill date:  1/9/23    Last office visit: 1/18/24      Future Appointments   Date Time Provider Department Center   2/26/2024  1:30 PM Cristel Arias MD [facility] [address]

## 2024-02-06 ENCOUNTER — TELEPHONE (OUTPATIENT)
Dept: INTERNAL MEDICINE CLINIC | Facility: CLINIC | Age: 74
End: 2024-02-06

## 2024-02-06 NOTE — TELEPHONE ENCOUNTER
Patient is calling asking for a call back from the nurse.  She speaks very little English and isn't feeling well.

## 2024-02-13 ENCOUNTER — TELEPHONE (OUTPATIENT)
Dept: INTERNAL MEDICINE CLINIC | Facility: CLINIC | Age: 74
End: 2024-02-13

## 2024-02-13 DIAGNOSIS — H90.2 CONDUCTIVE HEARING LOSS, UNSPECIFIED LATERALITY: Primary | ICD-10-CM

## 2024-02-13 DIAGNOSIS — H93.8X9 CLOGGED EAR, UNSPECIFIED LATERALITY: ICD-10-CM

## 2024-02-13 DIAGNOSIS — H92.03 OTALGIA OF BOTH EARS: ICD-10-CM

## 2024-02-13 NOTE — TELEPHONE ENCOUNTER
Patient's daughter, Ivanna called asking if a referral can please be completed for her mom.    She said she has seen Dr. Didi Pino,Otolaryngologyin the past and she would like to see this doctor again.    She said her mother's ear is clogged, trouble hearing out of it and some pain.

## 2024-03-19 ENCOUNTER — OFFICE VISIT (OUTPATIENT)
Dept: INTERNAL MEDICINE CLINIC | Facility: CLINIC | Age: 74
End: 2024-03-19
Payer: MEDICAID

## 2024-03-19 VITALS
HEART RATE: 68 BPM | BODY MASS INDEX: 25.91 KG/M2 | DIASTOLIC BLOOD PRESSURE: 70 MMHG | HEIGHT: 61.5 IN | SYSTOLIC BLOOD PRESSURE: 140 MMHG | OXYGEN SATURATION: 97 % | WEIGHT: 139 LBS

## 2024-03-19 DIAGNOSIS — E03.9 ACQUIRED HYPOTHYROIDISM: ICD-10-CM

## 2024-03-19 DIAGNOSIS — R20.9 BILATERAL COLD FEET: ICD-10-CM

## 2024-03-19 DIAGNOSIS — F51.01 PRIMARY INSOMNIA: ICD-10-CM

## 2024-03-19 DIAGNOSIS — G93.31 POST VIRAL SYNDROME: ICD-10-CM

## 2024-03-19 DIAGNOSIS — J43.1 PANLOBULAR EMPHYSEMA (HCC): Primary | ICD-10-CM

## 2024-03-19 LAB
ALBUMIN SERPL-MCNC: 4.4 G/DL (ref 3.2–4.8)
ALBUMIN/GLOB SERPL: 1.6 {RATIO} (ref 1–2)
ALP LIVER SERPL-CCNC: 67 U/L
ALT SERPL-CCNC: 11 U/L
ANION GAP SERPL CALC-SCNC: 3 MMOL/L (ref 0–18)
AST SERPL-CCNC: 18 U/L (ref ?–34)
BASOPHILS # BLD AUTO: 0.05 X10(3) UL (ref 0–0.2)
BASOPHILS NFR BLD AUTO: 0.7 %
BILIRUB SERPL-MCNC: 0.5 MG/DL (ref 0.2–1.1)
BUN BLD-MCNC: 10 MG/DL (ref 9–23)
BUN/CREAT SERPL: 14.3 (ref 10–20)
CALCIUM BLD-MCNC: 9.6 MG/DL (ref 8.7–10.4)
CHLORIDE SERPL-SCNC: 111 MMOL/L (ref 98–112)
CHOLEST SERPL-MCNC: 231 MG/DL (ref ?–200)
CO2 SERPL-SCNC: 26 MMOL/L (ref 21–32)
CREAT BLD-MCNC: 0.7 MG/DL
DEPRECATED RDW RBC AUTO: 46.4 FL (ref 35.1–46.3)
EGFRCR SERPLBLD CKD-EPI 2021: 91 ML/MIN/1.73M2 (ref 60–?)
EOSINOPHIL # BLD AUTO: 0.06 X10(3) UL (ref 0–0.7)
EOSINOPHIL NFR BLD AUTO: 0.9 %
ERYTHROCYTE [DISTWIDTH] IN BLOOD BY AUTOMATED COUNT: 13.3 % (ref 11–15)
FASTING PATIENT LIPID ANSWER: YES
FASTING STATUS PATIENT QL REPORTED: YES
GLOBULIN PLAS-MCNC: 2.7 G/DL (ref 2.8–4.4)
GLUCOSE BLD-MCNC: 104 MG/DL (ref 70–99)
HCT VFR BLD AUTO: 47.6 %
HDLC SERPL-MCNC: 45 MG/DL (ref 40–59)
HGB BLD-MCNC: 15.4 G/DL
IMM GRANULOCYTES # BLD AUTO: 0.02 X10(3) UL (ref 0–1)
IMM GRANULOCYTES NFR BLD: 0.3 %
LDLC SERPL CALC-MCNC: 158 MG/DL (ref ?–100)
LYMPHOCYTES # BLD AUTO: 1.81 X10(3) UL (ref 1–4)
LYMPHOCYTES NFR BLD AUTO: 26.6 %
MCH RBC QN AUTO: 30.3 PG (ref 26–34)
MCHC RBC AUTO-ENTMCNC: 32.4 G/DL (ref 31–37)
MCV RBC AUTO: 93.7 FL
MONOCYTES # BLD AUTO: 0.35 X10(3) UL (ref 0.1–1)
MONOCYTES NFR BLD AUTO: 5.1 %
NEUTROPHILS # BLD AUTO: 4.52 X10 (3) UL (ref 1.5–7.7)
NEUTROPHILS # BLD AUTO: 4.52 X10(3) UL (ref 1.5–7.7)
NEUTROPHILS NFR BLD AUTO: 66.4 %
NONHDLC SERPL-MCNC: 186 MG/DL (ref ?–130)
OSMOLALITY SERPL CALC.SUM OF ELEC: 289 MOSM/KG (ref 275–295)
PLATELET # BLD AUTO: 308 10(3)UL (ref 150–450)
POTASSIUM SERPL-SCNC: 5 MMOL/L (ref 3.5–5.1)
PROT SERPL-MCNC: 7.1 G/DL (ref 5.7–8.2)
RBC # BLD AUTO: 5.08 X10(6)UL
SODIUM SERPL-SCNC: 140 MMOL/L (ref 136–145)
T4 FREE SERPL-MCNC: 1.5 NG/DL (ref 0.8–1.7)
TRIGL SERPL-MCNC: 155 MG/DL (ref 30–149)
TSI SER-ACNC: 2.03 MIU/ML (ref 0.55–4.78)
VLDLC SERPL CALC-MCNC: 30 MG/DL (ref 0–30)
WBC # BLD AUTO: 6.8 X10(3) UL (ref 4–11)

## 2024-03-19 PROCEDURE — 80053 COMPREHEN METABOLIC PANEL: CPT | Performed by: INTERNAL MEDICINE

## 2024-03-19 PROCEDURE — 84439 ASSAY OF FREE THYROXINE: CPT | Performed by: INTERNAL MEDICINE

## 2024-03-19 PROCEDURE — 85025 COMPLETE CBC W/AUTO DIFF WBC: CPT | Performed by: INTERNAL MEDICINE

## 2024-03-19 PROCEDURE — 80061 LIPID PANEL: CPT | Performed by: INTERNAL MEDICINE

## 2024-03-19 PROCEDURE — 99214 OFFICE O/P EST MOD 30 MIN: CPT | Performed by: INTERNAL MEDICINE

## 2024-03-19 PROCEDURE — 84443 ASSAY THYROID STIM HORMONE: CPT | Performed by: INTERNAL MEDICINE

## 2024-03-19 RX ORDER — ALBUTEROL SULFATE 2.5 MG/3ML
2.5 SOLUTION RESPIRATORY (INHALATION) EVERY 4 HOURS PRN
Qty: 90 EACH | Refills: 5 | Status: SHIPPED | OUTPATIENT
Start: 2024-03-19 | End: 2024-04-02

## 2024-03-19 RX ORDER — FLUCONAZOLE 150 MG/1
150 TABLET ORAL ONCE
Qty: 3 TABLET | Refills: 1 | Status: SHIPPED | OUTPATIENT
Start: 2024-03-19 | End: 2024-03-19

## 2024-03-19 RX ORDER — ZOLPIDEM TARTRATE 10 MG/1
10 TABLET ORAL NIGHTLY PRN
Qty: 30 TABLET | Refills: 3 | Status: SHIPPED | OUTPATIENT
Start: 2024-03-19

## 2024-03-19 NOTE — PROGRESS NOTES
Subjective:   Dorys Pickens is a 73 year old female who presents for Follow - Up       History/Other:    Chief Complaint Reviewed and Verified  No Further Nursing Notes to   Review  Medications Reviewed  Problem List Reviewed         Tobacco:  She currently smokes tobacco.  Social History    Tobacco Use      Smoking status: Every Day        Packs/day: .5        Types: Cigarettes        Start date: 6/26/1967      Smokeless tobacco: Never     Tobacco Cessation Documentation (Smoking and Smokeless included):  I had an in depth therapy session with Dorys Pickens about her tobacco use risks and options using the USPSTF's Five A's approach:    Ask: Dorys is using tobacco products.  Assess: We asked about/assessed behavioral health risk and factors affecting choice of behavior change goals/methods.  Specifically I asked about readiness to quit tobacco.  Advise: We gave clear, specific, and personalized behavior change advice, including information about personal health harms and benefits. Specifically, she was told that Quitting tobacco is one of the best things she can do for her health. I strongly encouraged her to quit.      Agree: We collaboratively selected appropriate treatment goals and methods based on the patient’s interest in and willingness to change the behavior.   Assist: We used behavior change techniques (self-help and/or counseling), to aid Dorys in achieving agreed-upon goals by acquiring the skills, confidence, and social/environmental supports for behavior change, supplemented with adjunctive medical treatments when appropriate. Additionally, national quitting tobacco aides were discussed.   Arrange: Follow up at next visit- see specific follow up below.    Time Counseled: 5 minutes       Current Outpatient Medications   Medication Sig Dispense Refill    fluconazole (DIFLUCAN) 150 MG Oral Tab Take 1 tablet (150 mg total) by mouth once for 1 dose. 3 tablet 1    alendronate 70 MG Oral Tab TAKE 1 TABLET  BY MOUTH EVERY WEEK 12 tablet 3    predniSONE 10 MG Oral Tab Take 1 tablet (10 mg total) by mouth 2 (two) times daily. 20 tablet 0    levocetirizine 5 MG Oral Tab Take 1 tablet (5 mg total) by mouth every evening. 30 tablet 0    fluconazole 100 MG Oral Tab Take 1 tablet (100 mg total) by mouth daily. 3 tablet 0    levothyroxine 88 MCG Oral Tab Take 1 tablet (88 mcg total) by mouth before breakfast. 30 tablet 11    propranolol 20 MG Oral Tab Take 1 tablet (20 mg total) by mouth 2 (two) times daily. 60 tablet 11    aspirin (ASPIRIN LOW DOSE) 81 MG Oral Tab EC Take 1 tablet (81 mg total) by mouth daily. 30 tablet 11    simvastatin 10 MG Oral Tab Take 1 tablet (10 mg total) by mouth nightly. 30 tablet 11    ergocalciferol 1.25 MG (18051 UT) Oral Cap Take 1 capsule (50,000 Units total) by mouth once a week. 12 capsule 3    traZODone 100 MG Oral Tab Take 1 tablet (100 mg total) by mouth nightly. 90 tablet 3    pantoprazole 40 MG Oral Tab EC Take 1 tablet (40 mg total) by mouth every morning before breakfast. 90 tablet 3    HYDROcodone-acetaminophen (NORCO) 5-325 MG Oral Tab Take 1 tablet by mouth every 6 (six) hours as needed for Pain. 40 tablet 0    ALBUTEROL 108 (90 Base) MCG/ACT Inhalation Aero Soln INHALE 2 PUFFS EVERY 4 HOURS AS NEEDED FOR WHEEZING 18 g 3    gabapentin 100 MG Oral Cap Take 1 capsule (100 mg total) by mouth nightly. 30 capsule 3    FLUTICASONE PROPIONATE 50 MCG/ACT Nasal Suspension SHAKE LIQUID AND INSTILL 2 SPRAYS IN EACH NOSTRIL EVERY DAY 16 g 0    LORazepam 0.5 MG Oral Tab Take 1 tablet (0.5 mg total) by mouth every 6 (six) hours as needed for Anxiety. 30 tablet 3    nystatin-triamcinolone 534931-5.1 UNIT/GM-% External Cream Topically bid 1 Tube 3    furosemide 40 MG Oral Tab Take 1 tablet (40 mg total) by mouth daily. 30 tablet 1    Doxycycline Hyclate 100 MG Oral Cap Take 1 capsule (100 mg total) by mouth 2 (two) times daily. 20 capsule 0    Albuterol Sulfate HFA (VENTOLIN HFA) 108 (90 Base)  MCG/ACT Inhalation Aero Soln Inhale 2 puffs into the lungs every 4 (four) hours as needed for Wheezing. 1 Inhaler 6    ibuprofen 600 MG Oral Tab Take 1 tablet (600 mg total) by mouth every 6 (six) hours as needed for Pain. 60 tablet 1    Cyclobenzaprine HCl 10 MG Oral Tab Take 1 tablet (10 mg total) by mouth 3 (three) times daily as needed for Muscle spasms. 30 tablet 0    traMADol HCl 50 MG Oral Tab Take 1 tablet (50 mg total) by mouth every 6 (six) hours as needed for Pain. 40 tablet 1    Diclofenac Sodium (VOLTAREN) 1 % Transdermal Gel Apply 2 g topically 4 (four) times daily. 5 Tube 11    Diclofenac Sodium 50 MG Oral Tab EC Take 1 tablet (50 mg total) by mouth 2 (two) times daily. 60 tablet 5    triamcinolone acetonide 0.1 % External Cream Apply topically 2 (two) times daily as needed. 60 g 3    FUNGOID TINCTURE 2 % External Solution APPLY AA D  2    ibuprofen 400 MG Oral Tab Take 1 tablet (400 mg total) by mouth every 6 (six) hours as needed for Pain. 60 tablet 11    clotrimazole-betamethasone 1-0.05 % External Cream Topically bid 60 g 3         Review of Systems:  Review of Systems   Constitutional:  Positive for fatigue.   Respiratory:  Positive for shortness of breath. Negative for cough.    Cardiovascular:  Negative for chest pain, palpitations and leg swelling.   Endocrine:        Hypothyroidism   Psychiatric/Behavioral:  The patient is nervous/anxious.          Objective:   /70   Pulse 68   Ht 5' 1.5\" (1.562 m)   Wt 139 lb (63 kg)   SpO2 97%   BMI 25.84 kg/m²  Estimated body mass index is 25.84 kg/m² as calculated from the following:    Height as of this encounter: 5' 1.5\" (1.562 m).    Weight as of this encounter: 139 lb (63 kg).  Physical Exam  Constitutional:       Appearance: She is normal weight.   HENT:      Head: Normocephalic.   Eyes:      General: No scleral icterus.     Pupils: Pupils are equal, round, and reactive to light.   Neck:      Vascular: No carotid bruit.   Cardiovascular:       Rate and Rhythm: Normal rate and regular rhythm.   Pulmonary:      Effort: Pulmonary effort is normal.      Breath sounds: No wheezing.   Abdominal:      Palpations: Abdomen is soft.      Tenderness: There is no abdominal tenderness.   Musculoskeletal:      Right lower leg: No edema.      Left lower leg: No edema.   Skin:     Findings: No lesion.   Neurological:      Mental Status: She is alert. Mental status is at baseline.   Psychiatric:         Thought Content: Thought content normal.           Assessment & Plan:   1. Panlobular emphysema (HCC) (Primary) smoking cessation advised - continue Albuterol inhaler and nebulizer rx  2. Post viral syndrome - no further rx at this time    3.   Cold feet - smoker - arterial doppler  4. Insomnia - Zolpidem 10mg     No follow-ups on file.    Cristel Arias MD, 3/19/2024, 1:20 PM

## 2024-05-28 ENCOUNTER — HOSPITAL ENCOUNTER (OUTPATIENT)
Dept: ULTRASOUND IMAGING | Facility: HOSPITAL | Age: 74
Discharge: HOME | End: 2024-05-28
Attending: INTERNAL MEDICINE

## 2024-05-28 DIAGNOSIS — R20.9 BILATERAL COLD FEET: ICD-10-CM

## 2024-05-28 PROCEDURE — 93923 UPR/LXTR ART STDY 3+ LVLS: CPT | Performed by: INTERNAL MEDICINE

## 2024-06-20 ENCOUNTER — APPOINTMENT (OUTPATIENT)
Dept: GENERAL RADIOLOGY | Facility: HOSPITAL | Age: 74
End: 2024-06-20
Attending: EMERGENCY MEDICINE

## 2024-06-20 ENCOUNTER — HOSPITAL ENCOUNTER (OUTPATIENT)
Age: 74
Discharge: ANOTHER HOSPITAL | End: 2024-06-20

## 2024-06-20 ENCOUNTER — HOSPITAL ENCOUNTER (EMERGENCY)
Facility: HOSPITAL | Age: 74
Discharge: HOME | End: 2024-06-20
Attending: EMERGENCY MEDICINE

## 2024-06-20 ENCOUNTER — APPOINTMENT (OUTPATIENT)
Dept: CT IMAGING | Facility: HOSPITAL | Age: 74
End: 2024-06-20
Attending: EMERGENCY MEDICINE

## 2024-06-20 VITALS
OXYGEN SATURATION: 98 % | SYSTOLIC BLOOD PRESSURE: 162 MMHG | WEIGHT: 138 LBS | TEMPERATURE: 97 F | HEART RATE: 66 BPM | BODY MASS INDEX: 25.4 KG/M2 | RESPIRATION RATE: 16 BRPM | HEIGHT: 62 IN | DIASTOLIC BLOOD PRESSURE: 69 MMHG

## 2024-06-20 VITALS
WEIGHT: 138 LBS | BODY MASS INDEX: 25.4 KG/M2 | TEMPERATURE: 98 F | RESPIRATION RATE: 17 BRPM | SYSTOLIC BLOOD PRESSURE: 127 MMHG | HEIGHT: 62 IN | OXYGEN SATURATION: 100 % | DIASTOLIC BLOOD PRESSURE: 64 MMHG | HEART RATE: 62 BPM

## 2024-06-20 DIAGNOSIS — R51.9 LEFT TEMPORAL HEADACHE: Primary | ICD-10-CM

## 2024-06-20 DIAGNOSIS — M25.521 RIGHT ELBOW PAIN: ICD-10-CM

## 2024-06-20 DIAGNOSIS — R51.9 LEFT-SIDED HEADACHE: Primary | ICD-10-CM

## 2024-06-20 DIAGNOSIS — R53.1 WEAKNESS: ICD-10-CM

## 2024-06-20 DIAGNOSIS — M25.551 RIGHT HIP PAIN: ICD-10-CM

## 2024-06-20 LAB
ALBUMIN SERPL-MCNC: 4.4 G/DL (ref 3.2–4.8)
ALBUMIN/GLOB SERPL: 2 {RATIO} (ref 1–2)
ALP LIVER SERPL-CCNC: 74 U/L
ALT SERPL-CCNC: 10 U/L
ANION GAP SERPL CALC-SCNC: 4 MMOL/L (ref 0–18)
AST SERPL-CCNC: 15 U/L (ref ?–34)
ATRIAL RATE: 65 BPM
BASOPHILS # BLD AUTO: 0.06 X10(3) UL (ref 0–0.2)
BASOPHILS NFR BLD AUTO: 0.8 %
BILIRUB SERPL-MCNC: 0.5 MG/DL (ref 0.2–1.1)
BUN BLD-MCNC: 7 MG/DL (ref 9–23)
BUN/CREAT SERPL: 10.3 (ref 10–20)
CALCIUM BLD-MCNC: 9.4 MG/DL (ref 8.7–10.4)
CHLORIDE SERPL-SCNC: 112 MMOL/L (ref 98–112)
CO2 SERPL-SCNC: 27 MMOL/L (ref 21–32)
CREAT BLD-MCNC: 0.68 MG/DL
DEPRECATED RDW RBC AUTO: 41.1 FL (ref 35.1–46.3)
EGFRCR SERPLBLD CKD-EPI 2021: 92 ML/MIN/1.73M2 (ref 60–?)
EOSINOPHIL # BLD AUTO: 0.06 X10(3) UL (ref 0–0.7)
EOSINOPHIL NFR BLD AUTO: 0.8 %
ERYTHROCYTE [DISTWIDTH] IN BLOOD BY AUTOMATED COUNT: 12.3 % (ref 11–15)
GLOBULIN PLAS-MCNC: 2.2 G/DL (ref 2–3.5)
GLUCOSE BLD-MCNC: 107 MG/DL (ref 70–99)
GLUCOSE BLDC GLUCOMTR-MCNC: 107 MG/DL (ref 70–99)
HCT VFR BLD AUTO: 42.6 %
HGB BLD-MCNC: 14.8 G/DL
IMM GRANULOCYTES # BLD AUTO: 0.03 X10(3) UL (ref 0–1)
IMM GRANULOCYTES NFR BLD: 0.4 %
LYMPHOCYTES # BLD AUTO: 1.96 X10(3) UL (ref 1–4)
LYMPHOCYTES NFR BLD AUTO: 26.8 %
MCH RBC QN AUTO: 31.1 PG (ref 26–34)
MCHC RBC AUTO-ENTMCNC: 34.7 G/DL (ref 31–37)
MCV RBC AUTO: 89.5 FL
MONOCYTES # BLD AUTO: 0.45 X10(3) UL (ref 0.1–1)
MONOCYTES NFR BLD AUTO: 6.1 %
NEUTROPHILS # BLD AUTO: 4.76 X10 (3) UL (ref 1.5–7.7)
NEUTROPHILS # BLD AUTO: 4.76 X10(3) UL (ref 1.5–7.7)
NEUTROPHILS NFR BLD AUTO: 65.1 %
OSMOLALITY SERPL CALC.SUM OF ELEC: 294 MOSM/KG (ref 275–295)
P AXIS: 61 DEGREES
P-R INTERVAL: 162 MS
PLATELET # BLD AUTO: 253 10(3)UL (ref 150–450)
POTASSIUM SERPL-SCNC: 4.5 MMOL/L (ref 3.5–5.1)
PROT SERPL-MCNC: 6.6 G/DL (ref 5.7–8.2)
Q-T INTERVAL: 416 MS
QRS DURATION: 100 MS
QTC CALCULATION (BEZET): 432 MS
R AXIS: -57 DEGREES
RBC # BLD AUTO: 4.76 X10(6)UL
SODIUM SERPL-SCNC: 143 MMOL/L (ref 136–145)
T AXIS: 46 DEGREES
TROPONIN I SERPL HS-MCNC: 3 NG/L
VENTRICULAR RATE: 65 BPM
WBC # BLD AUTO: 7.3 X10(3) UL (ref 4–11)

## 2024-06-20 PROCEDURE — 99285 EMERGENCY DEPT VISIT HI MDM: CPT

## 2024-06-20 PROCEDURE — 93010 ELECTROCARDIOGRAM REPORT: CPT

## 2024-06-20 PROCEDURE — 70450 CT HEAD/BRAIN W/O DYE: CPT | Performed by: EMERGENCY MEDICINE

## 2024-06-20 PROCEDURE — 82962 GLUCOSE BLOOD TEST: CPT

## 2024-06-20 PROCEDURE — 80053 COMPREHEN METABOLIC PANEL: CPT | Performed by: EMERGENCY MEDICINE

## 2024-06-20 PROCEDURE — 84484 ASSAY OF TROPONIN QUANT: CPT | Performed by: EMERGENCY MEDICINE

## 2024-06-20 PROCEDURE — 71045 X-RAY EXAM CHEST 1 VIEW: CPT | Performed by: EMERGENCY MEDICINE

## 2024-06-20 PROCEDURE — 36415 COLL VENOUS BLD VENIPUNCTURE: CPT

## 2024-06-20 PROCEDURE — 99215 OFFICE O/P EST HI 40 MIN: CPT | Performed by: NURSE PRACTITIONER

## 2024-06-20 PROCEDURE — 85025 COMPLETE CBC W/AUTO DIFF WBC: CPT | Performed by: EMERGENCY MEDICINE

## 2024-06-20 PROCEDURE — 93005 ELECTROCARDIOGRAM TRACING: CPT

## 2024-06-20 RX ORDER — PROCHLORPERAZINE EDISYLATE 5 MG/ML
10 INJECTION INTRAMUSCULAR; INTRAVENOUS ONCE
Status: DISCONTINUED | OUTPATIENT
Start: 2024-06-20 | End: 2024-06-20

## 2024-06-20 RX ORDER — ACETAMINOPHEN 500 MG
1000 TABLET ORAL ONCE
Status: DISCONTINUED | OUTPATIENT
Start: 2024-06-20 | End: 2024-06-20

## 2024-06-20 RX ORDER — DIPHENHYDRAMINE HYDROCHLORIDE 50 MG/ML
25 INJECTION INTRAMUSCULAR; INTRAVENOUS ONCE
Status: DISCONTINUED | OUTPATIENT
Start: 2024-06-20 | End: 2024-06-20

## 2024-06-20 NOTE — ED INITIAL ASSESSMENT (HPI)
Using  #[id number]  C/o left sided temporal pain since Saturday. Patient reports having a fall on Saturday and has had a headache and intermittent double vision since. Unsure of LOC. + daily baby aspirin. Denies nausea/vomiting

## 2024-06-20 NOTE — ED QUICK NOTES
Pt states her head does not hurt enough for medications. Pt refusing medications- ok with Xray and CT.

## 2024-06-20 NOTE — ED PROVIDER NOTES
Patient Seen in: [facility]      History     Chief Complaint   Patient presents with    Fall     Stated Complaint: Headache fall    Subjective:   Patient is a 73-year-old female with hypertension, osteoporosis, vitamin D deficiency, who presents today for a left temporal headache, fall on Saturday, and weakness since the fall.  She also reports right elbow and right hip pain.  She reports she has had a left temporal headache ongoing for about 1 week, however 2 days into the headache, was in bed, stood up, and fell.  She is unsure whether she lost consciousness or not, and is unsure how long she was on the floor, when she came to, had trouble getting back into bed.  She reports since then she has had  a generalized sense of weakness and anxiety.  She has had no chest pain or shortness of breath.  She reports this has never happened to her before.  She denies any headache history.  She is not on blood thinning medications.  She has not tried any pain alleviating medications for the headache, but reports for the past week has had a constant 4 out of 10, sometimes worse, headache on the left side temple.  Denies any numbness, tingling or weakness Patient has no MI, CVA, or TIA history.      Objective:   History reviewed. No pertinent past medical history.           History reviewed. No pertinent surgical history.             Social History     Socioeconomic History    Marital status:    Tobacco Use    Smoking status: Every Day     Current packs/day: 0.50     Average packs/day: 0.5 packs/day for 57.0 years (28.5 ttl pk-yrs)     Types: Cigarettes     Start date: 6/26/1967    Smokeless tobacco: Never   Vaping Use    Vaping status: Never Used   Substance and Sexual Activity    Alcohol use: No    Drug use: No    Sexual activity: Not Currently     Social Determinants of Health      Received from [facility]              Review of Systems   All other systems reviewed and are  negative.      Positive for stated complaint: Headache fall  Other systems are as noted in HPI.  Constitutional and vital signs reviewed.      All other systems reviewed and negative except as noted above.    Physical Exam     ED Triage Vitals [06/20/24 1056]   BP (!) 162/69   Pulse 66   Resp 16   Temp 97.4 °F (36.3 °C)   Temp src Temporal   SpO2 98 %   O2 Device None (Room air)       Current Vitals:   Vital Signs  BP: (!) 162/69  Pulse: 66  Resp: 16  Temp: 97.4 °F (36.3 °C)  Temp src: Temporal    Oxygen Therapy  SpO2: 98 %  O2 Device: None (Room air)            Physical Exam  Constitutional:       Appearance: Normal appearance.   HENT:      Head: Normocephalic and atraumatic.      Right Ear: Tympanic membrane, ear canal and external ear normal.      Left Ear: Tympanic membrane, ear canal and external ear normal.   Eyes:      Extraocular Movements: Extraocular movements intact.      Pupils: Pupils are equal, round, and reactive to light.   Cardiovascular:      Rate and Rhythm: Normal rate and regular rhythm.      Pulses: Normal pulses.      Heart sounds: Normal heart sounds.   Pulmonary:      Effort: Pulmonary effort is normal.      Breath sounds: Normal breath sounds.   Musculoskeletal:      Right elbow: Normal. No swelling, deformity or effusion. Normal range of motion. No tenderness.      Cervical back: Full passive range of motion without pain, normal range of motion and neck supple.      Right hip: No deformity, lacerations, tenderness, bony tenderness or crepitus. Normal range of motion. Normal strength.      Right upper leg: No swelling, edema, deformity, tenderness or bony tenderness.        Legs:    Neurological:      General: No focal deficit present.      Mental Status: She is oriented to person, place, and time. Mental status is at baseline.      Cranial Nerves: No cranial nerve deficit.      Sensory: No sensory deficit.      Motor: No weakness.      Coordination: Coordination normal.      Gait: Gait  normal.   Psychiatric:         Mood and Affect: Mood normal.         Behavior: Behavior normal.         Thought Content: Thought content normal.         ED Course   Labs Reviewed - No data to display      MDM      Medical Decision Making  Differentials considered: [place] bleed versus temporal arteritis versus syncope versus other    Unclear etiology of patient's fall.  She does not recall the event fully, complaining of a left temporal headache, that has not resolved in over a week.  Advised ER for further evaluation.  Patient will go to the [facility], with a neighbor, neighbor will drive.  Patient agreeable.    Case discussed with Dr. Katie Johnson     used for the duration of this visit        Disposition and Plan     Clinical Impression:  1. Left temporal headache    2. Weakness    3. Right elbow pain    4. Right hip pain         Disposition:  Ic to ed  6/20/2024 11:30 am    Follow-up:  No follow-up provider specified.        Medications Prescribed:  Discharge Medication List as of 6/20/2024 11:27 AM

## 2024-06-20 NOTE — ED INITIAL ASSESSMENT (HPI)
Pt presents to the IC with c/o a fall on Saturday night then having vision changes. Pt notes pain to the left temple and right elbow. Unknown LOC but reports difficulty getting back to bed afterwards. Unknown reason for the fall. Denies n/v but states she doesn't feel well.

## 2024-06-24 ENCOUNTER — OFFICE VISIT (OUTPATIENT)
Dept: INTERNAL MEDICINE CLINIC | Facility: CLINIC | Age: 74
End: 2024-06-24

## 2024-06-24 VITALS
DIASTOLIC BLOOD PRESSURE: 70 MMHG | SYSTOLIC BLOOD PRESSURE: 130 MMHG | WEIGHT: 137 LBS | OXYGEN SATURATION: 96 % | HEIGHT: 62 IN | HEART RATE: 65 BPM | BODY MASS INDEX: 25.21 KG/M2

## 2024-06-24 DIAGNOSIS — D32.9 MENINGIOMA (HCC): ICD-10-CM

## 2024-06-24 DIAGNOSIS — R51.9 HEADACHE ABOVE THE EYE REGION: Primary | ICD-10-CM

## 2024-06-24 PROCEDURE — 99214 OFFICE O/P EST MOD 30 MIN: CPT | Performed by: INTERNAL MEDICINE

## 2024-06-24 RX ORDER — LEVOTHYROXINE SODIUM 88 UG/1
88 TABLET ORAL
Qty: 90 TABLET | Refills: 3 | Status: SHIPPED | OUTPATIENT
Start: 2024-06-24

## 2024-06-24 RX ORDER — TRAMADOL HYDROCHLORIDE 50 MG/1
50 TABLET ORAL EVERY 6 HOURS PRN
Qty: 30 TABLET | Refills: 0 | Status: SHIPPED | OUTPATIENT
Start: 2024-06-24

## 2024-06-24 RX ORDER — ERGOCALCIFEROL 1.25 MG/1
50000 CAPSULE ORAL WEEKLY
Qty: 12 CAPSULE | Refills: 3 | Status: SHIPPED | OUTPATIENT
Start: 2024-06-24

## 2024-06-24 RX ORDER — ALBUTEROL SULFATE 90 UG/1
2 AEROSOL, METERED RESPIRATORY (INHALATION) EVERY 4 HOURS PRN
Qty: 18 G | Refills: 3 | Status: SHIPPED | OUTPATIENT
Start: 2024-06-24

## 2024-06-24 RX ORDER — ALENDRONATE SODIUM 70 MG/1
TABLET ORAL
Qty: 12 TABLET | Refills: 3 | Status: SHIPPED | OUTPATIENT
Start: 2024-06-24

## 2024-06-24 RX ORDER — SIMVASTATIN 10 MG
10 TABLET ORAL NIGHTLY
Qty: 90 TABLET | Refills: 3 | Status: SHIPPED | OUTPATIENT
Start: 2024-06-24

## 2024-06-24 NOTE — PROGRESS NOTES
Subjective:   Dorys Pickens is a 73 year old female who presents for Double Vision and Headache     Patient here for a fu from ED where she presented with left temporal HA .  Had a ct scan showed a small hemangioma which was calcified.  Continues with low grade ha and no neuro symptoms.  History/Other:    Chief Complaint Reviewed and Verified  No Further Nursing Notes to   Review  Medications Reviewed  Problem List Reviewed         Tobacco:  Social History     Tobacco Use   Smoking Status Every Day    Current packs/day: 0.50    Average packs/day: 0.5 packs/day for 57.0 years (28.5 ttl pk-yrs)    Types: Cigarettes    Start date: 6/26/1967   Smokeless Tobacco Never     E-Cigarettes/Vaping       Questions Responses    E-Cigarette Use Never User           Tobacco cessation counseling for 3-10 minutes (add E/M code #76489).      Current Outpatient Medications   Medication Sig Dispense Refill    traMADol 50 MG Oral Tab Take 1 tablet (50 mg total) by mouth every 6 (six) hours as needed for Pain. 30 tablet 0    albuterol 108 (90 Base) MCG/ACT Inhalation Aero Soln Inhale 2 puffs into the lungs every 4 (four) hours as needed for Wheezing. 18 g 3    alendronate 70 MG Oral Tab TAKE 1 TABLET BY MOUTH EVERY WEEK 12 tablet 3    diclofenac 50 MG Oral Tab EC Take 1 tablet (50 mg total) by mouth 2 (two) times daily. 180 tablet 3    ergocalciferol 1.25 MG (77801 UT) Oral Cap Take 1 capsule (50,000 Units total) by mouth once a week. 12 capsule 3    levothyroxine 88 MCG Oral Tab Take 1 tablet (88 mcg total) by mouth before breakfast. 90 tablet 3    simvastatin 10 MG Oral Tab Take 1 tablet (10 mg total) by mouth nightly. 90 tablet 3    zolpidem 10 MG Oral Tab Take 1 tablet (10 mg total) by mouth nightly as needed for Sleep. 30 tablet 3    predniSONE 10 MG Oral Tab Take 1 tablet (10 mg total) by mouth 2 (two) times daily. 20 tablet 0    levocetirizine 5 MG Oral Tab Take 1 tablet (5 mg total) by mouth every evening. 30 tablet 0     fluconazole 100 MG Oral Tab Take 1 tablet (100 mg total) by mouth daily. 3 tablet 0    propranolol 20 MG Oral Tab Take 1 tablet (20 mg total) by mouth 2 (two) times daily. 60 tablet 11    aspirin (ASPIRIN LOW DOSE) 81 MG Oral Tab EC Take 1 tablet (81 mg total) by mouth daily. 30 tablet 11    traZODone 100 MG Oral Tab Take 1 tablet (100 mg total) by mouth nightly. 90 tablet 3    pantoprazole 40 MG Oral Tab EC Take 1 tablet (40 mg total) by mouth every morning before breakfast. 90 tablet 3    HYDROcodone-acetaminophen (NORCO) 5-325 MG Oral Tab Take 1 tablet by mouth every 6 (six) hours as needed for Pain. 40 tablet 0    gabapentin 100 MG Oral Cap Take 1 capsule (100 mg total) by mouth nightly. 30 capsule 3    FLUTICASONE PROPIONATE 50 MCG/ACT Nasal Suspension SHAKE LIQUID AND INSTILL 2 SPRAYS IN EACH NOSTRIL EVERY DAY 16 g 0    LORazepam 0.5 MG Oral Tab Take 1 tablet (0.5 mg total) by mouth every 6 (six) hours as needed for Anxiety. 30 tablet 3    nystatin-triamcinolone 341974-0.1 UNIT/GM-% External Cream Topically bid 1 Tube 3    furosemide 40 MG Oral Tab Take 1 tablet (40 mg total) by mouth daily. 30 tablet 1    Doxycycline Hyclate 100 MG Oral Cap Take 1 capsule (100 mg total) by mouth 2 (two) times daily. 20 capsule 0    Albuterol Sulfate HFA (VENTOLIN HFA) 108 (90 Base) MCG/ACT Inhalation Aero Soln Inhale 2 puffs into the lungs every 4 (four) hours as needed for Wheezing. 1 Inhaler 6    ibuprofen 600 MG Oral Tab Take 1 tablet (600 mg total) by mouth every 6 (six) hours as needed for Pain. 60 tablet 1    Cyclobenzaprine HCl 10 MG Oral Tab Take 1 tablet (10 mg total) by mouth 3 (three) times daily as needed for Muscle spasms. 30 tablet 0    traMADol HCl 50 MG Oral Tab Take 1 tablet (50 mg total) by mouth every 6 (six) hours as needed for Pain. 40 tablet 1    Diclofenac Sodium (VOLTAREN) 1 % Transdermal Gel Apply 2 g topically 4 (four) times daily. 5 Tube 11    triamcinolone acetonide 0.1 % External Cream Apply  topically 2 (two) times daily as needed. 60 g 3    FUNGOID TINCTURE 2 % External Solution APPLY AA D  2    ibuprofen 400 MG Oral Tab Take 1 tablet (400 mg total) by mouth every 6 (six) hours as needed for Pain. 60 tablet 11    clotrimazole-betamethasone 1-0.05 % External Cream Topically bid 60 g 3         Review of Systems:  Review of Systems   Neurological:  Positive for headaches. Negative for speech difficulty, weakness, light-headedness and numbness.         Objective:   /70   Pulse 65   Ht 5' 2\" (1.575 m)   Wt 137 lb (62.1 kg)   SpO2 96%   BMI 25.06 kg/m²  Estimated body mass index is 25.06 kg/m² as calculated from the following:    Height as of this encounter: 5' 2\" (1.575 m).    Weight as of this encounter: 137 lb (62.1 kg).  Physical Exam  Constitutional:       Appearance: She is normal weight.   HENT:      Head: Normocephalic and atraumatic.   Cardiovascular:      Rate and Rhythm: Normal rate and regular rhythm.      Heart sounds: No murmur heard.  Pulmonary:      Effort: Pulmonary effort is normal.      Breath sounds: Normal breath sounds.   Abdominal:      Palpations: Abdomen is soft.   Neurological:      General: No focal deficit present.      Mental Status: She is alert and oriented to person, place, and time.      Cranial Nerves: No cranial nerve deficit.      Motor: No weakness.      Deep Tendon Reflexes: Reflexes normal.   Psychiatric:         Thought Content: Thought content normal.           Assessment & Plan:   1. Headache above the eye region (Primary)  Tramadol 50 mg prn  -     traMADol HCl; Take 1 tablet (50 mg total) by mouth every 6 (six) hours as needed for Pain.  Dispense: 30 tablet; Refill: 0  2. Meningioma (HCC) check mri for further assessment  -     MRI BRAIN (CPT=70551); Future; Expected date: 06/24/2024  Other orders  -     Albuterol Sulfate HFA; Inhale 2 puffs into the lungs every 4 (four) hours as needed for Wheezing.  Dispense: 18 g; Refill: 3  -     Alendronate Sodium;  TAKE 1 TABLET BY MOUTH EVERY WEEK  Dispense: 12 tablet; Refill: 3  -     Diclofenac Sodium; Take 1 tablet (50 mg total) by mouth 2 (two) times daily.  Dispense: 180 tablet; Refill: 3  -     Vitamin D (Ergocalciferol); Take 1 capsule (50,000 Units total) by mouth once a week.  Dispense: 12 capsule; Refill: 3  -     Levothyroxine Sodium; Take 1 tablet (88 mcg total) by mouth before breakfast.  Dispense: 90 tablet; Refill: 3  -     Simvastatin; Take 1 tablet (10 mg total) by mouth nightly.  Dispense: 90 tablet; Refill: 3        No follow-ups on file.    Cristel Arias MD, 6/24/2024, 11:15 AM

## 2024-07-22 RX ORDER — TRAZODONE HYDROCHLORIDE 100 MG/1
100 TABLET ORAL NIGHTLY
Qty: 90 TABLET | Refills: 3 | Status: SHIPPED | OUTPATIENT
Start: 2024-07-22

## 2024-07-22 NOTE — TELEPHONE ENCOUNTER
A refill request was received for:  Requested Prescriptions     Pending Prescriptions Disp Refills    TRAZODONE 100 MG Oral Tab [Pharmacy Med Name: TRAZODONE 100MG TABLETS] 90 tablet 3     Sig: TAKE 1 TABLET(100 MG) BY MOUTH EVERY NIGHT     Last refill date:  9/26/23    Last office visit: 6/24/24      Future Appointments   Date Time Provider Department Center   8/23/2024  2:30 PM [facility] RM1 (1.5T WIDE) [facility] [place]

## 2024-08-19 ENCOUNTER — TELEPHONE (OUTPATIENT)
Dept: CASE MANAGEMENT | Age: 74
End: 2024-08-19

## 2024-08-19 NOTE — TELEPHONE ENCOUNTER
Mri Head        Status: DENIED        Reference number [id number]     A copy of the denial letter is filed under the MEDIA tab, reference for complete details. You may reach out to Angelina at [phone] to discuss decision.     Please reach out to patient with plan of care.      Thank you

## 2024-08-21 NOTE — TELEPHONE ENCOUNTER
Received a call from Oliva Hart [phone] , from the Authorization team in regards to the MRI order, which was denied and will be will be cancelled if Dr Arias does not do a peer to peer for the MRI.

## 2024-09-03 ENCOUNTER — HOSPITAL ENCOUNTER (OUTPATIENT)
Age: 74
Discharge: HOME | End: 2024-09-03
Payer: MEDICAID

## 2024-09-03 VITALS
RESPIRATION RATE: 20 BRPM | SYSTOLIC BLOOD PRESSURE: 149 MMHG | DIASTOLIC BLOOD PRESSURE: 74 MMHG | OXYGEN SATURATION: 98 % | TEMPERATURE: 97 F | HEART RATE: 73 BPM

## 2024-09-03 DIAGNOSIS — U07.1 COVID-19: Primary | ICD-10-CM

## 2024-09-03 LAB — SARS-COV-2 RNA RESP QL NAA+PROBE: DETECTED

## 2024-09-03 PROCEDURE — 99213 OFFICE O/P EST LOW 20 MIN: CPT | Performed by: NURSE PRACTITIONER

## 2024-09-03 PROCEDURE — U0002 COVID-19 LAB TEST NON-CDC: HCPCS

## 2024-09-03 RX ORDER — FLUTICASONE PROPIONATE 50 MCG
2 SPRAY, SUSPENSION (ML) NASAL DAILY
Qty: 16 G | Refills: 0 | Status: SHIPPED | OUTPATIENT
Start: 2024-09-03

## 2024-09-03 RX ORDER — BENZONATATE 200 MG/1
200 CAPSULE ORAL 3 TIMES DAILY PRN
Qty: 21 CAPSULE | Refills: 0 | Status: SHIPPED | OUTPATIENT
Start: 2024-09-03

## 2024-09-03 NOTE — DISCHARGE INSTRUCTIONS
Take the benzonatate as needed for the cough start the Flonase nasal spray to help with runny nose congestion.  Take Tylenol for any pain or discomfort drink plenty of fluids warm tea with honey eat as tolerated.  If you develop high fever chest pain shortness of breath vomiting diarrhea cannot keep down oral hydration or any new or worsening symptoms go to the nearest emergency department otherwise you can follow-up with your primary care provider in a week.  Recommend that you wear your mask for another 6 days as you could be still shedding the virus and could pass it on to others.    W razie potrzeby za?yj benzonatat na kaszel. Rozpocznij stosowanie sprayu do nosa Flonase, jm z?agodzi? mine pappas.  Na ból lub dyskomfort nale?y przyjmowa? Tylenol, pi? du?o p?ynów, ciep?? herbat? z miodem, je?? w miar? tolerancji.  Je?li wyst?pi wysoka gor?czka, ból w klatce piersiowej, duszno??, wymioty, biegunka, która rachna b?dzie w stanie powstrzyma? nawodnienia jayne ustnej lub pojawi? si? nowe lub nasilaj?ce si? objawy, udaj si? na najbli?szy oddzia? ratunkowy, w przeciwnym razie mo?esz zg?osi? si? do alexandra burroughs w ci?gu tygodnia.  Zalecamy noszenie maski przez rosendoejne 6 dni, poniewa? mo?esz grzegorz roznosi? wirusa i przekazywa? go innym.

## 2024-09-03 NOTE — ED PROVIDER NOTES
Patient Seen in: [facility]      History     Chief Complaint   Patient presents with    Cough     Stated Complaint: Cough,Chest Congested    Subjective:   HPI    This is a 73-year-old Polish speaking female with history of hyperlipidemia and hypothyroidism presenting with cough and congestion.  [employer]ia [employer] Polish  patient states for 4 days she has had cough and congestion.  Patient states she lives alone so no sick contacts.  No chest pain shortness of breath fever nausea vomiting diarrhea.    Objective:   No pertinent past medical history.            No pertinent past surgical history.              No pertinent social history.            Review of Systems    Positive for stated Chief Complaint: Cough    Other systems are as noted in HPI.  Constitutional and vital signs reviewed.      All other systems reviewed and negative except as noted above.    Physical Exam     ED Triage Vitals [09/03/24 1128]   /74   Pulse 73   Resp 20   Temp 97.1 °F (36.2 °C)   Temp src Temporal   SpO2 98 %   O2 Device None (Room air)       Current Vitals:   Vital Signs  BP: 149/74  Pulse: 73  Resp: 20  Temp: 97.1 °F (36.2 °C)  Temp src: Temporal    Oxygen Therapy  SpO2: 98 %  O2 Device: None (Room air)            Physical Exam  Vitals and nursing note reviewed.   Constitutional:       Appearance: Normal appearance.   HENT:      Right Ear: Tympanic membrane normal.      Left Ear: Tympanic membrane normal.      Nose: Congestion present. No rhinorrhea.      Mouth/Throat:      Mouth: Mucous membranes are moist.      Pharynx: Oropharynx is clear. No posterior oropharyngeal erythema.   Eyes:      Conjunctiva/sclera: Conjunctivae normal.   Cardiovascular:      Rate and Rhythm: Normal rate.   Pulmonary:      Effort: Pulmonary effort is normal. No respiratory distress.      Breath sounds: Normal breath sounds. No wheezing.      Comments: + cough  Musculoskeletal:         General: Normal range of motion.       Cervical back: Normal range of motion. No rigidity or tenderness.   Lymphadenopathy:      Cervical: No cervical adenopathy.   Neurological:      Mental Status: She is alert and oriented to person, place, and time.               ED Course     Labs Reviewed   RAPID SARS-COV-2 BY PCR - Abnormal; Notable for the following components:       Result Value    Rapid SARS-CoV-2 by PCR Detected (*)     All other components within normal limits            MDM                      Medical Decision Making  73-year-old female nontoxic-appearing presenting with cough and congestion for 4 days.  DDx COVID versus URI versus another respiratory or viral illness low suspicion of pneumonia given lungs are clear no hypoxia and afebrile no clinical indication for labs or imaging she will be swabbed for COVID.    COVID-positive via [employer] Polish  discussed results with the patient discussed COVID is a virus discussed wearing the mask for an additional 6 days if she could be still shedding the virus will prescribe benzonatate for the cough and Flonase nasal spray for congestion.  Discussed outpatient follow-up with PCP and strict ER precautions.  Patient acknowledged understanding results and discharge instructions.    Problems Addressed:  COVID-19: acute illness or injury    Amount and/or Complexity of Data Reviewed  Labs: ordered. Decision-making details documented in ED Course.    Risk  OTC drugs.  Prescription drug management.        Disposition and Plan     Clinical Impression:  1. COVID-19         Disposition:  Discharge  9/3/2024 11:52 am    Follow-up:  Cristel Arias MD  [address] [place] [id number]  [phone]    In 1 week            Medications Prescribed:  Discharge Medication List as of 9/3/2024 11:53 AM        START taking these medications    Details   benzonatate 200 MG Oral Cap Take 1 capsule (200 mg total) by mouth 3 (three) times daily as needed for cough., Normal, Disp-21  capsule, R-0      !! fluticasone propionate 50 MCG/ACT Nasal Suspension 2 sprays by Nasal route daily., Normal, Disp-16 g, R-0       !! - Potential duplicate medications found. Please discuss with provider.

## 2024-10-29 ENCOUNTER — HOSPITAL ENCOUNTER (INPATIENT)
Facility: HOSPITAL | Age: 74
LOS: 16 days | Discharge: HOME | End: 2024-11-14
Attending: STUDENT IN AN ORGANIZED HEALTH CARE EDUCATION/TRAINING PROGRAM | Admitting: HOSPITALIST
Payer: MEDICAID

## 2024-10-29 ENCOUNTER — APPOINTMENT (OUTPATIENT)
Dept: GENERAL RADIOLOGY | Facility: HOSPITAL | Age: 74
End: 2024-10-29
Payer: MEDICAID

## 2024-10-29 ENCOUNTER — APPOINTMENT (OUTPATIENT)
Dept: CT IMAGING | Facility: HOSPITAL | Age: 74
End: 2024-10-29
Attending: STUDENT IN AN ORGANIZED HEALTH CARE EDUCATION/TRAINING PROGRAM
Payer: MEDICAID

## 2024-10-29 ENCOUNTER — HOSPITAL ENCOUNTER (OUTPATIENT)
Age: 74
Discharge: ANOTHER HOSPITAL | End: 2024-10-29
Payer: MEDICAID

## 2024-10-29 VITALS
DIASTOLIC BLOOD PRESSURE: 64 MMHG | OXYGEN SATURATION: 96 % | TEMPERATURE: 98 F | RESPIRATION RATE: 18 BRPM | SYSTOLIC BLOOD PRESSURE: 125 MMHG | HEART RATE: 84 BPM

## 2024-10-29 DIAGNOSIS — R10.13 EPIGASTRIC PAIN: Primary | ICD-10-CM

## 2024-10-29 DIAGNOSIS — K26.5 DUODENAL ULCER WITH PERFORATION (HCC): Primary | ICD-10-CM

## 2024-10-29 DIAGNOSIS — R11.2 NAUSEA AND VOMITING IN ADULT: ICD-10-CM

## 2024-10-29 PROBLEM — K57.00 PERFORATED DIVERTICULUM OF DUODENUM: Status: ACTIVE | Noted: 2024-10-29

## 2024-10-29 LAB
ALBUMIN SERPL-MCNC: 4.4 G/DL (ref 3.2–4.8)
ALBUMIN/GLOB SERPL: 1.8 {RATIO} (ref 1–2)
ALP LIVER SERPL-CCNC: 72 U/L
ALT SERPL-CCNC: 8 U/L
ANION GAP SERPL CALC-SCNC: 7 MMOL/L (ref 0–18)
AST SERPL-CCNC: 11 U/L (ref ?–34)
ATRIAL RATE: 77 BPM
BASOPHILS # BLD AUTO: 0.03 X10(3) UL (ref 0–0.2)
BASOPHILS NFR BLD AUTO: 0.2 %
BILIRUB SERPL-MCNC: 0.8 MG/DL (ref 0.2–1.1)
BUN BLD-MCNC: 13 MG/DL (ref 9–23)
BUN/CREAT SERPL: 17.6 (ref 10–20)
CALCIUM BLD-MCNC: 9.7 MG/DL (ref 8.7–10.4)
CHLORIDE SERPL-SCNC: 102 MMOL/L (ref 98–112)
CO2 SERPL-SCNC: 25 MMOL/L (ref 21–32)
CREAT BLD-MCNC: 0.74 MG/DL
DEPRECATED RDW RBC AUTO: 41.2 FL (ref 35.1–46.3)
EGFRCR SERPLBLD CKD-EPI 2021: 85 ML/MIN/1.73M2 (ref 60–?)
EOSINOPHIL # BLD AUTO: 0.02 X10(3) UL (ref 0–0.7)
EOSINOPHIL NFR BLD AUTO: 0.1 %
ERYTHROCYTE [DISTWIDTH] IN BLOOD BY AUTOMATED COUNT: 12.5 % (ref 11–15)
GLOBULIN PLAS-MCNC: 2.4 G/DL (ref 2–3.5)
GLUCOSE BLD-MCNC: 110 MG/DL (ref 70–99)
HCT VFR BLD AUTO: 41.4 %
HGB BLD-MCNC: 14.4 G/DL
IMM GRANULOCYTES # BLD AUTO: 0.07 X10(3) UL (ref 0–1)
IMM GRANULOCYTES NFR BLD: 0.5 %
LACTATE SERPL-SCNC: 0.9 MMOL/L (ref 0.5–2)
LIPASE SERPL-CCNC: 29 U/L (ref 12–53)
LYMPHOCYTES # BLD AUTO: 1.23 X10(3) UL (ref 1–4)
LYMPHOCYTES NFR BLD AUTO: 8.6 %
MCH RBC QN AUTO: 31.5 PG (ref 26–34)
MCHC RBC AUTO-ENTMCNC: 34.8 G/DL (ref 31–37)
MCV RBC AUTO: 90.6 FL
MONOCYTES # BLD AUTO: 0.6 X10(3) UL (ref 0.1–1)
MONOCYTES NFR BLD AUTO: 4.2 %
NEUTROPHILS # BLD AUTO: 12.43 X10 (3) UL (ref 1.5–7.7)
NEUTROPHILS # BLD AUTO: 12.43 X10(3) UL (ref 1.5–7.7)
NEUTROPHILS NFR BLD AUTO: 86.4 %
OSMOLALITY SERPL CALC.SUM OF ELEC: 279 MOSM/KG (ref 275–295)
P AXIS: 67 DEGREES
P-R INTERVAL: 156 MS
PLATELET # BLD AUTO: 218 10(3)UL (ref 150–450)
POTASSIUM SERPL-SCNC: 4.3 MMOL/L (ref 3.5–5.1)
PROT SERPL-MCNC: 6.8 G/DL (ref 5.7–8.2)
Q-T INTERVAL: 378 MS
QRS DURATION: 98 MS
QTC CALCULATION (BEZET): 427 MS
R AXIS: -59 DEGREES
RBC # BLD AUTO: 4.57 X10(6)UL
SODIUM SERPL-SCNC: 134 MMOL/L (ref 136–145)
T AXIS: 47 DEGREES
TROPONIN I SERPL HS-MCNC: 3 NG/L
VENTRICULAR RATE: 77 BPM
WBC # BLD AUTO: 14.4 X10(3) UL (ref 4–11)

## 2024-10-29 PROCEDURE — 71045 X-RAY EXAM CHEST 1 VIEW: CPT

## 2024-10-29 PROCEDURE — 99223 1ST HOSP IP/OBS HIGH 75: CPT | Performed by: HOSPITALIST

## 2024-10-29 PROCEDURE — 74177 CT ABD & PELVIS W/CONTRAST: CPT | Performed by: STUDENT IN AN ORGANIZED HEALTH CARE EDUCATION/TRAINING PROGRAM

## 2024-10-29 PROCEDURE — 99215 OFFICE O/P EST HI 40 MIN: CPT | Performed by: PHYSICIAN ASSISTANT

## 2024-10-29 RX ORDER — FAMOTIDINE 10 MG/ML
20 INJECTION, SOLUTION INTRAVENOUS ONCE
Status: COMPLETED | OUTPATIENT
Start: 2024-10-29 | End: 2024-10-29

## 2024-10-29 RX ORDER — METRONIDAZOLE 500 MG/100ML
500 INJECTION, SOLUTION INTRAVENOUS EVERY 12 HOURS
Status: DISCONTINUED | OUTPATIENT
Start: 2024-10-30 | End: 2024-11-12

## 2024-10-29 RX ORDER — MORPHINE SULFATE 2 MG/ML
1 INJECTION, SOLUTION INTRAMUSCULAR; INTRAVENOUS EVERY 2 HOUR PRN
Status: DISCONTINUED | OUTPATIENT
Start: 2024-10-29 | End: 2024-11-14

## 2024-10-29 RX ORDER — MORPHINE SULFATE 2 MG/ML
2 INJECTION, SOLUTION INTRAMUSCULAR; INTRAVENOUS EVERY 2 HOUR PRN
Status: DISCONTINUED | OUTPATIENT
Start: 2024-10-29 | End: 2024-11-14

## 2024-10-29 RX ORDER — ALBUTEROL SULFATE 90 UG/1
2 INHALANT RESPIRATORY (INHALATION) EVERY 4 HOURS PRN
Status: DISCONTINUED | OUTPATIENT
Start: 2024-10-29 | End: 2024-11-14

## 2024-10-29 RX ORDER — MORPHINE SULFATE 4 MG/ML
4 INJECTION, SOLUTION INTRAMUSCULAR; INTRAVENOUS EVERY 2 HOUR PRN
Status: DISCONTINUED | OUTPATIENT
Start: 2024-10-29 | End: 2024-11-14

## 2024-10-29 RX ORDER — NICOTINE 21 MG/24HR
1 PATCH, TRANSDERMAL 24 HOURS TRANSDERMAL DAILY
Status: DISCONTINUED | OUTPATIENT
Start: 2024-10-29 | End: 2024-11-14

## 2024-10-29 RX ORDER — ONDANSETRON 2 MG/ML
4 INJECTION INTRAMUSCULAR; INTRAVENOUS EVERY 6 HOURS PRN
Status: DISCONTINUED | OUTPATIENT
Start: 2024-10-29 | End: 2024-11-14

## 2024-10-29 RX ORDER — ONDANSETRON 2 MG/ML
4 INJECTION INTRAMUSCULAR; INTRAVENOUS ONCE
Status: COMPLETED | OUTPATIENT
Start: 2024-10-29 | End: 2024-10-29

## 2024-10-29 RX ORDER — DEXTROSE MONOHYDRATE, SODIUM CHLORIDE, AND POTASSIUM CHLORIDE 50; 1.49; 4.5 G/1000ML; G/1000ML; G/1000ML
INJECTION, SOLUTION INTRAVENOUS CONTINUOUS
Status: DISCONTINUED | OUTPATIENT
Start: 2024-10-29 | End: 2024-11-02 | Stop reason: ALTCHOICE

## 2024-10-29 RX ORDER — METOCLOPRAMIDE HYDROCHLORIDE 5 MG/ML
5 INJECTION INTRAMUSCULAR; INTRAVENOUS EVERY 8 HOURS PRN
Status: DISCONTINUED | OUTPATIENT
Start: 2024-10-29 | End: 2024-11-12

## 2024-10-29 RX ORDER — MORPHINE SULFATE 4 MG/ML
4 INJECTION, SOLUTION INTRAMUSCULAR; INTRAVENOUS ONCE
Status: COMPLETED | OUTPATIENT
Start: 2024-10-29 | End: 2024-10-29

## 2024-10-29 RX ORDER — METRONIDAZOLE 500 MG/100ML
500 INJECTION, SOLUTION INTRAVENOUS ONCE
Status: COMPLETED | OUTPATIENT
Start: 2024-10-29 | End: 2024-10-29

## 2024-10-29 NOTE — ED INITIAL ASSESSMENT (HPI)
Pt sent from [facility] for epigastric pain since Saturday night. +nausea, vomiting. Diarrhea x 1 on Saturday night.

## 2024-10-29 NOTE — ED PROVIDER NOTES
Patient Seen in: [facility]      History     Chief Complaint   Patient presents with    Abdominal Pain     Stated Complaint: ABDOMIN PAIN    Subjective:   HPI    Patient is a 73-year-old female with hyperlipidemia, hypothyroidism, Polish speaking, Polish speaking  services used, presenting to immediate care for evaluation of epigastric pain for 3 days.  Associated: Decreased oral intake/appetite, and nausea with nonbilious, nonbloody emesis.  Initially severe and constant.  No intermittent.  Took pain medication from daughter (unknown) for symptoms with minimal improvement.  Coming to immediate care for further evaluation and ongoing symptoms.  No fevers or chills or myalgias.  No current nausea or vomiting.  No back or flank pain no urinary symptoms.  No blood in stools.  No history of abdominal surgeries.  Denies prior episodes.  Denies frequent NSAID use or alcohol use    Objective:     Past Medical History:    Hyperlipidemia              History reviewed. No pertinent surgical history.             Social History     Socioeconomic History    Marital status:    Tobacco Use    Smoking status: Every Day     Current packs/day: 0.50     Average packs/day: 0.5 packs/day for 57.3 years (28.7 ttl pk-yrs)     Types: Cigarettes     Start date: 6/26/1967    Smokeless tobacco: Never   Vaping Use    Vaping status: Never Used   Substance and Sexual Activity    Alcohol use: No    Drug use: No    Sexual activity: Not Currently     Social Drivers of Health      [facility]              Review of Systems   Constitutional:  Negative for chills and fever.   Gastrointestinal:  Positive for abdominal pain, nausea and vomiting. Negative for blood in stool, constipation and diarrhea.   Genitourinary:  Negative for difficulty urinating, dysuria, flank pain, hematuria and urgency.   Musculoskeletal:  Negative for back pain.   Allergic/Immunologic: Negative for  immunocompromised state.   Neurological:  Negative for dizziness, weakness, light-headedness and headaches.   Psychiatric/Behavioral:  Negative for confusion.    All other systems reviewed and are negative.      Positive for stated complaint: ABDOMIN PAIN  Other systems are as noted in HPI.  Constitutional and vital signs reviewed.      All other systems reviewed and negative except as noted above.    Physical Exam     ED Triage Vitals [10/29/24 1104]   /64   Pulse 84   Resp 18   Temp 97.9 °F (36.6 °C)   Temp src Temporal   SpO2 96 %   O2 Device None (Room air)       Current Vitals:   Vital Signs  BP: 125/64  Pulse: 84  Resp: 18  Temp: 97.9 °F (36.6 °C)  Temp src: Temporal    Oxygen Therapy  SpO2: 96 %  O2 Device: None (Room air)        Physical Exam  Vitals and nursing note reviewed.   Constitutional:       General: She is not in acute distress.     Appearance: She is well-developed. She is not ill-appearing, toxic-appearing or diaphoretic.   HENT:      Head: Normocephalic and atraumatic.      Mouth/Throat:      Mouth: Mucous membranes are moist.   Eyes:      General: No scleral icterus.  Cardiovascular:      Rate and Rhythm: Normal rate and regular rhythm.   Pulmonary:      Effort: Pulmonary effort is normal. No respiratory distress.   Abdominal:      Tenderness: There is abdominal tenderness.      Comments: Tenderness to palpation epigastric region with guarding present.  No rebound tenderness.  Soft abdomen.  No flank or CVA tenderness.   Musculoskeletal:         General: No tenderness or deformity. Normal range of motion.      Cervical back: Normal range of motion. No rigidity.   Skin:     Findings: No rash.   Neurological:      General: No focal deficit present.      Mental Status: She is alert and oriented to person, place, and time.      Motor: No weakness.      Gait: Gait normal.   Psychiatric:         Mood and Affect: Mood normal.         Behavior: Behavior normal.           ED Course   Labs Reviewed  - No data to display       MDM       Patient is a 73-year-old female, presenting to immediate care for evaluation of acute epigastric abdominal pain with associated nausea and nonbilious emesis with decreased appetite.  Onset: 3 days.  Patient focally tender to palpation epigastric region on exam with guarding present.  Negative Sam signs.  No flank or CVA tenderness.  No peritoneal signs.  Soft abdomen.  Discussed limitation immediate care evaluation.  Patient requires higher acuity of care including abdominal screening labs, LFTs, lipase, Bernice, etc.  Will go to [facility] for evaluation of symptoms.  Case discussed with supervising physician Dr. Morales agrees to management plan      Medical Decision Making      Disposition and Plan     Clinical Impression:  1. Epigastric pain    2. Nausea and vomiting in adult         Disposition:  Ic to ed  10/29/2024 11:21 am    Follow-up:  No follow-up provider specified.        Medications Prescribed:  Discharge Medication List as of 10/29/2024 11:22 AM              Supplementary Documentation:

## 2024-10-29 NOTE — ED QUICK NOTES
Spoke with daughter over speaker phone to help translate blood cultures to be taken prior to IV abx along with protonix

## 2024-10-29 NOTE — ED QUICK NOTES
Orders for admission, patient is aware of plan and ready to go upstairs. Any questions, please call ED RN Sherita at extension [phone].     Patient Covid vaccination status: Unvaccinated     COVID Test Ordered in ED: None    COVID Suspicion at Admission: N/A    Running Infusions:  None    Mental Status/LOC at time of transport: Aox4, Polish speaking    Other pertinent information:   CIWA score: N/A   NIH score:  N/A

## 2024-10-29 NOTE — ED PROVIDER NOTES
[place] Emergency Department Note  Patient: Dorys Pickens Age: 73 year old Sex: female      MRN: [id number]  : 1950    Patient Seen in: [facility] Emergency Department    History     Chief Complaint   Patient presents with    Epigastric Pain     Stated Complaint:     History obtained from: patient using assistance of Polish  #[id number]    Patient is a 73-year-old female with a past medical history of hyperlipidemia presenting today for evaluation of epigastric abdominal pain.  She states that over the past 4 days, she has been having pain in her epigastrium.  Associated vomiting and diarrhea which is since improved over the past 2 days.  She states that she has not been able to tolerate oral intake over the past 2 days.  She denies any fevers or chills.  Denies any urinary symptoms.  She does endorse occasional cough but otherwise no shortness of breath or chest pain.  History of prior cholecystectomy      Review of Systems:  Review of Systems  Positive for stated complaint: . Constitutional and vital signs reviewed. All other systems reviewed and negative except as noted above.    Patient History:  Past Medical History:    Hyperlipidemia       History reviewed. No pertinent surgical history.     History reviewed. No pertinent family history.    Specific Social Determinants of Health:   Social History     Socioeconomic History    Marital status:    Tobacco Use    Smoking status: Every Day     Current packs/day: 0.50     Average packs/day: 0.5 packs/day for 57.3 years (28.7 ttl pk-yrs)     Types: Cigarettes     Start date: 1967    Smokeless tobacco: Never   Vaping Use    Vaping status: Never Used   Substance and Sexual Activity    Alcohol use: No    Drug use: No    Sexual activity: Not Currently     Social Drivers of Health      [facility] and agreed except as otherwise stated in  HPI.    Physical Exam     ED Triage Vitals [10/29/24 1238]   /81   Pulse 81   Resp 19   Temp 97.7 °F (36.5 °C)   Temp src Oral   SpO2 97 %   O2 Device None (Room air)       Current:/68   Pulse 77   Temp 97.7 °F (36.5 °C) (Oral)   Resp 15   Ht 156 cm (5' 1.42\")   Wt 63.5 kg   SpO2 98%   BMI 26.09 kg/m²         Physical Exam  Constitutional:       General: She is not in acute distress.  HENT:      Head: Normocephalic and atraumatic.      Mouth/Throat:      Mouth: Mucous membranes are moist.   Eyes:      Extraocular Movements: Extraocular movements intact.   Cardiovascular:      Rate and Rhythm: Normal rate and regular rhythm.      Heart sounds: Normal heart sounds.   Pulmonary:      Effort: Pulmonary effort is normal. No respiratory distress.      Breath sounds: Wheezing present.   Abdominal:      Palpations: Abdomen is soft.      Tenderness: There is no abdominal tenderness.      Comments: Epigastric, right upper and left upper quadrant abdominal tenderness, no guarding or rebound   Skin:     General: Skin is warm and dry.      Capillary Refill: Capillary refill takes less than 2 seconds.      Findings: No rash.   Neurological:      General: No focal deficit present.      Mental Status: She is alert and oriented to person, place, and time.   Psychiatric:         Mood and Affect: Mood normal.         Behavior: Behavior normal.         ED Course   Labs:   Labs Reviewed   COMP METABOLIC PANEL (14) - Abnormal; Notable for the following components:       Result Value    Glucose 110 (*)     Sodium 134 (*)     ALT 8 (*)     All other components within normal limits   CBC WITH DIFFERENTIAL WITH PLATELET - Abnormal; Notable for the following components:    WBC 14.4 (*)     Neutrophil Absolute Prelim 12.43 (*)     Neutrophil Absolute 12.43 (*)     All other components within normal limits   TROPONIN I HIGH SENSITIVITY - Normal   LIPASE - Normal   LACTIC ACID, PLASMA   RAINBOW DRAW BLUE   RAINBOW DRAW GOLD    BLOOD CULTURE   BLOOD CULTURE     Radiology findings:  I personally reviewed the images.   CT ABDOMEN+PELVIS(CONTRAST ONLY)(CPT=74177)    Result Date: 10/29/2024  CONCLUSION:  1. Contained perforation 2nd portion of duodenum either related to perforated diverticulitis or ulcer.  Small para duodenal fluid collection measuring approximately 3 x 3.6 x 1.6 cm. 2. Multiple duodenal diverticula. 3. Moderate coronary artery calcification.  4. Findings were called to Dr. Lara.    Dictated by (CST): Dustin Hughes MD on 10/29/2024 at 3:46 PM     Finalized by (CST): Dustin Hughes MD on 10/29/2024 at 4:00 PM          XR CHEST AP PORTABLE  (CPT=71045)    Result Date: 10/29/2024  CONCLUSION:   Small bibasilar opacities, which may reflect atelectasis with or without superimposed pneumonia.     Dictated by (CST): Lalo King MD on 10/29/2024 at 1:33 PM     Finalized by (CST): Lalo King MD on 10/29/2024 at 1:35 PM           EKG as interpreted by me: Ventricular rate 77, normal sinus rhythm, left axis deviation, no ND interval prolongation, narrow QRS, QTc 427 ms, no ST segment elevations or depressions, isolated T wave inversions in aVL  Cardiac Monitor: Interpreted by me.   Pulse Readings from Last 1 Encounters:   10/29/24 77   , sinus,     External non-ED records reviewed independently by me: PCP note from 1/18/2024 reviewed confirming patient has past medical history of prior pneumonia in January 2024 and eustachian tube dysfunction     MDM   73-year-old female with a past medical history of hyperlipidemia presenting for evaluation of epigastric abdominal pain, nausea of, vomiting and diarrhea over the past 4 days.  Upon arrival to the emergency department, her vitals are within normal limits.  She has reproducible epigastric and right upper and left upper quadrant abdominal tenderness with no rebound or guarding.    Differential diagnoses considered includes, but is not limited to: Gastritis, pancreatitis,  gastroenteritis, colitis, pneumonia    Will obtain the following tests: CBC, CMP, lipase, troponin, chest x-ray, CT and pelvis with IV contrast, EKG  Please see ED course for my independent review of these tests/imaging results.    Initial Medications/Therapeutics administered: Zofran, morphine, Pepcid    Chronic conditions affecting care: Hyperlipidemia    ED Course as of 10/29/24 1657  ------------------------------------------------------------  Time: 10/29 1655  Comment: Labs with leukocytosis of 14.4.  I independently reviewed the chest x-ray images that show no evidence of lobar pneumonia.  Suspect chest x-ray findings more consistent with atelectasis.  CT abdomen pelvis with evidence of a contained perforation of the second portion of the duodenum concerning for underlying diverticulitis versus ulcer.  I discussed patient's case with the [place] hospitalist, Dr. Tavarez, who accepted the patient for admission.  Started on broad-spectrum antibiotics.  I discussed patient's case with Dr. Sanches, gastroenterology, and Dr. Alvarez, general surgery, who were both made aware of new consult  ------------------------------------------------------------  Time: 10/29 1656  Comment: Lactate and blood cultures sent however patient has no vital sign abnormalities concerning for sepsis.  Does not fit SIRS criteria at this time.        Disposition and Plan     Clinical Impression:  1. Duodenal ulcer with perforation (HCC)        Disposition:  Admit    Follow-up:  No follow-up provider specified.    Medications Prescribed:  Current Discharge Medication List          Hospital Problems       Present on Admission  Date Reviewed: 6/24/2024            ICD-10-CM Noted POA    * (Principal) Perforated diverticulum of duodenum K57.00 10/29/2024 Unknown        This note may have been created using voice dictation technology and may include inadvertent errors.      Radha Lara MD  Emergency Medicine

## 2024-10-29 NOTE — ED QUICK NOTES
Trouble getting a Polish  on LL and Registration had the same issue. Pt called her daughter and she was able to translate POC; medication, IV start and CT of abdomen to be done. All questions answered.

## 2024-10-29 NOTE — ED INITIAL ASSESSMENT (HPI)
Pt c/o epigastric pain beginning Saturday night.   Pain all day on Saturday, today is intermittent. Emesis on Sunday. Denies fevers.

## 2024-10-30 LAB
ANION GAP SERPL CALC-SCNC: 7 MMOL/L (ref 0–18)
BASOPHILS # BLD AUTO: 0.04 X10(3) UL (ref 0–0.2)
BASOPHILS NFR BLD AUTO: 0.6 %
BUN BLD-MCNC: 9 MG/DL (ref 9–23)
BUN/CREAT SERPL: 15.3 (ref 10–20)
CALCIUM BLD-MCNC: 8.3 MG/DL (ref 8.7–10.4)
CHLORIDE SERPL-SCNC: 107 MMOL/L (ref 98–112)
CO2 SERPL-SCNC: 25 MMOL/L (ref 21–32)
CREAT BLD-MCNC: 0.59 MG/DL
DEPRECATED RDW RBC AUTO: 41.9 FL (ref 35.1–46.3)
EGFRCR SERPLBLD CKD-EPI 2021: 95 ML/MIN/1.73M2 (ref 60–?)
EOSINOPHIL # BLD AUTO: 0.1 X10(3) UL (ref 0–0.7)
EOSINOPHIL NFR BLD AUTO: 1.5 %
ERYTHROCYTE [DISTWIDTH] IN BLOOD BY AUTOMATED COUNT: 12.4 % (ref 11–15)
GLUCOSE BLD-MCNC: 98 MG/DL (ref 70–99)
HCT VFR BLD AUTO: 37 %
HGB BLD-MCNC: 12.7 G/DL
IMM GRANULOCYTES # BLD AUTO: 0.04 X10(3) UL (ref 0–1)
IMM GRANULOCYTES NFR BLD: 0.6 %
LYMPHOCYTES # BLD AUTO: 0.89 X10(3) UL (ref 1–4)
LYMPHOCYTES NFR BLD AUTO: 13.2 %
MCH RBC QN AUTO: 31.5 PG (ref 26–34)
MCHC RBC AUTO-ENTMCNC: 34.3 G/DL (ref 31–37)
MCV RBC AUTO: 91.8 FL
MONOCYTES # BLD AUTO: 0.53 X10(3) UL (ref 0.1–1)
MONOCYTES NFR BLD AUTO: 7.9 %
NEUTROPHILS # BLD AUTO: 5.14 X10 (3) UL (ref 1.5–7.7)
NEUTROPHILS # BLD AUTO: 5.14 X10(3) UL (ref 1.5–7.7)
NEUTROPHILS NFR BLD AUTO: 76.2 %
OSMOLALITY SERPL CALC.SUM OF ELEC: 287 MOSM/KG (ref 275–295)
PLATELET # BLD AUTO: 190 10(3)UL (ref 150–450)
POTASSIUM SERPL-SCNC: 3.5 MMOL/L (ref 3.5–5.1)
RBC # BLD AUTO: 4.03 X10(6)UL
SODIUM SERPL-SCNC: 139 MMOL/L (ref 136–145)
WBC # BLD AUTO: 6.7 X10(3) UL (ref 4–11)

## 2024-10-30 PROCEDURE — 99233 SBSQ HOSP IP/OBS HIGH 50: CPT | Performed by: INTERNAL MEDICINE

## 2024-10-30 PROCEDURE — 99223 1ST HOSP IP/OBS HIGH 75: CPT | Performed by: INTERNAL MEDICINE

## 2024-10-30 NOTE — H&P
[facility]    PATIENT'S NAME: GLENN SETH   ATTENDING PHYSICIAN: Arpit Hutchins MD   PATIENT ACCOUNT#:   [id number]    LOCATION:  [address] [place] [address]  MEDICAL RECORD #:   [id number]       YOB: 1950  ADMISSION DATE:       10/29/2024    HISTORY AND PHYSICAL EXAMINATION    CHIEF COMPLAINT:  Perforated duodenal ulcer.    HISTORY OF PRESENT ILLNESS:  Patient is a 73-year-old  female who presented to the emergency department for evaluation after she was seen at the urgent care center for epigastric abdominal pain earlier today.  Because of her persistent symptoms she was sent to the emergency department for evaluation.  CBC showed white blood cell count of 14.4 with left shift.  Lipase and troponin were negative.  Chemistry was unremarkable.  Lactic acid and blood cultures were obtained.  Chest x-ray showed no acute findings.  CT scan of the abdomen showed contained perforation of the second portion of duodenum either related to perforated diverticulitis or ulcer, small area of paraduodenal fluid collection measuring 3 x 3.6 x 1.6 cm.  Patient was started on IV Rocephin and Flagyl, IV Protonix, and she will be admitted to the hospital for further management.     PAST MEDICAL HISTORY:  Gastroesophageal reflux disease, chronic obstructive pulmonary disease, hyperlipidemia, osteoarthritis, history of vertebral compression fractures.    PAST SURGICAL HISTORY:  Remote cholecystectomy.    MEDICATIONS:  Please see medication reconciliation list.  Again, she uses ibuprofen almost on a daily basis for arthritic pain.    ALLERGIES:  She had questionable allergy to penicillin and sulfa.    FAMILY HISTORY:  Positive for hypertension.    SOCIAL HISTORY:  Chronic tobacco use.  No alcohol or drug use.  Lives with her family.  Independent for basic activities of daily living.      REVIEW OF SYSTEMS:  Patient reports chronic back pain.  She has been taking ibuprofen on a daily basis.  Records also  indicate prednisone use, but patient is not able to verify this information.  In the last 2 to 3 days, she has been having epigastric discomfort radiating to the back associated with nausea and poor appetite.  No fever or chills.  Other 12-point review of systems is negative.       PHYSICAL EXAMINATION:    GENERAL:  Alert and oriented to time, place and person.  Moderate distress.   VITAL SIGNS:  Temperature 97.7, pulse 77, respiratory rate 15, blood pressure 118/68, pulse ox 98% on room air.   HEENT:  Atraumatic.  Oropharynx clear.  Dry mucous membranes.  Normal hard and soft palate.  Eyes:  Anicteric sclerae.    NECK:  Supple.  No lymphadenopathy.  Trachea midline.  Full range of motion.   LUNGS:  Clear to auscultation bilaterally.  Normal respiratory effort.    HEART:  Regular rate and rhythm.  S1 and S2 auscultated.  No murmur.    ABDOMEN:  Soft, nondistended.  Discomfort to palpation, epigastric area.  No guarding or rebound tenderness.   EXTREMITIES:  No peripheral edema, clubbing or cyanosis.   NEUROLOGIC:  Motor and sensory intact.      ASSESSMENT:  Perforated second portion of duodenum, suspect perforated peptic ulcer disease, NSAID related, with paraduodenal fluid collection and possible abscess.      PLAN:  Patient will be admitted to general medical floor.  N.p.o.  IV Protonix.  IV fluids.  IV antibiotics.  Pain control.  Monitor temperature curve.  Monitor hemodynamic status.  Gastroenterology and general surgery consults.  Further recommendations to follow.     Dictated By Lucia Tavarez MD  d: 10/29/2024 17:09:20  t: 10/29/2024 19:35:28  Job [id number]  FB/    cc: Arpit Hutchins MD

## 2024-10-30 NOTE — CONSULTS
[facility]   Gastroenterology Consultation Note    Dorys Pickens  Patient Status:    Inpatient  Date of Admission:         10/29/2024, Hospital day #1  Attending:   Arpit Hutchins MD  PCP:     Cristel Arias MD    Reason for Consultation:  Abdominal pain    History of Present Illness:  Dorys Pickens is a a(n) 73 year old female w/ a history of HLD, +smoker, who presents with acute epigastric pain x 3 days. States it progressed and became severe associated with one episode of emesis and thus presented to the ER. Denies fever or chills. No diarrhea. No melena or hematochezia. Takes occasional ibuprofen for back pain. No prior EGD. Denies heartburn or dysphagia.   Patient seen this morning states pain is significantly improved this morning.     History:  Past Medical History:    Hyperlipidemia     Past Surgical History:   Procedure Laterality Date    Cholecystectomy       History reviewed. No pertinent family history.   reports that she has been smoking cigarettes. She started smoking about 57 years ago. She has a 28.7 pack-year smoking history. She has never used smokeless tobacco. She reports that she does not drink alcohol and does not use drugs.    Allergies:  Allergies[1]    Medications:    Current Facility-Administered Medications:     pantoprazole (Protonix) 40 mg in sodium chloride 0.9% PF 10 mL IV push, 40 mg, Intravenous, Q12H    potassium chloride 20 mEq in dextrose 5%-sodium chloride 0.45% 1000mL infusion premix, , Intravenous, Continuous    morphINE PF 2 MG/ML injection 1 mg, 1 mg, Intravenous, Q2H PRN **OR** morphINE PF 2 MG/ML injection 2 mg, 2 mg, Intravenous, Q2H PRN **OR** morphINE PF 4 MG/ML injection 4 mg, 4 mg, Intravenous, Q2H PRN    ondansetron (Zofran) 4 MG/2ML injection 4 mg, 4 mg, Intravenous, Q6H PRN    metoclopramide (Reglan) 5 mg/mL injection 5 mg, 5 mg, Intravenous, Q8H PRN    cefTRIAXone (Rocephin) 2 g in sodium chloride 0.9% 100 mL IVPB-ADDV, 2 g, Intravenous, Q24H     metRONIDAZOLE in sodium chloride 0.79% (Flagyl) 5 mg/mL IVPB premix 500 mg, 500 mg, Intravenous, Q12H    albuterol (Ventolin HFA) 108 (90 Base) MCG/ACT inhaler 2 puff, 2 puff, Inhalation, Q4H PRN    nicotine (Nicoderm CQ) 21 MG/24HR patch 1 patch, 1 patch, Transdermal, Daily    Facility-Administered Medications Ordered in Other Encounters:     Tobramycin Sulfate (TOBREX) 0.3 % ophthalmic ointment, , Both Eyes, QID  Prescriptions Prior to Admission[2]    Review of Systems:  CONSTITUTIONAL:  negative for fevers, rigors  EYES:  negative for diplopia   RESPIRATORY:  negative for severe shortness of breath  CARDIOVASCULAR:  negative for crushing sub-sternal chest pain  GASTROINTESTINAL:  see HPI  GENITOURINARY:  negative for dysuria or gross hematuria  INTEGUMENT/BREAST:  SKIN:  negative for jaundice   ALLERGIC/IMMUNOLOGIC:  negative for hay fever  ENDOCRINE:  negative for cold intolerance and heat intolerance  MUSCULOSKELETAL:  negative for joint effusion/severe erythema  NEURO: negative for dizziness or loss of conscious or paresthesias  BEHAVIOR/PSYCH:  negative for psychotic behavior    Physical Exam:    Blood pressure 119/66, pulse 70, temperature 97.7 °F (36.5 °C), temperature source Oral, resp. rate 18, height 5' 1.42\" (1.56 m), weight 135 lb 14.4 oz (61.6 kg), SpO2 95%, not currently breastfeeding. Body mass index is 25.33 kg/m².    General: awake, alert and oriented, no acute distress  HEENT: moist mucus membranes  PULM: no conversational dyspnea  CARDIOVASCULAR: regular rate and rhythm, the extremities are warm and well perfused  GI: soft, mild epigastric tenderness, non-distended, + BS, no rebound/guarding   EXTREMITIES: no edema, moving all extremities  SKIN: no visible rash  NEURO: appropriate and interactive    Laboratory Data:  Lab Results   Component Value Date    WBC 6.7 10/30/2024    HGB 12.7 10/30/2024    HCT 37.0 10/30/2024    .0 10/30/2024    CREATSERUM 0.59 10/30/2024    BUN 9 10/30/2024      10/30/2024    K 3.5 10/30/2024     10/30/2024    CO2 25.0 10/30/2024    GLU 98 10/30/2024    CA 8.3 10/30/2024    ALB 4.4 10/29/2024    ALKPHO 72 10/29/2024    BILT 0.8 10/29/2024    TP 6.8 10/29/2024    AST 11 10/29/2024    ALT 8 10/29/2024    LIP 29 10/29/2024       Imaging:  CT ABDOMEN+PELVIS(CONTRAST ONLY)(CPT=74177)    Result Date: 10/29/2024  CONCLUSION:  1. Contained perforation 2nd portion of duodenum either related to perforated diverticulitis or ulcer.  Small para duodenal fluid collection measuring approximately 3 x 3.6 x 1.6 cm. 2. Multiple duodenal diverticula. 3. Moderate coronary artery calcification.  4. Findings were called to Dr. Lara.    Dictated by (CST): Dustin Hughes MD on 10/29/2024 at 3:46 PM     Finalized by (CST): Dustin Hughes MD on 10/29/2024 at 4:00 PM          XR CHEST AP PORTABLE  (CPT=71045)    Result Date: 10/29/2024  CONCLUSION:   Small bibasilar opacities, which may reflect atelectasis with or without superimposed pneumonia.     Dictated by (CST): Lalo King MD on 10/29/2024 at 1:33 PM     Finalized by (CST): Lalo King MD on 10/29/2024 at 1:35 PM           Assessment & Plan   Dorys Pickens is a a(n) 73 year old female w/ a history of HLD, +smoker, who presents with acute epigastric pain x 3 days. WBC 14 on admission. CT a/p shows a contained perforation in D2, possible complicated diverticulitis vs perforated peptic ulcer disease. Started on IV abx and appears to be clinically responding to medical management. Benign abdominal exam today.      Recommend:  - continue NPO except small ice chips  - continue PPI BID and IV antibiotics  - plan UGI with gastrograffin tomorrow to assess luminal integrity  - no plans for immediate EGD at this time    Jazz Sanches MD  [facility] Gastroenterology  10/30/2024    This note was partially prepared using Dragon Medical voice recognition dictation software. As a result, errors may occur. When identified, these errors  have been corrected. While every attempt is made to correct errors during dictation, discrepancies may still exist.          [1]   Allergies  Allergen Reactions    Mushrooms ANAPHYLAXIS    Pcn [Penicillins] SHORTNESS OF BREATH     Pt tolerated ceftriaxone in the ED on 10/29/24    Amoxicillin     Sulfa Antibiotics    [2]   Medications Prior to Admission   Medication Sig Dispense Refill Last Dose/Taking    albuterol 108 (90 Base) MCG/ACT Inhalation Aero Soln Inhale 2 puffs into the lungs every 4 (four) hours as needed for Wheezing. 18 g 3 Past Week    alendronate 70 MG Oral Tab TAKE 1 TABLET BY MOUTH EVERY WEEK (Patient taking differently: 1 tablet (70 mg total). TAKE 1 TABLET BY MOUTH EVERY WEEK) 12 tablet 3 8/29/2024    ergocalciferol 1.25 MG (60636 UT) Oral Cap Take 1 capsule (50,000 Units total) by mouth once a week. 12 capsule 3 8/27/2024    levothyroxine 88 MCG Oral Tab Take 1 tablet (88 mcg total) by mouth before breakfast. 90 tablet 3 10/29/2024 Morning    simvastatin 10 MG Oral Tab Take 1 tablet (10 mg total) by mouth nightly. 90 tablet 3 10/28/2024    zolpidem 10 MG Oral Tab Take 1 tablet (10 mg total) by mouth nightly as needed for Sleep. 30 tablet 3 10/28/2024    propranolol 20 MG Oral Tab Take 1 tablet (20 mg total) by mouth 2 (two) times daily. 60 tablet 11 10/29/2024    aspirin (ASPIRIN LOW DOSE) 81 MG Oral Tab EC Take 1 tablet (81 mg total) by mouth daily. 30 tablet 11 10/29/2024    benzonatate 200 MG Oral Cap Take 1 capsule (200 mg total) by mouth 3 (three) times daily as needed for cough. 21 capsule 0     fluticasone propionate 50 MCG/ACT Nasal Suspension 2 sprays by Nasal route daily. 16 g 0     TRAZODONE 100 MG Oral Tab TAKE 1 TABLET(100 MG) BY MOUTH EVERY NIGHT 90 tablet 3     traMADol 50 MG Oral Tab Take 1 tablet (50 mg total) by mouth every 6 (six) hours as needed for Pain. 30 tablet 0     diclofenac 50 MG Oral Tab EC Take 1 tablet (50 mg total) by mouth 2 (two) times daily. 180 tablet 3      predniSONE 10 MG Oral Tab Take 1 tablet (10 mg total) by mouth 2 (two) times daily. 20 tablet 0     levocetirizine 5 MG Oral Tab Take 1 tablet (5 mg total) by mouth every evening. 30 tablet 0     fluconazole 100 MG Oral Tab Take 1 tablet (100 mg total) by mouth daily. 3 tablet 0     pantoprazole 40 MG Oral Tab EC Take 1 tablet (40 mg total) by mouth every morning before breakfast. 90 tablet 3     HYDROcodone-acetaminophen (NORCO) 5-325 MG Oral Tab Take 1 tablet by mouth every 6 (six) hours as needed for Pain. 40 tablet 0     gabapentin 100 MG Oral Cap Take 1 capsule (100 mg total) by mouth nightly. 30 capsule 3     FLUTICASONE PROPIONATE 50 MCG/ACT Nasal Suspension SHAKE LIQUID AND INSTILL 2 SPRAYS IN EACH NOSTRIL EVERY DAY 16 g 0     LORazepam 0.5 MG Oral Tab Take 1 tablet (0.5 mg total) by mouth every 6 (six) hours as needed for Anxiety. 30 tablet 3     nystatin-triamcinolone 994721-4.1 UNIT/GM-% External Cream Topically bid 1 Tube 3     furosemide 40 MG Oral Tab Take 1 tablet (40 mg total) by mouth daily. 30 tablet 1     Doxycycline Hyclate 100 MG Oral Cap Take 1 capsule (100 mg total) by mouth 2 (two) times daily. 20 capsule 0     Albuterol Sulfate HFA (VENTOLIN HFA) 108 (90 Base) MCG/ACT Inhalation Aero Soln Inhale 2 puffs into the lungs every 4 (four) hours as needed for Wheezing. 1 Inhaler 6     ibuprofen 600 MG Oral Tab Take 1 tablet (600 mg total) by mouth every 6 (six) hours as needed for Pain. 60 tablet 1     Cyclobenzaprine HCl 10 MG Oral Tab Take 1 tablet (10 mg total) by mouth 3 (three) times daily as needed for Muscle spasms. 30 tablet 0     traMADol HCl 50 MG Oral Tab Take 1 tablet (50 mg total) by mouth every 6 (six) hours as needed for Pain. 40 tablet 1     Diclofenac Sodium (VOLTAREN) 1 % Transdermal Gel Apply 2 g topically 4 (four) times daily. 5 Tube 11     triamcinolone acetonide 0.1 % External Cream Apply topically 2 (two) times daily as needed. 60 g 3     FUNGOID TINCTURE 2 % External  Solution APPLY AA D  2     ibuprofen 400 MG Oral Tab Take 1 tablet (400 mg total) by mouth every 6 (six) hours as needed for Pain. 60 tablet 11     clotrimazole-betamethasone 1-0.05 % External Cream Topically bid 60 g 3

## 2024-10-30 NOTE — PLAN OF CARE
Patient alert x4. Denies pain or nausea. Currently strict NPO. Voiding freely. No BM over night. IVF and IV abx continued. Safety precautions in place. Call light within reach.    Problem: Patient Centered Care  Goal: Patient preferences are identified and integrated in the patient's plan of care  Description: Interventions:  - What would you like us to know as we care for you?   - Provide timely, complete, and accurate information to patient/family  - Incorporate patient and family knowledge, values, beliefs, and cultural backgrounds into the planning and delivery of care  - Encourage patient/family to participate in care and decision-making at the level they choose  - Honor patient and family perspectives and choices  Outcome: Progressing

## 2024-10-30 NOTE — PLAN OF CARE
Patient alert, oriented, up walking in hallway, continue NPO can have ice chips, no c/o pain, continue IV fluids and ABX, pt aware of plan of care, encouraged patient to call for assistance if needed, pt verbalizes understanding.  Problem: Patient Centered Care  Goal: Patient preferences are identified and integrated in the patient's plan of care  Description: Interventions:  - What would you like us to know as we care for you? Pt lives by herself  - Provide timely, complete, and accurate information to patient/family  - Incorporate patient and family knowledge, values, beliefs, and cultural backgrounds into the planning and delivery of care  - Encourage patient/family to participate in care and decision-making at the level they choose  - Honor patient and family perspectives and choices  Outcome: Progressing     Problem: Patient/Family Goals  Goal: Patient/Family Long Term Goal  Description: Patient's Long Term Goal: return home     Interventions:  - continue IV ABXas ordered  Surgery and GI on consult  - See additional Care Plan goals for specific interventions  Outcome: Progressing  Goal: Patient/Family Short Term Goal  Description: Patient's Short Term Goal: pain well controlled /resolved    Interventions:   - asses for pain,   -medicate as needed  -bowel rest  - See additional Care Plan goals for specific interventions  Outcome: Progressing

## 2024-10-30 NOTE — H&P (VIEW-ONLY)
[facility]   Gastroenterology Consultation Note    Dorys Pickens  Patient Status:    Inpatient  Date of Admission:         10/29/2024, Hospital day #1  Attending:   Arpit Hutchins MD  PCP:     Cristel Arias MD    Reason for Consultation:  Abdominal pain    History of Present Illness:  Dorys Pickens is a a(n) 73 year old female w/ a history of HLD, +smoker, who presents with acute epigastric pain x 3 days. States it progressed and became severe associated with one episode of emesis and thus presented to the ER. Denies fever or chills. No diarrhea. No melena or hematochezia. Takes occasional ibuprofen for back pain. No prior EGD. Denies heartburn or dysphagia.   Patient seen this morning states pain is significantly improved this morning.     History:  Past Medical History:    Hyperlipidemia     Past Surgical History:   Procedure Laterality Date    Cholecystectomy       History reviewed. No pertinent family history.   reports that she has been smoking cigarettes. She started smoking about 57 years ago. She has a 28.7 pack-year smoking history. She has never used smokeless tobacco. She reports that she does not drink alcohol and does not use drugs.    Allergies:  Allergies[1]    Medications:    Current Facility-Administered Medications:     pantoprazole (Protonix) 40 mg in sodium chloride 0.9% PF 10 mL IV push, 40 mg, Intravenous, Q12H    potassium chloride 20 mEq in dextrose 5%-sodium chloride 0.45% 1000mL infusion premix, , Intravenous, Continuous    morphINE PF 2 MG/ML injection 1 mg, 1 mg, Intravenous, Q2H PRN **OR** morphINE PF 2 MG/ML injection 2 mg, 2 mg, Intravenous, Q2H PRN **OR** morphINE PF 4 MG/ML injection 4 mg, 4 mg, Intravenous, Q2H PRN    ondansetron (Zofran) 4 MG/2ML injection 4 mg, 4 mg, Intravenous, Q6H PRN    metoclopramide (Reglan) 5 mg/mL injection 5 mg, 5 mg, Intravenous, Q8H PRN    cefTRIAXone (Rocephin) 2 g in sodium chloride 0.9% 100 mL IVPB-ADDV, 2 g, Intravenous, Q24H     metRONIDAZOLE in sodium chloride 0.79% (Flagyl) 5 mg/mL IVPB premix 500 mg, 500 mg, Intravenous, Q12H    albuterol (Ventolin HFA) 108 (90 Base) MCG/ACT inhaler 2 puff, 2 puff, Inhalation, Q4H PRN    nicotine (Nicoderm CQ) 21 MG/24HR patch 1 patch, 1 patch, Transdermal, Daily    Facility-Administered Medications Ordered in Other Encounters:     Tobramycin Sulfate (TOBREX) 0.3 % ophthalmic ointment, , Both Eyes, QID  Prescriptions Prior to Admission[2]    Review of Systems:  CONSTITUTIONAL:  negative for fevers, rigors  EYES:  negative for diplopia   RESPIRATORY:  negative for severe shortness of breath  CARDIOVASCULAR:  negative for crushing sub-sternal chest pain  GASTROINTESTINAL:  see HPI  GENITOURINARY:  negative for dysuria or gross hematuria  INTEGUMENT/BREAST:  SKIN:  negative for jaundice   ALLERGIC/IMMUNOLOGIC:  negative for hay fever  ENDOCRINE:  negative for cold intolerance and heat intolerance  MUSCULOSKELETAL:  negative for joint effusion/severe erythema  NEURO: negative for dizziness or loss of conscious or paresthesias  BEHAVIOR/PSYCH:  negative for psychotic behavior    Physical Exam:    Blood pressure 119/66, pulse 70, temperature 97.7 °F (36.5 °C), temperature source Oral, resp. rate 18, height 5' 1.42\" (1.56 m), weight 135 lb 14.4 oz (61.6 kg), SpO2 95%, not currently breastfeeding. Body mass index is 25.33 kg/m².    General: awake, alert and oriented, no acute distress  HEENT: moist mucus membranes  PULM: no conversational dyspnea  CARDIOVASCULAR: regular rate and rhythm, the extremities are warm and well perfused  GI: soft, mild epigastric tenderness, non-distended, + BS, no rebound/guarding   EXTREMITIES: no edema, moving all extremities  SKIN: no visible rash  NEURO: appropriate and interactive    Laboratory Data:  Lab Results   Component Value Date    WBC 6.7 10/30/2024    HGB 12.7 10/30/2024    HCT 37.0 10/30/2024    .0 10/30/2024    CREATSERUM 0.59 10/30/2024    BUN 9 10/30/2024      10/30/2024    K 3.5 10/30/2024     10/30/2024    CO2 25.0 10/30/2024    GLU 98 10/30/2024    CA 8.3 10/30/2024    ALB 4.4 10/29/2024    ALKPHO 72 10/29/2024    BILT 0.8 10/29/2024    TP 6.8 10/29/2024    AST 11 10/29/2024    ALT 8 10/29/2024    LIP 29 10/29/2024       Imaging:  CT ABDOMEN+PELVIS(CONTRAST ONLY)(CPT=74177)    Result Date: 10/29/2024  CONCLUSION:  1. Contained perforation 2nd portion of duodenum either related to perforated diverticulitis or ulcer.  Small para duodenal fluid collection measuring approximately 3 x 3.6 x 1.6 cm. 2. Multiple duodenal diverticula. 3. Moderate coronary artery calcification.  4. Findings were called to Dr. Lara.    Dictated by (CST): Dustin Hughes MD on 10/29/2024 at 3:46 PM     Finalized by (CST): Dustin Hughes MD on 10/29/2024 at 4:00 PM          XR CHEST AP PORTABLE  (CPT=71045)    Result Date: 10/29/2024  CONCLUSION:   Small bibasilar opacities, which may reflect atelectasis with or without superimposed pneumonia.     Dictated by (CST): Lalo King MD on 10/29/2024 at 1:33 PM     Finalized by (CST): Lalo King MD on 10/29/2024 at 1:35 PM           Assessment & Plan   Dorys Pickens is a a(n) 73 year old female w/ a history of HLD, +smoker, who presents with acute epigastric pain x 3 days. WBC 14 on admission. CT a/p shows a contained perforation in D2, possible complicated diverticulitis vs perforated peptic ulcer disease. Started on IV abx and appears to be clinically responding to medical management. Benign abdominal exam today.      Recommend:  - continue NPO except small ice chips  - continue PPI BID and IV antibiotics  - plan UGI with gastrograffin tomorrow to assess luminal integrity  - no plans for immediate EGD at this time    Jazz Sanches MD  [facility] Gastroenterology  10/30/2024    This note was partially prepared using Dragon Medical voice recognition dictation software. As a result, errors may occur. When identified, these errors  have been corrected. While every attempt is made to correct errors during dictation, discrepancies may still exist.          [1]   Allergies  Allergen Reactions    Mushrooms ANAPHYLAXIS    Pcn [Penicillins] SHORTNESS OF BREATH     Pt tolerated ceftriaxone in the ED on 10/29/24    Amoxicillin     Sulfa Antibiotics    [2]   Medications Prior to Admission   Medication Sig Dispense Refill Last Dose/Taking    albuterol 108 (90 Base) MCG/ACT Inhalation Aero Soln Inhale 2 puffs into the lungs every 4 (four) hours as needed for Wheezing. 18 g 3 Past Week    alendronate 70 MG Oral Tab TAKE 1 TABLET BY MOUTH EVERY WEEK (Patient taking differently: 1 tablet (70 mg total). TAKE 1 TABLET BY MOUTH EVERY WEEK) 12 tablet 3 8/29/2024    ergocalciferol 1.25 MG (08536 UT) Oral Cap Take 1 capsule (50,000 Units total) by mouth once a week. 12 capsule 3 8/27/2024    levothyroxine 88 MCG Oral Tab Take 1 tablet (88 mcg total) by mouth before breakfast. 90 tablet 3 10/29/2024 Morning    simvastatin 10 MG Oral Tab Take 1 tablet (10 mg total) by mouth nightly. 90 tablet 3 10/28/2024    zolpidem 10 MG Oral Tab Take 1 tablet (10 mg total) by mouth nightly as needed for Sleep. 30 tablet 3 10/28/2024    propranolol 20 MG Oral Tab Take 1 tablet (20 mg total) by mouth 2 (two) times daily. 60 tablet 11 10/29/2024    aspirin (ASPIRIN LOW DOSE) 81 MG Oral Tab EC Take 1 tablet (81 mg total) by mouth daily. 30 tablet 11 10/29/2024    benzonatate 200 MG Oral Cap Take 1 capsule (200 mg total) by mouth 3 (three) times daily as needed for cough. 21 capsule 0     fluticasone propionate 50 MCG/ACT Nasal Suspension 2 sprays by Nasal route daily. 16 g 0     TRAZODONE 100 MG Oral Tab TAKE 1 TABLET(100 MG) BY MOUTH EVERY NIGHT 90 tablet 3     traMADol 50 MG Oral Tab Take 1 tablet (50 mg total) by mouth every 6 (six) hours as needed for Pain. 30 tablet 0     diclofenac 50 MG Oral Tab EC Take 1 tablet (50 mg total) by mouth 2 (two) times daily. 180 tablet 3      predniSONE 10 MG Oral Tab Take 1 tablet (10 mg total) by mouth 2 (two) times daily. 20 tablet 0     levocetirizine 5 MG Oral Tab Take 1 tablet (5 mg total) by mouth every evening. 30 tablet 0     fluconazole 100 MG Oral Tab Take 1 tablet (100 mg total) by mouth daily. 3 tablet 0     pantoprazole 40 MG Oral Tab EC Take 1 tablet (40 mg total) by mouth every morning before breakfast. 90 tablet 3     HYDROcodone-acetaminophen (NORCO) 5-325 MG Oral Tab Take 1 tablet by mouth every 6 (six) hours as needed for Pain. 40 tablet 0     gabapentin 100 MG Oral Cap Take 1 capsule (100 mg total) by mouth nightly. 30 capsule 3     FLUTICASONE PROPIONATE 50 MCG/ACT Nasal Suspension SHAKE LIQUID AND INSTILL 2 SPRAYS IN EACH NOSTRIL EVERY DAY 16 g 0     LORazepam 0.5 MG Oral Tab Take 1 tablet (0.5 mg total) by mouth every 6 (six) hours as needed for Anxiety. 30 tablet 3     nystatin-triamcinolone 310551-9.1 UNIT/GM-% External Cream Topically bid 1 Tube 3     furosemide 40 MG Oral Tab Take 1 tablet (40 mg total) by mouth daily. 30 tablet 1     Doxycycline Hyclate 100 MG Oral Cap Take 1 capsule (100 mg total) by mouth 2 (two) times daily. 20 capsule 0     Albuterol Sulfate HFA (VENTOLIN HFA) 108 (90 Base) MCG/ACT Inhalation Aero Soln Inhale 2 puffs into the lungs every 4 (four) hours as needed for Wheezing. 1 Inhaler 6     ibuprofen 600 MG Oral Tab Take 1 tablet (600 mg total) by mouth every 6 (six) hours as needed for Pain. 60 tablet 1     Cyclobenzaprine HCl 10 MG Oral Tab Take 1 tablet (10 mg total) by mouth 3 (three) times daily as needed for Muscle spasms. 30 tablet 0     traMADol HCl 50 MG Oral Tab Take 1 tablet (50 mg total) by mouth every 6 (six) hours as needed for Pain. 40 tablet 1     Diclofenac Sodium (VOLTAREN) 1 % Transdermal Gel Apply 2 g topically 4 (four) times daily. 5 Tube 11     triamcinolone acetonide 0.1 % External Cream Apply topically 2 (two) times daily as needed. 60 g 3     FUNGOID TINCTURE 2 % External  Solution APPLY AA D  2     ibuprofen 400 MG Oral Tab Take 1 tablet (400 mg total) by mouth every 6 (six) hours as needed for Pain. 60 tablet 11     clotrimazole-betamethasone 1-0.05 % External Cream Topically bid 60 g 3

## 2024-10-30 NOTE — PROGRESS NOTES
[facility]  part of [facility]    Progress Note    Dorys Pickens Patient Status:  Inpatient    1950 MRN [id number]   Location [facility] 4W/SW/SE Attending Arpit uHtchins MD   Hosp Day # 1 PCP Cristel Arias MD     Subjective:  Feels ok   hungry    Objective/Physical Exam:  General: Alert, orientated x3.  Cooperative.  No apparent distress.  Vital Signs:  Blood pressure 129/70, pulse 71, temperature 98 °F (36.7 °C), temperature source Oral, resp. rate 16, height 5' 1.42\" (1.56 m), weight 135 lb 14.4 oz (61.6 kg), SpO2 97%, not currently breastfeeding.  Abdomen:  Soft, non-distended, non-tender, with no rebound         Imaging:  CT ABDOMEN+PELVIS(CONTRAST ONLY)(CPT=74177)    Result Date: 10/29/2024  PROCEDURE: CT ABDOMEN + PELVIS (CONTRAST ONLY) (CPT=74177)  COMPARISON: None.  INDICATIONS: Epigastric pain, nausea, and vomiting.  TECHNIQUE: CT images of the abdomen and pelvis were obtained with non-ionic intravenous contrast material.  Automated exposure control for dose reduction was used. Adjustment of the mA and/or kV was done based on the patient's size. Use of iterative reconstruction technique for dose reduction was used.  Dose information is transmitted to the ACR (American College of Radiology) NRDR (National Radiology Data Registry) which includes the Dose Index Registry.  FINDINGS:  LIVER: Too small to characterize hypoattenuating hepatic dome lesion. BILIARY: No evidence for biliary dilatation. Post cholecystectomy. SPLEEN: Normal.  PANCREAS: Normal.  ADRENALS: Normal. KIDNEYS: Normal. AORTA/VASCULAR: Atherosclerotic vascular calcification including coronary artery calcification. No aneurysm or dissection.  LYMPHADENOPATHY: None. GI/MESENTERY: Multiple duodenal diverticula with a contained perforation of the 2nd portion of the duodenum with surrounding inflammatory changes including small para duodenal fluid collections measuring up to 1.6 x 3.6 cm Normal appendix.   Colonic diverticulosis.  No obstruction, bowel wall thickening, or mesenteric mass. ABDOMINAL WALL: Normal.  No mass or hernia.  URINARY BLADDER: Normal. ASCITES:   None. PELVIC ORGANS: No suspect pelvic mass. BONES: Mild-to-moderate chronic superior endplate compression fracture T8 and T10.  Minimal chronic anterior wedging of T9 and T11.  Schmorl's node associated with mild chronic superior endplate compression of L4.  No suspect bone lesion. LUNG BASES: Normal.  No visible pulmonary or pleural disease.  OTHER: Negative.          CONCLUSION:  1. Contained perforation 2nd portion of duodenum either related to perforated diverticulitis or ulcer.  Small para duodenal fluid collection measuring approximately 3 x 3.6 x 1.6 cm. 2. Multiple duodenal diverticula. 3. Moderate coronary artery calcification.  4. Findings were called to Dr. Lara.    Dictated by (CST): Dustin Hughes MD on 10/29/2024 at 3:46 PM     Finalized by (CST): Dustin Hughes MD on 10/29/2024 at 4:00 PM          XR CHEST AP PORTABLE  (CPT=71045)    Result Date: 10/29/2024  PROCEDURE: XR CHEST AP PORTABLE  (CPT=71045) TIME: 13:18.   COMPARISON: [facility], XR CHEST AP PORTABLE (CPT=71045), 6/20/2024, 1:27 PM.  [facility], XR CHEST PA + LAT CHEST (CPT=71046), 1/08/2024, 11:33 AM.  INDICATIONS: Epigastric pain, nausea, and vomitting x4 days. Diarrhea x1 day.  TECHNIQUE:   Single view.   FINDINGS:  CARDIAC/VASC: The cardiomediastinal silhouette is unchanged in size.  There is atherosclerotic calcification of the thoracic aorta. MEDIAST/JENNIFER:   No visible mass or adenopathy. LUNGS/PLEURA: Small bibasilar opacities.  No pleural effusion.  No pneumothorax. BONES: Multilevel degenerative changes of the thoracic spine.  The thoracic compression fracture deformities were better demonstrated the prior radiograph dated 01/08/2024. OTHER: Negative.          CONCLUSION:   Small bibasilar opacities, which may reflect atelectasis with or  without superimposed pneumonia.     Dictated by (CST): Lalo King MD on 10/29/2024 at 1:33 PM     Finalized by (CST): Lalo King MD on 10/29/2024 at 1:35 PM            Assessment/Plan:  Patient Active Problem List   Diagnosis    Osteoporosis    Contusion of right chest wall    Perforated diverticulum of duodenum    Duodenal ulcer with perforation (HCC)    Perforated duodenal ulcer (HCC)   Labs ok    Stable  Will await GI evaluation   Advise contrast study see if leakage but await GI decision if EGD to be performed   Continue NPO for now    DAVID TREVIÑO MD  10/30/2024  9:03 AM

## 2024-10-30 NOTE — PAYOR COMM NOTE
10/29 & 10/30  ADMISSION REVIEW     Payor: [facility]  Subscriber #:  [id number]  Authorization Number: [id number]    Admit date: 10/29/24  Admit time:  5:21 PM       REVIEW DOCUMENTATION:     ED Provider Notes        ED Provider Notes signed by Radha Lara MD at 10/29/2024  4:57 PM       Author: Radha Lara MD Service: -- Author Type: Physician    Filed: 10/29/2024  4:57 PM Date of Service: 10/29/2024  2:14 PM Status: Signed    : Radha Lara MD (Physician)         [place] Emergency Department Note  Patient: Dorys Pickens Age: 73 year old Sex: female      MRN: [id number]  : 1950    Patient Seen in: [facility] Emergency Department    History     Chief Complaint   Patient presents with    Epigastric Pain     Stated Complaint:     History obtained from: patient using assistance of Polish  #[id number]    Patient is a 73-year-old female with a past medical history of hyperlipidemia presenting today for evaluation of epigastric abdominal pain.  She states that over the past 4 days, she has been having pain in her epigastrium.  Associated vomiting and diarrhea which is since improved over the past 2 days.  She states that she has not been able to tolerate oral intake over the past 2 days.  She denies any fevers or chills.  Denies any urinary symptoms.  She does endorse occasional cough but otherwise no shortness of breath or chest pain.  History of prior cholecystectomy      Review of Systems:  Review of Systems  Positive for stated complaint: . Constitutional and vital signs reviewed. All other systems reviewed and negative except as noted above.    Patient History:  Past Medical History:    Hyperlipidemia       History reviewed. No pertinent surgical history.     History reviewed. No pertinent family history.    Specific Social Determinants of Health:   Social History     Socioeconomic History    Marital status:    Tobacco Use    Smoking status: Every  Day     Current packs/day: 0.50     Average packs/day: 0.5 packs/day for 57.3 years (28.7 ttl pk-yrs)     Types: Cigarettes     Start date: 6/26/1967    Smokeless tobacco: Never   Vaping Use    Vaping status: Never Used   Substance and Sexual Activity    Alcohol use: No    Drug use: No    Sexual activity: Not Currently     Social Drivers of Health      Received from Ol[place], Olery    [facility]           hospitalsH elements reviewed from today and agreed except as otherwise stated in HPI.    Physical Exam     ED Triage Vitals [10/29/24 1238]   /81   Pulse 81   Resp 19   Temp 97.7 °F (36.5 °C)   Temp src Oral   SpO2 97 %   O2 Device None (Room air)       Current:/68   Pulse 77   Temp 97.7 °F (36.5 °C) (Oral)   Resp 15   Ht 156 cm (5' 1.42\")   Wt 63.5 kg   SpO2 98%   BMI 26.09 kg/m²         Physical Exam  Constitutional:       General: She is not in acute distress.  HENT:      Head: Normocephalic and atraumatic.      Mouth/Throat:      Mouth: Mucous membranes are moist.   Eyes:      Extraocular Movements: Extraocular movements intact.   Cardiovascular:      Rate and Rhythm: Normal rate and regular rhythm.      Heart sounds: Normal heart sounds.   Pulmonary:      Effort: Pulmonary effort is normal. No respiratory distress.      Breath sounds: Wheezing present.   Abdominal:      Palpations: Abdomen is soft.      Tenderness: There is no abdominal tenderness.      Comments: Epigastric, right upper and left upper quadrant abdominal tenderness, no guarding or rebound   Skin:     General: Skin is warm and dry.      Capillary Refill: Capillary refill takes less than 2 seconds.      Findings: No rash.   Neurological:      General: No focal deficit present.      Mental Status: She is alert and oriented to person, place, and time.   Psychiatric:         Mood and Affect: Mood normal.         Behavior: Behavior normal.         ED Course   Labs:   Labs Reviewed   COMP METABOLIC PANEL (14) - Abnormal;  Notable for the following components:       Result Value    Glucose 110 (*)     Sodium 134 (*)     ALT 8 (*)     All other components within normal limits   CBC WITH DIFFERENTIAL WITH PLATELET - Abnormal; Notable for the following components:    WBC 14.4 (*)     Neutrophil Absolute Prelim 12.43 (*)     Neutrophil Absolute 12.43 (*)     All other components within normal limits   TROPONIN I HIGH SENSITIVITY - Normal   LIPASE - Normal   LACTIC ACID, PLASMA   RAINBOW DRAW BLUE   RAINBOW DRAW GOLD   BLOOD CULTURE   BLOOD CULTURE     Radiology findings:  I personally reviewed the images.   CT ABDOMEN+PELVIS(CONTRAST ONLY)(CPT=74177)    Result Date: 10/29/2024  CONCLUSION:  1. Contained perforation 2nd portion of duodenum either related to perforated diverticulitis or ulcer.  Small para duodenal fluid collection measuring approximately 3 x 3.6 x 1.6 cm. 2. Multiple duodenal diverticula. 3. Moderate coronary artery calcification.  4. Findings were called to Dr. Lara.    Dictated by (CST): Dustin Hughes MD on 10/29/2024 at 3:46 PM     Finalized by (CST): Dustin Hughes MD on 10/29/2024 at 4:00 PM          XR CHEST AP PORTABLE  (CPT=71045)    Result Date: 10/29/2024  CONCLUSION:   Small bibasilar opacities, which may reflect atelectasis with or without superimposed pneumonia.     Dictated by (CST): Lalo King MD on 10/29/2024 at 1:33 PM     Finalized by (CST): Lalo King MD on 10/29/2024 at 1:35 PM           EKG as interpreted by me: Ventricular rate 77, normal sinus rhythm, left axis deviation, no ME interval prolongation, narrow QRS, QTc 427 ms, no ST segment elevations or depressions, isolated T wave inversions in aVL  Cardiac Monitor: Interpreted by me.   Pulse Readings from Last 1 Encounters:   10/29/24 77   , sinus,     External non-ED records reviewed independently by me: PCP note from 1/18/2024 reviewed confirming patient has past medical history of prior pneumonia in January 2024 and eustachian tube  dysfunction     MDM   73-year-old female with a past medical history of hyperlipidemia presenting for evaluation of epigastric abdominal pain, nausea of, vomiting and diarrhea over the past 4 days.  Upon arrival to the emergency department, her vitals are within normal limits.  She has reproducible epigastric and right upper and left upper quadrant abdominal tenderness with no rebound or guarding.    Differential diagnoses considered includes, but is not limited to: Gastritis, pancreatitis, gastroenteritis, colitis, pneumonia    Will obtain the following tests: CBC, CMP, lipase, troponin, chest x-ray, CT and pelvis with IV contrast, EKG  Please see ED course for my independent review of these tests/imaging results.    Initial Medications/Therapeutics administered: Zofran, morphine, Pepcid    Chronic conditions affecting care: Hyperlipidemia    ED Course as of 10/29/24 1657  ------------------------------------------------------------  Time: 10/29 1655  Comment: Labs with leukocytosis of 14.4.  I independently reviewed the chest x-ray images that show no evidence of lobar pneumonia.  Suspect chest x-ray findings more consistent with atelectasis.  CT abdomen pelvis with evidence of a contained perforation of the second portion of the duodenum concerning for underlying diverticulitis versus ulcer.  I discussed patient's case with the [facility] hospitalist, Dr. Tavarez, who accepted the patient for admission.  Started on broad-spectrum antibiotics.  I discussed patient's case with Dr. Sanches, gastroenterology, and Dr. Alvarez, general surgery, who were both made aware of new consult  ------------------------------------------------------------  Time: 10/29 1656  Comment: Lactate and blood cultures sent however patient has no vital sign abnormalities concerning for sepsis.  Does not fit SIRS criteria at this time.        Disposition and Plan     Clinical Impression:  1. Duodenal ulcer with perforation (HCC)         Disposition:  Admit    Follow-up:  No follow-up provider specified.    Medications Prescribed:  Current Discharge Medication List          Hospital Problems       Present on Admission  Date Reviewed: 6/24/2024            ICD-10-CM Noted POA    * (Principal) Perforated diverticulum of duodenum K57.00 10/29/2024 Unknown        This note may have been created using voice dictation technology and may include inadvertent errors.      Radha Lara MD  Emergency Medicine          Signed by Radha Lara MD on 10/29/2024  4:57 PM         MEDICATIONS ADMINISTERED IN LAST 1 DAY:  cefTRIAXone (Rocephin) 2 g in sodium chloride 0.9% 100 mL IVPB-ADDV       Date Action Dose Route User    10/29/2024 1656 New Bag 2 g Intravenous Sherita Lassiter RN          iopamidol 76% (ISOVUE-370) injection for power injector       Date Action Dose Route User    10/29/2024 1537 Given 80 mL Intravenous Elbert, Mary          potassium chloride 20 mEq in dextrose 5%-sodium chloride 0.45% 1000mL infusion premix       Date Action Dose Route User    10/30/2024 0336 New Bag (none) Intravenous Jie Raphael RN    10/29/2024 1946 New Bag (none) Intravenous Jie Raphael RN          metRONIDAZOLE in sodium chloride 0.79% (Flagyl) 5 mg/mL IVPB premix 500 mg       Date Action Dose Route User    10/29/2024 1815 New Bag 500 mg Intravenous Dorys Matta RN          metRONIDAZOLE in sodium chloride 0.79% (Flagyl) 5 mg/mL IVPB premix 500 mg       Date Action Dose Route User    10/30/2024 0504 New Bag 500 mg Intravenous Jie Raphael RN          nicotine (Nicoderm CQ) 21 MG/24HR patch 1 patch       Date Action Dose Route User    10/30/2024 0946 Patch Applied 1 patch Transdermal (Left Upper Arm) Sharon Arthur RN    10/29/2024 1946 Patch Applied 1 patch Transdermal (Right Upper Arm) Jie Raphael RN          pantoprazole (Protonix) 40 mg in sodium chloride 0.9% PF 10 mL IV push       Date Action Dose Route User    10/30/2024  0337 Given 40 mg Intravenous Jie Raphael RN    10/29/2024 1640 Given 40 mg Intravenous Sherita Lassiter, MOSES            Vitals (last day)       Date/Time Temp Pulse Resp BP SpO2 Weight O2 Device O2 Flow Rate (L/min) [facility]    10/30/24 1139 97.7 °F (36.5 °C) 70 18 119/66 95 % -- None (Room air) -- JR    10/30/24 0421 98 °F (36.7 °C) 71 16 129/70 97 % -- None (Room air) -- KJ    10/29/24 1940 98.3 °F (36.8 °C) 66 16 111/61 95 % -- None (Room air) -- KJ    10/29/24 1817 97.9 °F (36.6 °C) 72 18 124/66 96 % 135 lb 14.4 oz (61.6 kg) None (Room air) -- AJ    10/29/24 1430 -- 77 15 118/68 98 % -- None (Room air) -- EH    10/29/24 1238 97.7 °F (36.5 °C) 81 19 136/81 97 % 140 lb (63.5 kg) None (Room air) -- SE         10/29 H&P    CHIEF COMPLAINT:  Perforated duodenal ulcer.     HISTORY OF PRESENT ILLNESS:  Patient is a 73-year-old  female who presented to the emergency department for evaluation after she was seen at the urgent care center for epigastric abdominal pain earlier today.  Because of her persistent symptoms she was sent to the emergency department for evaluation.  CBC showed white blood cell count of 14.4 with left shift.  Lipase and troponin were negative.  Chemistry was unremarkable.  Lactic acid and blood cultures were obtained.  Chest x-ray showed no acute findings.  CT scan of the abdomen showed contained perforation of the second portion of duodenum either related to perforated diverticulitis or ulcer, small area of paraduodenal fluid collection measuring 3 x 3.6 x 1.6 cm.  Patient was started on IV Rocephin and Flagyl, IV Protonix, and she will be admitted to the hospital for further management.        ASSESSMENT:  Perforated second portion of duodenum, suspect perforated peptic ulcer disease, NSAID related, with paraduodenal fluid collection and possible abscess.       PLAN:  Patient will be admitted to general medical floor.  N.p.o.  IV Protonix.  IV fluids.  IV antibiotics.  Pain control.   Monitor temperature curve.  Monitor hemodynamic status.  Gastroenterology and general surgery consults.  Further recommendations to follow.     10/30 GEN SURGERY CONSULT NOTE    HISTORY OF PRESENT ILLNESS:  I was asked to see this 73-year-old female.  She was seen at an urgent center for epigastric pain.  Evidently, the pain had been going on for several days.  She did have some vomiting, some diarrhea over the past 2 days, but has been unable to eat.  Denies any chills.      PAST MEDICAL HISTORY:  Grossly negative.     PAST SURGICAL HISTORY:  She did have a laparoscopic cholecystectomy in the past.      LABORATORY DATA:  It was found that her white count was 14.4.  The rest of the chemistries were normal.     The patient did have a CT which showed possible ulcer in the periduodenal area, small fluid collection.  There was no free air on the x-ray.  There was also question on the x-ray could this have been a diverticulum in the second portion of the duodenum which ruptured.     ASSESSMENT AND PLAN:  I feel right now we will observe the patient.  We will talk to GI about possible EGD.  If not, I do feel either an upper GI or some type of contrast study of the stomach is indicated.  Will await their opinion.  Right now, we will continue observation.     10/30 HOSPITALIST NOTE    Subjective:  Patient resting comfortably in bed. He stated he felt hungry and denied any pain n/v/d or any other complaints.            Objective:  Review of Systems:   ROS completed; pertinent positive and negatives stated in subjective.        Vital signs:  Temp:  [97.7 °F (36.5 °C)-98.3 °F (36.8 °C)] 98 °F (36.7 °C)  Pulse:  [66-84] 71  Resp:  [15-19] 16  BP: (111-136)/(61-81) 129/70  SpO2:  [95 %-98 %] 97 %     Recent Labs   Lab 10/29/24  1317 10/30/24  0549   WBC 14.4* 6.7   HGB 14.4 12.7   MCV 90.6 91.8   .0 190.0              Recent Labs   Lab 10/29/24  1317 10/30/24  0549   * 98   BUN 13 9   CREATSERUM 0.74 0.59   CA 9.7  8.3*   ALB 4.4  --    * 139   K 4.3 3.5    107   CO2 25.0 25.0   ALKPHO 72  --    AST 11  --    ALT 8*  --    BILT 0.8  --    TP 6.8  --           Assessment & Plan:  Acute duodenal perforation  ?PUD  Imaging reviewed  Paraduodenal fluid collection and possible abscess  Gsx on consult  NPO  Continue IVF and IV abx  Contrast study if okay with GI  GI on consult  Possible EGD?  Continue IV Ceftriaxone and Flagyl  PRN pain control  Hypothyroidism  HLD  Resume home meds once tolerating PO intake     10/30 GI CONSULT NOTE    Reason for Consultation:  Abdominal pain     History of Present Illness:  Dorys Pickens is a a(n) 73 year old female w/ a history of HLD, +smoker, who presents with acute epigastric pain x 3 days. States it progressed and became severe associated with one episode of emesis and thus presented to the ER. Denies fever or chills. No diarrhea. No melena or hematochezia. Takes occasional ibuprofen for back pain. No prior EGD. Denies heartburn or dysphagia.   Patient seen this morning states pain is significantly improved this morning.       Assessment & Plan   Dorys Pickens is a a(n) 73 year old female w/ a history of HLD, +smoker, who presents with acute epigastric pain x 3 days. WBC 14 on admission. CT a/p shows a contained perforation in D2, possible complicated diverticulitis vs perforated peptic ulcer disease. Started on IV abx and appears to be clinically responding to medical management. Benign abdominal exam today.       Recommend:  - continue NPO except small ice chips  - continue PPI BID and IV antibiotics  - plan UGI with gastrograffin tomorrow to assess luminal integrity  - no plans for immediate EGD at this time

## 2024-10-30 NOTE — PROGRESS NOTES
[facility]  part of [facility]     Hospitalist Progress Note     Dorys Pickens Patient Status:  Inpatient    1950 MRN [id number]   Location [facility] 4W/SW/SE Attending Arpit Hutchins MD   Hosp Day # 1 PCP Cristel Arias MD     Subjective:     Patient resting comfortably in bed. He stated he felt hungry and denied any pain n/v/d or any other complaints.       Objective:    Review of Systems:   ROS completed; pertinent positive and negatives stated in subjective.      Vital signs:  Temp:  [97.7 °F (36.5 °C)-98.3 °F (36.8 °C)] 98 °F (36.7 °C)  Pulse:  [66-84] 71  Resp:  [15-19] 16  BP: (111-136)/(61-81) 129/70  SpO2:  [95 %-98 %] 97 %      Physical Exam:    Gen: NAD AO x3  Chest: good air entry CTABL  CVS: normal s1 and s2 RR  Abd: NABS soft NT ND  Neuro: CN 2-12 grossly intact  Ext: no edema in bilateral LE      Diagnostic Data:    Labs:  Recent Labs   Lab 10/29/24  1317 10/30/24  0549   WBC 14.4* 6.7   HGB 14.4 12.7   MCV 90.6 91.8   .0 190.0       Recent Labs   Lab 10/29/24  1317 10/30/24  0549   * 98   BUN 13 9   CREATSERUM 0.74 0.59   CA 9.7 8.3*   ALB 4.4  --    * 139   K 4.3 3.5    107   CO2 25.0 25.0   ALKPHO 72  --    AST 11  --    ALT 8*  --    BILT 0.8  --    TP 6.8  --        Estimated Creatinine Clearance: 65.4 mL/min (based on SCr of 0.59 mg/dL).    No results for input(s): \"PTP\", \"INR\" in the last 168 hours.           Imaging: Imaging data reviewed in Epic.    Medications:    pantoprazole  40 mg Intravenous Q12H    cefTRIAXone  2 g Intravenous Q24H    metRONIDAZOLE  500 mg Intravenous Q12H    nicotine  1 patch Transdermal Daily       Assessment & Plan:     Acute duodenal perforation  ?PUD  Imaging reviewed  Paraduodenal fluid collection and possible abscess  Gsx on consult  NPO  Continue IVF and IV abx  Contrast study if okay with GI  GI on consult  Possible EGD?  Continue IV Ceftriaxone and Flagyl  PRN pain  control  Hypothyroidism  HLD  Resume home meds once tolerating PO intake      Plan of care discussed with patient or family at bedside.      Supplementary Documentation:     Quality:  DVT Prophylaxis: SCDs, resume DVT ppx if okay with Gsx      Estimated date of discharge: TBD  Discharge is dependent on: clinical stability  At this point Ms. Pickens is expected to be discharge to: home                   Arpit Hutchins MD  Hospitalist    MDM: High, I personally reviewed the available laboratories, imaging including CT. I discussed the case with RN. I ordered laboratories, studies including AM labs.  Medical decision making high, risk is high.         The 21st Century Cures Act makes medical notes like these available to patients in the interest of transparency. Please be advised this is a medical document. Medical documents are intended to carry relevant information, facts as evident, and the clinical opinion of the practitioner. The medical note is intended as peer to peer communication and may appear blunt or direct. It is written in medical language and may contain abbreviations or verbiage that are unfamiliar.

## 2024-10-30 NOTE — CONSULTS
[facility]    PATIENT'S NAME: GLENN SETH   ATTENDING PHYSICIAN: Arpit Hutchins MD   CONSULTING PHYSICIAN: Isma Alvarez MD   PATIENT ACCOUNT#:   [id number]    LOCATION:  [address] [place] [address] [facility]  MEDICAL RECORD #:   [id number]       YOB: 1950  ADMISSION DATE:       10/29/2024      CONSULT DATE:  10/29/2024    REPORT OF CONSULTATION      HISTORY OF PRESENT ILLNESS:  I was asked to see this 73-year-old female.  She was seen at an urgent center for epigastric pain.  Evidently, the pain had been going on for several days.  She did have some vomiting, some diarrhea over the past 2 days, but has been unable to eat.  Denies any chills.     PAST MEDICAL HISTORY:  Grossly negative.    PAST SURGICAL HISTORY:  She did have a laparoscopic cholecystectomy in the past.     LABORATORY DATA:  It was found that her white count was 14.4.  The rest of the chemistries were normal.    The patient did have a CT which showed possible ulcer in the periduodenal area, small fluid collection.  There was no free air on the x-ray.  There was also question on the x-ray could this have been a diverticulum in the second portion of the duodenum which ruptured.    ASSESSMENT AND PLAN:  I feel right now we will observe the patient.  We will talk to GI about possible EGD.  If not, I do feel either an upper GI or some type of contrast study of the stomach is indicated.  Will await their opinion.  Right now, we will continue observation.     Dictated By Isma Alvarez MD  d: 10/30/2024 09:07:44  t: 10/30/2024 11:13:57  [facility] [id number]  [facility]/    cc: Arpit Hutchins MD

## 2024-10-31 ENCOUNTER — TELEPHONE (OUTPATIENT)
Facility: CLINIC | Age: 74
End: 2024-10-31

## 2024-10-31 ENCOUNTER — APPOINTMENT (OUTPATIENT)
Dept: GENERAL RADIOLOGY | Facility: HOSPITAL | Age: 74
End: 2024-10-31
Attending: INTERNAL MEDICINE
Payer: MEDICAID

## 2024-10-31 LAB
ALBUMIN SERPL-MCNC: 3.8 G/DL (ref 3.2–4.8)
ALBUMIN/GLOB SERPL: 1.7 {RATIO} (ref 1–2)
ALP LIVER SERPL-CCNC: 56 U/L
ALT SERPL-CCNC: 10 U/L
ANION GAP SERPL CALC-SCNC: 8 MMOL/L (ref 0–18)
AST SERPL-CCNC: 12 U/L (ref ?–34)
BASOPHILS # BLD AUTO: 0.03 X10(3) UL (ref 0–0.2)
BASOPHILS NFR BLD AUTO: 0.5 %
BILIRUB SERPL-MCNC: 0.3 MG/DL (ref 0.2–1.1)
BUN BLD-MCNC: 6 MG/DL (ref 9–23)
BUN/CREAT SERPL: 11.1 (ref 10–20)
CALCIUM BLD-MCNC: 8.6 MG/DL (ref 8.7–10.4)
CHLORIDE SERPL-SCNC: 110 MMOL/L (ref 98–112)
CO2 SERPL-SCNC: 24 MMOL/L (ref 21–32)
CREAT BLD-MCNC: 0.54 MG/DL
DEPRECATED RDW RBC AUTO: 41.4 FL (ref 35.1–46.3)
EGFRCR SERPLBLD CKD-EPI 2021: 97 ML/MIN/1.73M2 (ref 60–?)
EOSINOPHIL # BLD AUTO: 0.07 X10(3) UL (ref 0–0.7)
EOSINOPHIL NFR BLD AUTO: 1.2 %
ERYTHROCYTE [DISTWIDTH] IN BLOOD BY AUTOMATED COUNT: 12.4 % (ref 11–15)
GLOBULIN PLAS-MCNC: 2.3 G/DL (ref 2–3.5)
GLUCOSE BLD-MCNC: 106 MG/DL (ref 70–99)
HCT VFR BLD AUTO: 37.7 %
HGB BLD-MCNC: 13.1 G/DL
IMM GRANULOCYTES # BLD AUTO: 0.02 X10(3) UL (ref 0–1)
IMM GRANULOCYTES NFR BLD: 0.3 %
LYMPHOCYTES # BLD AUTO: 1.01 X10(3) UL (ref 1–4)
LYMPHOCYTES NFR BLD AUTO: 16.8 %
MCH RBC QN AUTO: 31.6 PG (ref 26–34)
MCHC RBC AUTO-ENTMCNC: 34.7 G/DL (ref 31–37)
MCV RBC AUTO: 90.8 FL
MONOCYTES # BLD AUTO: 0.49 X10(3) UL (ref 0.1–1)
MONOCYTES NFR BLD AUTO: 8.1 %
NEUTROPHILS # BLD AUTO: 4.4 X10 (3) UL (ref 1.5–7.7)
NEUTROPHILS # BLD AUTO: 4.4 X10(3) UL (ref 1.5–7.7)
NEUTROPHILS NFR BLD AUTO: 73.1 %
OSMOLALITY SERPL CALC.SUM OF ELEC: 292 MOSM/KG (ref 275–295)
PLATELET # BLD AUTO: 240 10(3)UL (ref 150–450)
POTASSIUM SERPL-SCNC: 3.6 MMOL/L (ref 3.5–5.1)
PROT SERPL-MCNC: 6.1 G/DL (ref 5.7–8.2)
RBC # BLD AUTO: 4.15 X10(6)UL
SODIUM SERPL-SCNC: 142 MMOL/L (ref 136–145)
WBC # BLD AUTO: 6 X10(3) UL (ref 4–11)

## 2024-10-31 PROCEDURE — 99233 SBSQ HOSP IP/OBS HIGH 50: CPT | Performed by: INTERNAL MEDICINE

## 2024-10-31 PROCEDURE — 74240 X-RAY XM UPR GI TRC 1CNTRST: CPT | Performed by: INTERNAL MEDICINE

## 2024-10-31 PROCEDURE — 99232 SBSQ HOSP IP/OBS MODERATE 35: CPT | Performed by: PHYSICIAN ASSISTANT

## 2024-10-31 RX ORDER — ZOLPIDEM TARTRATE 5 MG/1
5 TABLET ORAL NIGHTLY PRN
Status: DISCONTINUED | OUTPATIENT
Start: 2024-10-31 | End: 2024-11-14

## 2024-10-31 NOTE — PLAN OF CARE
Patient alert, oriented, no c/o pain, up walking in hallway, continue NPO can have ice chips, gastrografin done surgery, Gi and hospitalist aware of results, plan for PICC and TPN, dietitian on consult.  Problem: Patient Centered Care  Goal: Patient preferences are identified and integrated in the patient's plan of care  Description: Interventions:  - What would you like us to know as we care for you? Patient speaks Polish  - Provide timely, complete, and accurate information to patient/family  - Incorporate patient and family knowledge, values, beliefs, and cultural backgrounds into the planning and delivery of care  - Encourage patient/family to participate in care and decision-making at the level they choose  - Honor patient and family perspectives and choices  Outcome: Progressing     Problem: Patient/Family Goals  Goal: Patient/Family Long Term Goal  Description: Patient's Long Term Goal:     Interventions:  - See additional Care Plan goals for specific interventions  Outcome: Progressing  Goal: Patient/Family Short Term Goal  Description: Patient's Short Term Goal    Interventions:   - See additional Care Plan goals for specific interventions  Outcome: Progressing     Problem: RESPIRATORY - ADULT  Goal: Achieves optimal ventilation and oxygenation  Description: INTERVENTIONS:  - Assess for changes in respiratory status  - Assess for changes in mentation and behavior  - Position to facilitate oxygenation and minimize respiratory effort  - Oxygen supplementation based on oxygen saturation or ABGs  - Provide Smoking Cessation handout, if applicable  - Encourage broncho-pulmonary hygiene including cough, deep breathe, Incentive Spirometry  - Assess the need for suctioning and perform as needed  - Assess and instruct to report SOB or any respiratory difficulty  - Respiratory Therapy support as indicated  - Manage/alleviate anxiety  - Monitor for signs/symptoms of CO2 retention  Outcome: Progressing

## 2024-10-31 NOTE — DIETARY NOTE
ADULT NUTRITION INITIAL ASSESSMENT    Pt is at high nutrition risk.  Pt does not meet malnutrition criteria.        RECOMMENDATIONS TO MD:  Initiate Central Pareneral Nurition when PICC line is placed . PN prescription pending AM labs.    ADMITTING DIAGNOSIS:  Duodenal ulcer with perforation (HCC) [K26.5]  Perforated diverticulum of duodenum [K57.00]  PERTINENT PAST MEDICAL HISTORY:   Past Medical History:    Hyperlipidemia       PATIENT STATUS: Initial 10/31/24: Pt assessed due to consult to initiate TPN. Pt admit for duodenal ulcer with perforation. PMHx sig for HLD. Visited pt today, friend in the room. Polish  ID: [id number] utilized for interview. Diet Hx: Pt reported feeling hungry. Endorsed good appetite PTA. Endorsed intakes were decreased for only 2 days PTA due to GI symptoms:pain, one episode of vomiting, and headache. Pt reported that prior to onset of symptoms she was consuming her regular eating pattern of of breakfast of eggs or cheese sandwich, lunch of fruit , and dinner of meat and rice and soups. Weight Hx: pt denied weight loss at admission MST. Pt reported UBW: 138-140#. Per weight history on EHR, pt wt has ranged from  1337-143# over the last couple of years. .9#. Negligible weight loss, not significant. NFPE  conducted, no wasting noted. Pt appears well nourished and does not appear to be at refeeding risk. Nutrition Plan: Will order CPN when PICC line is placed. RD explained nature of Parenteral nutrition support. Pt verbalized understanding.    FOOD/NUTRITION RELATED HISTORY:  Appetite: NPO  Intake: NPO  Intake Meeting Needs: NPO  Percent Meals Eaten (last 3 days)       None            Food Allergies:  Mushrooms  *(common allergens to PN mixtures reviewed, none were reported)  Cultural/Ethnic/Tenriism Preferences: None    GASTROINTESTINAL: +BM 10/31 formed small green, and perforated duodenal ulcer per gastrograffin study today    MEDICATIONS: reviewed   pantoprazole  40 mg  Intravenous Q12H    cefTRIAXone  2 g Intravenous Q24H    metRONIDAZOLE  500 mg Intravenous Q12H    nicotine  1 patch Transdermal Daily       LABS: reviewed  Recent Labs     10/29/24  1317 10/30/24  0549 10/31/24  0534   * 98 106*   BUN 13 9 6*   CREATSERUM 0.74 0.59 0.54*   CA 9.7 8.3* 8.6*   * 139 142   K 4.3 3.5 3.6    107 110   CO2 25.0 25.0 24.0   OSMOCALC 279 287 292       NUTRITION RELATED PHYSICAL FINDINGS:  - Nutrition Focused Physical Exam (NFPE): no wasting noted  - Fluid Accumulation: none  See RN documentation for details  - Skin Integrity: intact See RN documentation for details    ANTHROPOMETRICS:  HT: 156 cm (5' 1.42\")  WT: 61.6 kg (135 lb 14.4 oz)   BMI: Body mass index is 25.33 kg/m².  BMI CLASSIFICATION: 25-29.9 kg/m2 - overweight  IBW: 105 lbs        128% IBW  Usual Body Wt: 138-140 lbs over a couple of years     97% UBW    WEIGHT HISTORY:  Patient Weight(s) for the past 336 hrs:   Weight   10/29/24 1817 61.6 kg (135 lb 14.4 oz)   10/29/24 1238 63.5 kg (140 lb)     Wt Readings from Last 10 Encounters:   10/29/24 61.6 kg (135 lb 14.4 oz)   06/24/24 62.1 kg (137 lb)   06/20/24 62.6 kg (138 lb)   06/20/24 62.6 kg (138 lb)   03/19/24 63 kg (139 lb)   01/18/24 62.7 kg (138 lb 3.2 oz)   09/26/23 62.1 kg (137 lb)   10/27/22 62.1 kg (137 lb)   02/08/22 64.9 kg (143 lb)   01/07/22 64.9 kg (143 lb)     NUTRITION DIAGNOSIS/PROBLEM:   Inadequate oral intake related to Decreased ability to consume sufficient energy due to altered GI function 2/2 perforated duodenal ulcer as evidenced by report of poor po 2 days PTA, current NPO status day 3, and need for PN.    NUTRITION INTERVENTION:     NUTRITION PRESCRIPTION:   Estimated Nutrition needs: --dosing wt of 61.6 kg - wt taken on 10/29/24  Calories: 5808-8247 calories/day (22-25 calories per kg Dosing wt)  Protein: 74 -80 g protein/day (1.2-1.3 g protein/kg Dosing wt)  Fluid Needs: 3237-6603 ml/day (cronological vs Madelia Segar)    - Diet:        Procedures    NPO        - Meals and snacks: NPO  - Medical Food Supplements-RD added NPO. Rational/use of oral supplements discussed.  - Vitamin and mineral supplements: NPO  - Feeding assistance: NPO  - Nutrition education:  Parenteral nutrition education completed     - Coordination of nutrition care: collaboration with other providers  - Discharge and transfer of nutrition care to new setting or provider: monitor plans    MONITOR AND EVALUATE/NUTRITION GOALS:  - Food and Nutrient Intake:      Monitor: for PO initiation when medically feasible  - Food and Nutrient Administration:      Monitor: TPN initiation, when PICC line is placed  - Anthropometric Measurement:    Monitor weight   - Nutrition Goals:     maintain wt within 5%, return to normal GI function, TPN to meet greater than 80% nutrition needs, and support body systems    DIETITIAN FOLLOW UP: RD to follow and monitor nutrition status.      Shira Neves RD, LDN  Clinical Dietitian  Available via Epic securechat  Ext. [phone]

## 2024-10-31 NOTE — PROGRESS NOTES
[facility]     Gastroenterology Progress Note    Dorys Pickens Patient Status:  Inpatient    1950 MRN [id number]   Location [facility] 4W/SW/SE Attending Arpit Hutchins MD   Hosp Day # 2 PCP Cristel Arias MD       Subjective:   Patient feeling well  Wanting to go home today   Denies abdominal pain, nausea or vomiting    Objective:   Blood pressure 112/53, pulse 82, temperature 97.7 °F (36.5 °C), temperature source Oral, resp. rate 18, height 5' 1.42\" (1.56 m), weight 135 lb 14.4 oz (61.6 kg), SpO2 97%, not currently breastfeeding. Body mass index is 25.33 kg/m².    Gen: awake, alert patient, NAD  HEENT: EOMI, the sclera appears anicteric, oropharynx clear, mucus membranes appear moist  CV: RRR  Lung: no conversational dyspnea   Abdomen: soft NTND abdomen with NABS appreciated   Skin: dry, warm, no jaundice  Ext: no LE edema is evident  Neuro: Alert and interactive  Psych: calm, cooperative    Assessment and Plan:     Dorys Pickens is a a(n) 73 year old female w/ a history of HLD, +smoker, who presents with acute epigastric pain x 3 days. WBC 14 on admission. CT a/p shows a contained perforation in D2, possible complicated diverticulitis vs perforated peptic ulcer disease. Started on IV abx and appears to be clinically responding to medical management. Benign abdominal exam today.       Recommend:  - continue PPI BID and IV antibiotics  -  UGI results pending  - diet per surgery  - no plans for immediate EGD at this time    TE sent for follow up in GI clinic for outpatient EGD.       Case discussed with Nuris Peres MD and Sharon LOPES.    Radha Dickens PA-C  [facility] Gastroenterology  10/31/2024      Results:     Lab Results   Component Value Date    WBC 6.0 10/31/2024    HGB 13.1 10/31/2024    HCT 37.7 10/31/2024    .0 10/31/2024    CREATSERUM 0.54 (L) 10/31/2024    BUN 6 (L) 10/31/2024     10/31/2024    K 3.6 10/31/2024     10/31/2024     CO2 24.0 10/31/2024     (H) 10/31/2024    CA 8.6 (L) 10/31/2024    ALB 3.8 10/31/2024    ALKPHO 56 10/31/2024    BILT 0.3 10/31/2024    TP 6.1 10/31/2024    AST 12 10/31/2024    ALT 10 10/31/2024    T4F 1.5 03/19/2024    TSH 2.029 03/19/2024    LIP 29 10/29/2024    MG 2.3 06/24/2019       CT ABDOMEN+PELVIS(CONTRAST ONLY)(CPT=74177)    Result Date: 10/29/2024  CONCLUSION:  1. Contained perforation 2nd portion of duodenum either related to perforated diverticulitis or ulcer.  Small para duodenal fluid collection measuring approximately 3 x 3.6 x 1.6 cm. 2. Multiple duodenal diverticula. 3. Moderate coronary artery calcification.  4. Findings were called to Dr. Lara.    Dictated by (CST): Dustin Hughes MD on 10/29/2024 at 3:46 PM     Finalized by (CST): Dustin Hughes MD on 10/29/2024 at 4:00 PM          XR CHEST AP PORTABLE  (CPT=71045)    Result Date: 10/29/2024  CONCLUSION:   Small bibasilar opacities, which may reflect atelectasis with or without superimposed pneumonia.     Dictated by (CST): Lalo King MD on 10/29/2024 at 1:33 PM     Finalized by (CST): Lalo King MD on 10/29/2024 at 1:35 PM         EKG 12 Lead    Result Date: 10/29/2024  Normal sinus rhythm Incomplete right bundle branch block Left anterior fascicular block Minimal voltage criteria for LVH, may be normal variant ( David product ) Abnormal ECG Confirmed by Walt Barrow ([id number]) on 10/29/2024 1:14:12 PM

## 2024-10-31 NOTE — PLAN OF CARE
Patient alert and orientated x 4.  Patient on RA, IVF, IV Rocephin, Flagyl, NPO, patient has no complaints of pain, ambulates to restroom, no BM overnight, patients personal belongings and call light within reach.  Safety measures in place.  Problem: Patient Centered Care  Goal: Patient preferences are identified and integrated in the patient's plan of care  Description: Interventions:  - What would you like us to know as we care for you? I live at home with my daughter, I speak polish.  - Provide timely, complete, and accurate information to patient/family  - Incorporate patient and family knowledge, values, beliefs, and cultural backgrounds into the planning and delivery of care  - Encourage patient/family to participate in care and decision-making at the level they choose  - Honor patient and family perspectives and choices  Outcome: Progressing     Problem: Patient/Family Goals  Goal: Patient/Family Long Term Goal  Description: Patient's Long Term Goal: To go home soon.    Interventions:  - monitor VS  -monitor lab values  -xray with contrast  - See additional Care Plan goals for specific interventions  Outcome: Progressing  Goal: Patient/Family Short Term Goal  Description: Patient's Short Term Goal: To walk the halls and to get some sleep.    Interventions:   - continue to ambulate    - See additional Care Plan goals for specific interventions  Outcome: Progressing     Problem: RESPIRATORY - ADULT  Goal: Achieves optimal ventilation and oxygenation  Description: INTERVENTIONS:  - Assess for changes in respiratory status  - Assess for changes in mentation and behavior  - Position to facilitate oxygenation and minimize respiratory effort  - Oxygen supplementation based on oxygen saturation or ABGs  - Provide Smoking Cessation handout, if applicable  - Encourage broncho-pulmonary hygiene including cough, deep breathe, Incentive Spirometry  - Assess the need for suctioning and perform as needed  - Assess and  instruct to report SOB or any respiratory difficulty  - Respiratory Therapy support as indicated  - Manage/alleviate anxiety  - Monitor for signs/symptoms of CO2 retention  Outcome: Progressing

## 2024-10-31 NOTE — PROGRESS NOTES
[facility]  part of [facility]    Progress Note    Dorys Pickens Patient Status:  Inpatient    1950 MRN [id number]   Location [facility] 4W/SW/SE Attending Arpit Hutchins MD   Hosp Day # 2 PCP Cristel Arias MD     Subjective:  Feels ok       Objective/Physical Exam:  General: Alert, orientated x3.  Cooperative.  No apparent distress.  Vital Signs:  Blood pressure 112/53, pulse 82, temperature 97.7 °F (36.5 °C), temperature source Oral, resp. rate 18, height 5' 1.42\" (1.56 m), weight 135 lb 14.4 oz (61.6 kg), SpO2 97%, not currently breastfeeding.  Abdomen:  Soft, neg     Labs:  Lab Results   Component Value Date    WBC 6.0 10/31/2024    HGB 13.1 10/31/2024    HCT 37.7 10/31/2024    .0 10/31/2024    CREATSERUM 0.54 (L) 10/31/2024    BUN 6 (L) 10/31/2024     10/31/2024    K 3.6 10/31/2024     10/31/2024    CO2 24.0 10/31/2024     (H) 10/31/2024    CA 8.6 (L) 10/31/2024    ALB 3.8 10/31/2024    ALKPHO 56 10/31/2024    BILT 0.3 10/31/2024    TP 6.1 10/31/2024    AST 12 10/31/2024    ALT 10 10/31/2024    T4F 1.5 2024    TSH 2.029 2024    LIP 29 10/29/2024    MG 2.3 2019       Imaging:  CT ABDOMEN+PELVIS(CONTRAST ONLY)(CPT=74177)    Result Date: 10/29/2024  PROCEDURE: CT ABDOMEN + PELVIS (CONTRAST ONLY) (CPT=74177)  COMPARISON: None.  INDICATIONS: Epigastric pain, nausea, and vomiting.  TECHNIQUE: CT images of the abdomen and pelvis were obtained with non-ionic intravenous contrast material.  Automated exposure control for dose reduction was used. Adjustment of the mA and/or kV was done based on the patient's size. Use of iterative reconstruction technique for dose reduction was used.  Dose information is transmitted to the ACR (American College of Radiology) NRDR (National Radiology Data Registry) which includes the Dose Index Registry.  FINDINGS:  LIVER: Too small to characterize hypoattenuating hepatic dome lesion. BILIARY: No evidence  for biliary dilatation. Post cholecystectomy. SPLEEN: Normal.  PANCREAS: Normal.  ADRENALS: Normal. KIDNEYS: Normal. AORTA/VASCULAR: Atherosclerotic vascular calcification including coronary artery calcification. No aneurysm or dissection.  LYMPHADENOPATHY: None. GI/MESENTERY: Multiple duodenal diverticula with a contained perforation of the 2nd portion of the duodenum with surrounding inflammatory changes including small para duodenal fluid collections measuring up to 1.6 x 3.6 cm Normal appendix.  Colonic diverticulosis.  No obstruction, bowel wall thickening, or mesenteric mass. ABDOMINAL WALL: Normal.  No mass or hernia.  URINARY BLADDER: Normal. ASCITES:   None. PELVIC ORGANS: No suspect pelvic mass. BONES: Mild-to-moderate chronic superior endplate compression fracture T8 and T10.  Minimal chronic anterior wedging of T9 and T11.  Schmorl's node associated with mild chronic superior endplate compression of L4.  No suspect bone lesion. LUNG BASES: Normal.  No visible pulmonary or pleural disease.  OTHER: Negative.          CONCLUSION:  1. Contained perforation 2nd portion of duodenum either related to perforated diverticulitis or ulcer.  Small para duodenal fluid collection measuring approximately 3 x 3.6 x 1.6 cm. 2. Multiple duodenal diverticula. 3. Moderate coronary artery calcification.  4. Findings were called to Dr. Lara.    Dictated by (CST): Dustin Hughes MD on 10/29/2024 at 3:46 PM     Finalized by (CST): Dustin Hughes MD on 10/29/2024 at 4:00 PM          XR CHEST AP PORTABLE  (CPT=71045)    Result Date: 10/29/2024  PROCEDURE: XR CHEST AP PORTABLE  (CPT=71045) TIME: 13:18.   COMPARISON: [facility], XR CHEST AP PORTABLE (CPT=71045), 6/20/2024, 1:27 PM.  [facility], XR CHEST PA + LAT CHEST (CPT=71046), 1/08/2024, 11:33 AM.  INDICATIONS: Epigastric pain, nausea, and vomitting x4 days. Diarrhea x1 day.  TECHNIQUE:   Single view.   FINDINGS:  CARDIAC/VASC: The  cardiomediastinal silhouette is unchanged in size.  There is atherosclerotic calcification of the thoracic aorta. MEDIAST/JENNIFER:   No visible mass or adenopathy. LUNGS/PLEURA: Small bibasilar opacities.  No pleural effusion.  No pneumothorax. BONES: Multilevel degenerative changes of the thoracic spine.  The thoracic compression fracture deformities were better demonstrated the prior radiograph dated 01/08/2024. OTHER: Negative.          CONCLUSION:   Small bibasilar opacities, which may reflect atelectasis with or without superimposed pneumonia.     Dictated by (CST): Lalo King MD on 10/29/2024 at 1:33 PM     Finalized by (CST): Lalo King MD on 10/29/2024 at 1:35 PM            Assessment/Plan:  Patient Active Problem List   Diagnosis    Osteoporosis    Contusion of right chest wall    Perforated diverticulum of duodenum    Duodenal ulcer with perforation (HCC)    Perforated duodenal ulcer (HCC)   Doing well  Await contrast study  If no leak  then start clears  Eventually EGD     DAVID TREVIÑO MD  10/31/2024  8:55 AM

## 2024-10-31 NOTE — PROGRESS NOTES
[facility]  part of [facility] Hospitalist Progress Note     Dorys Pickens Patient Status:  Inpatient    1950 MRN [id number]   Location [facility] 4W/SW/SE Attending Arpit Hutchins MD   Hosp Day # 2 PCP Cristel Arias MD     Subjective:     Patient resting comfortably in bed. No overnight events reported by the nursing staff.   In good spirits this morning.      Objective:    Review of Systems:   ROS completed; pertinent positive and negatives stated in subjective.      Vital signs:  Temp:  [97.7 °F (36.5 °C)-98.3 °F (36.8 °C)] 97.7 °F (36.5 °C)  Pulse:  [70-82] 82  Resp:  [18] 18  BP: (112-119)/(47-66) 112/53  SpO2:  [95 %-97 %] 97 %      Physical Exam:    Gen: NAD AO x3  Chest: good air entry CTABL  CVS: normal s1 and s2 RR  Abd: NABS soft NT ND  Neuro: CN 2-12 grossly intact  Ext: no edema in bilateral LE      Diagnostic Data:    Labs:  Recent Labs   Lab 10/29/24  1317 10/30/24  0549 10/31/24  0534   WBC 14.4* 6.7 6.0   HGB 14.4 12.7 13.1   MCV 90.6 91.8 90.8   .0 190.0 240.0       Recent Labs   Lab 10/29/24  1317 10/30/24  0549 10/31/24  0534   * 98 106*   BUN 13 9 6*   CREATSERUM 0.74 0.59 0.54*   CA 9.7 8.3* 8.6*   ALB 4.4  --  3.8   * 139 142   K 4.3 3.5 3.6    107 110   CO2 25.0 25.0 24.0   ALKPHO 72  --  56   AST 11  --  12   ALT 8*  --  10   BILT 0.8  --  0.3   TP 6.8  --  6.1       Estimated Creatinine Clearance: 71.5 mL/min (A) (based on SCr of 0.54 mg/dL (L)).    No results for input(s): \"PTP\", \"INR\" in the last 168 hours.           Imaging: Imaging data reviewed in Epic.    Medications:    pantoprazole  40 mg Intravenous Q12H    cefTRIAXone  2 g Intravenous Q24H    metRONIDAZOLE  500 mg Intravenous Q12H    nicotine  1 patch Transdermal Daily       Assessment & Plan:     Acute duodenal perforation  ?PUD  Imaging reviewed  Paraduodenal fluid collection and possible abscess  Gsx on consult  NPO  Continue IVF and IV abx  Contrast study  pending  Eventually EGD  GI on consult  No plans for EGD at this time  UGI with gastrograffin later today  Continue PPI BID and IV abx  Continue IV Ceftriaxone and Flagyl  PRN pain control  CLD after gastrograffin study, ADAT  Hypothyroidism  HLD  Resume home meds once tolerating PO intake      Plan of care discussed with patient or family at bedside.      Supplementary Documentation:     Quality:  DVT Prophylaxis: SCDs, resume DVT ppx if okay with Gsx      Estimated date of discharge: TBD  Discharge is dependent on: clinical stability  At this point Ms. Pickens is expected to be discharge to: home                   Arpit Hutchins MD  Hospitalist    MDM: High, I personally reviewed the available laboratories, imaging including CT. I discussed the case with RN. I ordered laboratories, studies including AM labs.  Medical decision making high, risk is high.         The 21st Century Cures Act makes medical notes like these available to patients in the interest of transparency. Please be advised this is a medical document. Medical documents are intended to carry relevant information, facts as evident, and the clinical opinion of the practitioner. The medical note is intended as peer to peer communication and may appear blunt or direct. It is written in medical language and may contain abbreviations or verbiage that are unfamiliar.

## 2024-11-01 ENCOUNTER — ANESTHESIA (OUTPATIENT)
Dept: ENDOSCOPY | Facility: HOSPITAL | Age: 74
End: 2024-11-01
Payer: MEDICAID

## 2024-11-01 ENCOUNTER — ANESTHESIA EVENT (OUTPATIENT)
Dept: ENDOSCOPY | Facility: HOSPITAL | Age: 74
End: 2024-11-01
Payer: MEDICAID

## 2024-11-01 ENCOUNTER — APPOINTMENT (OUTPATIENT)
Dept: PICC SERVICES | Facility: HOSPITAL | Age: 74
End: 2024-11-01
Attending: SPECIALIST
Payer: MEDICAID

## 2024-11-01 PROBLEM — K57.10 SMALL BOWEL DIVERTICULAR DISEASE: Status: ACTIVE | Noted: 2024-11-01

## 2024-11-01 LAB
ALBUMIN SERPL-MCNC: 3.5 G/DL (ref 3.2–4.8)
ALBUMIN/GLOB SERPL: 1.8 {RATIO} (ref 1–2)
ALP LIVER SERPL-CCNC: 49 U/L
ALT SERPL-CCNC: <7 U/L
ANION GAP SERPL CALC-SCNC: 6 MMOL/L (ref 0–18)
AST SERPL-CCNC: 9 U/L (ref ?–34)
BASOPHILS # BLD AUTO: 0.04 X10(3) UL (ref 0–0.2)
BASOPHILS NFR BLD AUTO: 1 %
BILIRUB SERPL-MCNC: 0.3 MG/DL (ref 0.2–1.1)
BUN BLD-MCNC: <5 MG/DL (ref 9–23)
CALCIUM BLD-MCNC: 8.4 MG/DL (ref 8.7–10.4)
CHLORIDE SERPL-SCNC: 112 MMOL/L (ref 98–112)
CO2 SERPL-SCNC: 26 MMOL/L (ref 21–32)
CREAT BLD-MCNC: 0.58 MG/DL
DEPRECATED RDW RBC AUTO: 41.8 FL (ref 35.1–46.3)
EGFRCR SERPLBLD CKD-EPI 2021: 95 ML/MIN/1.73M2 (ref 60–?)
EOSINOPHIL # BLD AUTO: 0.14 X10(3) UL (ref 0–0.7)
EOSINOPHIL NFR BLD AUTO: 3.5 %
ERYTHROCYTE [DISTWIDTH] IN BLOOD BY AUTOMATED COUNT: 12.5 % (ref 11–15)
GLOBULIN PLAS-MCNC: 2 G/DL (ref 2–3.5)
GLUCOSE BLD-MCNC: 100 MG/DL (ref 70–99)
HCT VFR BLD AUTO: 35.2 %
HGB BLD-MCNC: 12.3 G/DL
IMM GRANULOCYTES # BLD AUTO: 0.03 X10(3) UL (ref 0–1)
IMM GRANULOCYTES NFR BLD: 0.7 %
LYMPHOCYTES # BLD AUTO: 1.24 X10(3) UL (ref 1–4)
LYMPHOCYTES NFR BLD AUTO: 30.8 %
MAGNESIUM SERPL-MCNC: 2.1 MG/DL (ref 1.6–2.6)
MCH RBC QN AUTO: 32 PG (ref 26–34)
MCHC RBC AUTO-ENTMCNC: 34.9 G/DL (ref 31–37)
MCV RBC AUTO: 91.7 FL
MONOCYTES # BLD AUTO: 0.45 X10(3) UL (ref 0.1–1)
MONOCYTES NFR BLD AUTO: 11.2 %
NEUTROPHILS # BLD AUTO: 2.12 X10 (3) UL (ref 1.5–7.7)
NEUTROPHILS # BLD AUTO: 2.12 X10(3) UL (ref 1.5–7.7)
NEUTROPHILS NFR BLD AUTO: 52.8 %
PHOSPHATE SERPL-MCNC: 2.9 MG/DL (ref 2.4–5.1)
PLATELET # BLD AUTO: 244 10(3)UL (ref 150–450)
POTASSIUM SERPL-SCNC: 4.2 MMOL/L (ref 3.5–5.1)
PROT SERPL-MCNC: 5.5 G/DL (ref 5.7–8.2)
RBC # BLD AUTO: 3.84 X10(6)UL
SODIUM SERPL-SCNC: 144 MMOL/L (ref 136–145)
TRIGL SERPL-MCNC: 104 MG/DL (ref 30–149)
WBC # BLD AUTO: 4 X10(3) UL (ref 4–11)

## 2024-11-01 PROCEDURE — 02HV33Z INSERTION OF INFUSION DEVICE INTO SUPERIOR VENA CAVA, PERCUTANEOUS APPROACH: ICD-10-PCS | Performed by: INTERNAL MEDICINE

## 2024-11-01 PROCEDURE — 3E0436Z INTRODUCTION OF NUTRITIONAL SUBSTANCE INTO CENTRAL VEIN, PERCUTANEOUS APPROACH: ICD-10-PCS | Performed by: INTERNAL MEDICINE

## 2024-11-01 PROCEDURE — 99233 SBSQ HOSP IP/OBS HIGH 50: CPT | Performed by: INTERNAL MEDICINE

## 2024-11-01 PROCEDURE — 99232 SBSQ HOSP IP/OBS MODERATE 35: CPT | Performed by: INTERNAL MEDICINE

## 2024-11-01 PROCEDURE — 43239 EGD BIOPSY SINGLE/MULTIPLE: CPT | Performed by: INTERNAL MEDICINE

## 2024-11-01 PROCEDURE — 0DB68ZX EXCISION OF STOMACH, VIA NATURAL OR ARTIFICIAL OPENING ENDOSCOPIC, DIAGNOSTIC: ICD-10-PCS | Performed by: INTERNAL MEDICINE

## 2024-11-01 RX ORDER — HYDROMORPHONE HYDROCHLORIDE 1 MG/ML
0.6 INJECTION, SOLUTION INTRAMUSCULAR; INTRAVENOUS; SUBCUTANEOUS EVERY 5 MIN PRN
OUTPATIENT
Start: 2024-11-01

## 2024-11-01 RX ORDER — MORPHINE SULFATE 10 MG/ML
6 INJECTION, SOLUTION INTRAMUSCULAR; INTRAVENOUS EVERY 10 MIN PRN
OUTPATIENT
Start: 2024-11-01

## 2024-11-01 RX ORDER — MORPHINE SULFATE 4 MG/ML
4 INJECTION, SOLUTION INTRAMUSCULAR; INTRAVENOUS EVERY 10 MIN PRN
OUTPATIENT
Start: 2024-11-01

## 2024-11-01 RX ORDER — HYDROMORPHONE HYDROCHLORIDE 1 MG/ML
0.4 INJECTION, SOLUTION INTRAMUSCULAR; INTRAVENOUS; SUBCUTANEOUS EVERY 5 MIN PRN
OUTPATIENT
Start: 2024-11-01

## 2024-11-01 RX ORDER — HYDROMORPHONE HYDROCHLORIDE 1 MG/ML
0.2 INJECTION, SOLUTION INTRAMUSCULAR; INTRAVENOUS; SUBCUTANEOUS EVERY 5 MIN PRN
OUTPATIENT
Start: 2024-11-01

## 2024-11-01 RX ORDER — SODIUM CHLORIDE, SODIUM LACTATE, POTASSIUM CHLORIDE, CALCIUM CHLORIDE 600; 310; 30; 20 MG/100ML; MG/100ML; MG/100ML; MG/100ML
INJECTION, SOLUTION INTRAVENOUS CONTINUOUS PRN
Status: DISCONTINUED | OUTPATIENT
Start: 2024-11-01 | End: 2024-11-01 | Stop reason: SURG

## 2024-11-01 RX ORDER — LIDOCAINE HYDROCHLORIDE 10 MG/ML
5 INJECTION, SOLUTION EPIDURAL; INFILTRATION; INTRACAUDAL; PERINEURAL
Status: COMPLETED | OUTPATIENT
Start: 2024-11-01 | End: 2024-11-01

## 2024-11-01 RX ORDER — SODIUM CHLORIDE, SODIUM LACTATE, POTASSIUM CHLORIDE, CALCIUM CHLORIDE 600; 310; 30; 20 MG/100ML; MG/100ML; MG/100ML; MG/100ML
INJECTION, SOLUTION INTRAVENOUS CONTINUOUS
OUTPATIENT
Start: 2024-11-01

## 2024-11-01 RX ORDER — MORPHINE SULFATE 4 MG/ML
2 INJECTION, SOLUTION INTRAMUSCULAR; INTRAVENOUS EVERY 10 MIN PRN
OUTPATIENT
Start: 2024-11-01

## 2024-11-01 RX ORDER — NALOXONE HYDROCHLORIDE 0.4 MG/ML
0.08 INJECTION, SOLUTION INTRAMUSCULAR; INTRAVENOUS; SUBCUTANEOUS AS NEEDED
OUTPATIENT
Start: 2024-11-01 | End: 2024-11-01

## 2024-11-01 RX ADMIN — SODIUM CHLORIDE, SODIUM LACTATE, POTASSIUM CHLORIDE, CALCIUM CHLORIDE: 600; 310; 30; 20 INJECTION, SOLUTION INTRAVENOUS at 13:35:00

## 2024-11-01 NOTE — DIETARY NOTE
BRIEF DIETITIAN NOTE    Consult received to initiate and manage TPN. Chart reviewed. Pt assessed, see note from 10/31. PICC line in place. Reviewed Labs, Reviewed Meds. Ordered TPN for tonight as below. Please consult RD if earlier nutrition intervention is needed. Will follow per protocol.      - Central Parenteral Nutrition:   Via PICC, TPN order as follows: 2000 ml volume. 80 g protein, 900 non-protein calories (500 dextrose calories and 400 lipid calories). Will provide 1260 total calories and meet 80% calorie and 100% protein needs.        IVFs: *Discussed with attending MD about  discontinuing KCl D5w 0.45 NaCl IVF when TPN starts tonight @ 9 pm. RN communication sent   dextrose 10%      adult 3 in 1 TPN      potassium chloride in dextrose 5%-sodium chloride 0.45% 100 mL/hr at 10/31/24 2116       Recent Labs     10/29/24  1317 10/30/24  0549 10/31/24  0534 11/01/24  0547   * 98 106* 100*   BUN 13 9 6* <5*   CREATSERUM 0.74 0.59 0.54* 0.58   CA 9.7 8.3* 8.6* 8.4*   MG  --   --   --  2.1   * 139 142 144   K 4.3 3.5 3.6 4.2    107 110 112   CO2 25.0 25.0 24.0 26.0   PHOS  --   --   --  2.9   OSMOCALC 279 287 292  --          F/u per protocol or as appropriate.       Shira Neves RD, LDN  Clinical Dietitian  Available via Epic securechat  Ext. [phone]

## 2024-11-01 NOTE — PROGRESS NOTES
[facility]     Gastroenterology Progress Note    Dorys Pickens Patient Status:  Inpatient    1950 MRN [id number]   Location [facility] 4W/SW/SE Attending Arpit Hutchins MD   Hosp Day # 3 PCP Cristel Arias MD       Subjective:   Chief complaint: perforated duodenal ulcer    >>Feels well still, ambulating, wants to eat  -Denies ab pain  -No fevers or chills  -no CP/SOB  -no vomiting      Objective:   Blood pressure 122/61, pulse 62, temperature 97.7 °F (36.5 °C), temperature source Oral, resp. rate 16, height 5' 1.42\" (1.56 m), weight 135 lb 14.4 oz (61.6 kg), SpO2 96%, not currently breastfeeding. Body mass index is 25.33 kg/m².    Gen: awake, alert patient, NAD  HEENT: EOMI, the sclera appears anicteric, oropharynx clear, mucus membranes appear moist  CV: RRR  Lung: no conversational dyspnea   Abdomen: soft NT ND abdomen   Skin: dry, warm, no jaundice  Ext: no LE edema is evident  Neuro: Alert and interactive  Psych: calm, cooperative    Assessment and Plan:   Dorys Pickens is a a(n) 73 year old female w/ a history of HLD, +smoker, who presents with acute epigastric pain x 3 days. WBC 14 on admission. CT a/p shows a contained perforation in D2, possible complicated diverticulitis vs perforated peptic ulcer disease. Started on IV abx and appears to be clinically responding to medical management. Benign abdominal exam today.      #Perforated duodenum -  ulcer vs diverticulitis, noted on imaging on admission. Small para duodenal fluid collection measuring approximately 3 x 3.6 x 1.6 cm.  Clinically patient is feeling well, denies pain/fever.  >>Upper GI x-ray shows extraluminal extravasation of contrast at the descending portion of the duodenal sweep just distal to the duodenal bulb which appears to be contained and overall measures about 2.3 x 2.4 cm     Patient feels well today, d/w her re: x-ray results. She is getting a PICC line to initiate TPN which is a good idea.       Recommend:  - continue PPI BID and IV antibiotics  - will discuss with surgery re: next steps  - NPO  - DVT ppx      ADDENDUM:    -Discussed with Dr. Alvarez - recommendation to proceed with EGD to assess if ulcer or diverticulum. Risk of perforation worsening discussed w/Dr. Alvarez and patient as well who wants to proceed given unclear findings on imaging.     EGD consent: I have discussed the risks, benefits, and alternatives to upper endoscopy/enteroscopy with the patient/primary decision maker [who demonstrated understanding], including but not limited to the risks of bleeding, infection, pain, death, as well as the risks of anesthesia and perforation all leading to prolonged hospitalization, surgical intervention, or even death. I also specifically mentioned the miss rate of upper endoscopy of 5-10% in the best of all circumstances.  The patient has agreed to sign an informed consent and elected to proceed with procedure with possible intervention [i.e. polypectomy, stent placement, etc.] as indicated.      Results:     Lab Results   Component Value Date    WBC 4.0 11/01/2024    HGB 12.3 11/01/2024    HCT 35.2 11/01/2024    .0 11/01/2024    CREATSERUM 0.58 11/01/2024    BUN <5 (L) 11/01/2024     11/01/2024    K 4.2 11/01/2024     11/01/2024    CO2 26.0 11/01/2024     (H) 11/01/2024    CA 8.4 (L) 11/01/2024    ALB 3.5 11/01/2024    ALKPHO 49 (L) 11/01/2024    BILT 0.3 11/01/2024    TP 5.5 (L) 11/01/2024    AST 9 11/01/2024    ALT <7 (L) 11/01/2024    T4F 1.5 03/19/2024    TSH 2.029 03/19/2024    LIP 29 10/29/2024    MG 2.1 11/01/2024    PHOS 2.9 11/01/2024       XR UGI/ESOPHAGUS SINGLE CONTRAST (CPT=74240)    Result Date: 10/31/2024  CONCLUSION:  1. There is apparent extraluminal extravasation of contrast at the descending portion of the duodenal sweep just distal to the duodenal bulb which appears to be contained and overall measures about 2.3 x 2.4 cm, and it is difficult to tell if  this is all  extraluminal contained contrast or if there may be a diverticulum at this site with adjacent extravasation of contrast from a duodenal diverticular rupture.  There is no well-defined mass.  There is mild spasm and poor distention of the descending portion of the duodenal sweep.  Correlate clinically and with endoscopy.  There are two small to moderate adjacent diverticula noted of the transverse portion of the duodenal sweep without evidence of perforation or contrast extravasation. 2. Normal esophagus.  Normal appearance to the stomach which is poorly distended though.  No well-defined mass.    Dictated by (CST): Paul Aranda MD on 10/31/2024 at 1:24 PM     Finalized by (CST): Paul Aranda MD on 10/31/2024 at 1:38 PM

## 2024-11-01 NOTE — OPERATIVE REPORT
ESOPHAGOGASTRODUODENOSCOPY (EGD) REPORT    Dorys Pickens     1950 Age 73 year old   PCP Cristel Arias MD Endoscopist Ariane Peguero MD     Date of procedure: 24    Procedure: EGD w/cold biopsy    Pre-operative diagnosis: Duodenal perforation    Post-operative diagnosis: Duodenal diverticulosis with perforation    Medications: MAC    Complications: none    Procedure:  Informed consent was obtained from the patient after the risks of the procedure were discussed, including but not limited to bleeding, perforation, aspiration, infection, or possibility of a missed lesion. After discussions of the risks/benefits and alternatives to this procedure, as well as the planned sedation, the patient was placed in the left lateral decubitus position and begun on continuous blood pressure pulse oximetry and EKG monitoring and this was maintained throughout the procedure. Once an adequate level of sedation was obtained a bite block was placed. Then the lubricated tip of the [id number] diagnostic video upper endoscope was inserted and advanced using direct visualization into the posterior pharynx and ultimately into the esophagus. MINIMAL CO2 insufflation used to reduce air extravasation.    Complications: None    Findings:      1. Esophagus: The squamocolumnar junction was noted at 35 cm and appeared regular. The diaphragmatic pinch was noted noted at 35 cm from the incisors. The esophageal mucosa appeared normal. There was no endoscopic findings of esophagitis, stricture or Kim's esophagus.    2. Stomach: The stomach distended normally. Normal rugal folds were seen. The pylorus was patent. The gastric mucosa appeared mildly erythematous s/p random gastric biopsies. Retroflexion revealed a normal fundus and a non-patulous cardia.     3. Duodenum: The duodenal mucosa appeared normal in the first portion. At the duodenal sweep, at the \"12 o'clock\" position, there is a large wide mouth complex  diverticulum noted. This would be in the area of the duodenal ampulla, but the ampulla is not seen. Within the large complex diverticulum, there are small diverticlum, including a small os (opening) that is likely the site of perforation. Past this complex diverticulum, there are few more divercula in the 2nd portion noted.    Impression:  1. Duodenal diverticulitis with perforation visualized in the duodenal sweep.  2. No mass or ulcer noted.  3. Gastric erythema noted.    Recommend:  1. Await pathology.  2. Discussed with Dr. Alvarez who was present to see the endoscopy - plan for NPO + PICC/TPN. Re-image in a few days.  3. IV PPI to continue.  4. Avoid oral medications.    >>>If tissue was sampled/removed and you have not received your pathology results either by phone or letter within 2 weeks, please call our office at [phone].    Specimens: gastric  Blood loss: <1 ml

## 2024-11-01 NOTE — PROGRESS NOTES
[facility]  part of [facility]    Progress Note    Dorys Pickens Patient Status:  Inpatient    1950 MRN [id number]   Location [facility] 4W/SW/SE Attending Arpit Hutchins MD   Hosp Day # 3 PCP Cristel Arias MD     Pt seen in endoscopy with Dr. Peguero    Objective/Physical Exam:  General: Alert, orientated x3.  Cooperative.  No apparent distress.  Vital Signs:  Blood pressure 124/57, pulse 76, temperature 97.7 °F (36.5 °C), temperature source Oral, resp. rate 16, height 5' 1.42\" (1.56 m), weight 135 lb 14.4 oz (61.6 kg), SpO2 96%, not currently breastfeeding.  Labs:  Lab Results   Component Value Date    WBC 4.0 2024    HGB 12.3 2024    HCT 35.2 2024    .0 2024    CREATSERUM 0.58 2024    BUN <5 (L) 2024     2024    K 4.2 2024     2024    CO2 26.0 2024     (H) 2024    CA 8.4 (L) 2024    ALB 3.5 2024    ALKPHO 49 (L) 2024    BILT 0.3 2024    TP 5.5 (L) 2024    AST 9 2024    ALT <7 (L) 2024    T4F 1.5 2024    TSH 2.029 2024    LIP 29 10/29/2024    MG 2.1 2024    PHOS 2.9 2024       Imaging:  XR UGI/ESOPHAGUS SINGLE CONTRAST (CPT=74240)    Result Date: 10/31/2024  PROCEDURE: XR UGI/ESOPHAGUS SINGLE CONTRAST (CPT=74240)  COMPARISON: [facility], CT ABDOMEN + PELVIS (CONTRAST ONLY) (CPT=74177), 10/29/2024, 3:22 PM.   INDICATIONS: Contained perforation of the descending duodenum on recent CT scan..  TECHNIQUE:   A single contrast esophagram and upper gastrointestinal series was performed with fluoroscopy in the usual manner.  The patient was inadvertently given a moderate amount of thin barium for this examination as opposed to water-soluble Gastrografin contrast.   FLUOROSCOPY IMAGES OBTAINED:  22 FLUOROSCOPY TIME:  2.6 minute RADIATION DOSE (Dose Area Product):  3830.1-uGy*m^2 FINDINGS:  ESOPHAGUS STRUCTURE:    Normal.  No visible obstruction, stricture or dilation.  MOTILITY:   Normal.  HERNIA:   None visible.  REFLUX:   None visible.  OTHER:   Negative.   UPPER GI STOMACH:   Poorly distended stomach because of lack of air contrast technique.  No well-defined mass or outlet obstruction. DUODENUM:   There is apparent extraluminal extravasation of contrast at the descending portion of the duodenum where there is a 2.3 x 2.4 cm collection of contrast which extends beyond the lumen of the duodenum, it is difficult to tell if this is all free contained extravasated contrast at a site of perforation or if there may be an underlying diverticulum with associated adjacent extravasation from a duodenal diverticulum perforation.  There is mild spasm poor distention of the descending portion of  the duodenal sweep.  There are 2 moderate-sized diverticula identified in transverse portion the duodenum with one measuring 2.9 x 3.1 cm, and adjacent 1 measuring 1.2 x 1.4 cm.  There does not appear to be extravasation or perforation from the 2 adjacent diverticula of the duodenal sweep. OTHER:   Negative.              CONCLUSION:  1. There is apparent extraluminal extravasation of contrast at the descending portion of the duodenal sweep just distal to the duodenal bulb which appears to be contained and overall measures about 2.3 x 2.4 cm, and it is difficult to tell if this is all  extraluminal contained contrast or if there may be a diverticulum at this site with adjacent extravasation of contrast from a duodenal diverticular rupture.  There is no well-defined mass.  There is mild spasm and poor distention of the descending portion of the duodenal sweep.  Correlate clinically and with endoscopy.  There are two small to moderate adjacent diverticula noted of the transverse portion of the duodenal sweep without evidence of perforation or contrast extravasation. 2. Normal esophagus.  Normal appearance to the stomach which is poorly  distended though.  No well-defined mass.    Dictated by (CST): Paul Aranda MD on 10/31/2024 at 1:24 PM     Finalized by (CST): Paul Aranda MD on 10/31/2024 at 1:38 PM          CT ABDOMEN+PELVIS(CONTRAST ONLY)(CPT=74177)    Result Date: 10/29/2024  PROCEDURE: CT ABDOMEN + PELVIS (CONTRAST ONLY) (CPT=74177)  COMPARISON: None.  INDICATIONS: Epigastric pain, nausea, and vomiting.  TECHNIQUE: CT images of the abdomen and pelvis were obtained with non-ionic intravenous contrast material.  Automated exposure control for dose reduction was used. Adjustment of the mA and/or kV was done based on the patient's size. Use of iterative reconstruction technique for dose reduction was used.  Dose information is transmitted to the ACR (American College of Radiology) NRDR (National Radiology Data Registry) which includes the Dose Index Registry.  FINDINGS:  LIVER: Too small to characterize hypoattenuating hepatic dome lesion. BILIARY: No evidence for biliary dilatation. Post cholecystectomy. SPLEEN: Normal.  PANCREAS: Normal.  ADRENALS: Normal. KIDNEYS: Normal. AORTA/VASCULAR: Atherosclerotic vascular calcification including coronary artery calcification. No aneurysm or dissection.  LYMPHADENOPATHY: None. GI/MESENTERY: Multiple duodenal diverticula with a contained perforation of the 2nd portion of the duodenum with surrounding inflammatory changes including small para duodenal fluid collections measuring up to 1.6 x 3.6 cm Normal appendix.  Colonic diverticulosis.  No obstruction, bowel wall thickening, or mesenteric mass. ABDOMINAL WALL: Normal.  No mass or hernia.  URINARY BLADDER: Normal. ASCITES:   None. PELVIC ORGANS: No suspect pelvic mass. BONES: Mild-to-moderate chronic superior endplate compression fracture T8 and T10.  Minimal chronic anterior wedging of T9 and T11.  Schmorl's node associated with mild chronic superior endplate compression of L4.  No suspect bone lesion. LUNG BASES: Normal.  No visible pulmonary or  pleural disease.  OTHER: Negative.          CONCLUSION:  1. Contained perforation 2nd portion of duodenum either related to perforated diverticulitis or ulcer.  Small para duodenal fluid collection measuring approximately 3 x 3.6 x 1.6 cm. 2. Multiple duodenal diverticula. 3. Moderate coronary artery calcification.  4. Findings were called to Dr. Lraa.    Dictated by (CST): Dustin Hughes MD on 10/29/2024 at 3:46 PM     Finalized by (CST): Dustin Hughes MD on 10/29/2024 at 4:00 PM          XR CHEST AP PORTABLE  (CPT=71045)    Result Date: 10/29/2024  PROCEDURE: XR CHEST AP PORTABLE  (CPT=71045) TIME: 13:18.   COMPARISON: [facility], XR CHEST AP PORTABLE (CPT=71045), 6/20/2024, 1:27 PM.  [facility], XR CHEST PA + LAT CHEST (CPT=71046), 1/08/2024, 11:33 AM.  INDICATIONS: Epigastric pain, nausea, and vomitting x4 days. Diarrhea x1 day.  TECHNIQUE:   Single view.   FINDINGS:  CARDIAC/VASC: The cardiomediastinal silhouette is unchanged in size.  There is atherosclerotic calcification of the thoracic aorta. MEDIAST/JENNIFER:   No visible mass or adenopathy. LUNGS/PLEURA: Small bibasilar opacities.  No pleural effusion.  No pneumothorax. BONES: Multilevel degenerative changes of the thoracic spine.  The thoracic compression fracture deformities were better demonstrated the prior radiograph dated 01/08/2024. OTHER: Negative.          CONCLUSION:   Small bibasilar opacities, which may reflect atelectasis with or without superimposed pneumonia.     Dictated by (CST): Lalo King MD on 10/29/2024 at 1:33 PM     Finalized by (CST): Lalo King MD on 10/29/2024 at 1:35 PM            Assessment/Plan:  Patient Active Problem List   Diagnosis    Osteoporosis    Contusion of right chest wall    Perforated diverticulum of duodenum    Duodenal ulcer with perforation (HCC)    Perforated duodenal ulcer (HCC)    Small bowel diverticular disease   Findings as stated in OR report - diverticuli  Difficult  to tell if continued leak vs residual   Will keep on TPN  Re-image soon  Try conservative rx    DAVID TREVIÑO MD  11/1/2024  4:03 PM

## 2024-11-01 NOTE — ANESTHESIA POSTPROCEDURE EVALUATION
Patient: Dorys Pickens    Procedure Summary       Date: 11/01/24 Room / Location: [facility] ENDOSCOPY 05 / [facility] ENDOSCOPY    Anesthesia Start: 1334 Anesthesia Stop: 1352    Procedure: ESOPHAGOGASTRODUODENOSCOPY (EGD) Diagnosis: (duodenal diverticulosis)    Surgeons: GORDO Peguero MD Anesthesiologist: Justyna Wisdom MD, PhD    Anesthesia Type: MAC ASA Status: 3            Anesthesia Type: No value filed.    Vitals Value Taken Time   /67 11/01/24 1352   Temp 97.7 11/01/24 1352   Pulse 78 11/01/24 1352   Resp 14 11/01/24 1352   SpO2 98 11/01/24 1352       [facility] AN Post Evaluation:   Patient Evaluated in PACU  Patient Participation: complete - patient participated  Level of Consciousness: awake and alert  Pain Score: 2  Pain Management: adequate  Airway Patency:patent  Yes    Nausea/Vomiting: none  Cardiovascular Status: acceptable  Respiratory Status: acceptable  Postoperative Hydration acceptable      Justyna Wisdom MD, PhD  11/1/2024 1:52 PM

## 2024-11-01 NOTE — ANESTHESIA PREPROCEDURE EVALUATION
Anesthesia PreOp Note    HPI:     Dorys Pickens is a 73 year old female who presents for preoperative consultation requested by: GORDO Peguero MD    Date of Surgery: 10/29/2024 - 11/1/2024    Procedure(s):  ESOPHAGOGASTRODUODENOSCOPY (EGD)  Indication: abdominal pain, duodenal ulcer?    Relevant Problems   No relevant active problems       NPO:  Last Liquid Consumption Date: 10/30/24  Last Liquid Consumption Time: 0000  Last Solid Consumption Date: 10/30/24  Last Solid Consumption Time: 0000  Last Liquid Consumption Date: 10/30/24          History Review:  Patient Active Problem List    Diagnosis Date Noted    Perforated diverticulum of duodenum 10/29/2024    Duodenal ulcer with perforation (HCC) 10/29/2024    Perforated duodenal ulcer (HCC) 10/29/2024    Contusion of right chest wall 01/18/2019    Osteoporosis 05/20/2015       Past Medical History:    Disorder of thyroid    High cholesterol    Hyperlipidemia       Past Surgical History:   Procedure Laterality Date    Cholecystectomy      Egd N/A 11/01/2024    Dr. Peguero       Prescriptions Prior to Admission[1]  Current Medications and Prescriptions Ordered in Epic[2]    Allergies[3]    History reviewed. No pertinent family history.  Social History     Socioeconomic History    Marital status:    Tobacco Use    Smoking status: Every Day     Current packs/day: 0.50     Average packs/day: 0.5 packs/day for 57.4 years (28.7 ttl pk-yrs)     Types: Cigarettes     Start date: 6/26/1967    Smokeless tobacco: Never   Vaping Use    Vaping status: Never Used   Substance and Sexual Activity    Alcohol use: No    Drug use: No    Sexual activity: Not Currently       Available pre-op labs reviewed.  Lab Results   Component Value Date    WBC 4.0 11/01/2024    RBC 3.84 11/01/2024    HGB 12.3 11/01/2024    HCT 35.2 11/01/2024    MCV 91.7 11/01/2024    MCH 32.0 11/01/2024    MCHC 34.9 11/01/2024    RDW 12.5 11/01/2024    .0 11/01/2024     Lab Results   Component  Value Date     11/01/2024    K 4.2 11/01/2024     11/01/2024    CO2 26.0 11/01/2024    BUN <5 (L) 11/01/2024    CREATSERUM 0.58 11/01/2024     (H) 11/01/2024    CA 8.4 (L) 11/01/2024          Vital Signs:  Body mass index is 25.33 kg/m².   height is 1.56 m (5' 1.42\") and weight is 61.6 kg (135 lb 14.4 oz). Her oral temperature is 97.7 °F (36.5 °C). Her blood pressure is 124/57 and her pulse is 76. Her respiration is 16 and oxygen saturation is 96%.   Vitals:    10/31/24 1244 10/31/24 2105 11/01/24 0512 11/01/24 1313   BP: 146/71 152/68 122/61 124/57   Pulse: 74 80 62 76   Resp: 18 16 16 16   Temp: 97.8 °F (36.6 °C) 97.7 °F (36.5 °C)     TempSrc: Oral Oral     SpO2: 98% 95% 96%    Weight:       Height:            Anesthesia Evaluation     Patient summary reviewed    Airway   Mallampati: III  Dental    (+) upper dentures and lower dentures    Pulmonary - normal exam   Cardiovascular     Rhythm: regular  Rate: normal    Neuro/Psych      GI/Hepatic/Renal      Endo/Other    Abdominal  - normal exam                 Anesthesia Plan:   ASA:  3  Plan:   MAC  Informed Consent Plan and Risks Discussed With:  Patient      I have informed Dorys Pickens and/or legal guardian or family member of the nature of the anesthetic plan, benefits, risks including possible dental damage if relevant, major complications, and any alternative forms of anesthetic management.   All of the patient's questions were answered to the best of my ability. The patient desires the anesthetic management as planned.  Justyna Wsidom MD, PhD  11/1/2024 1:28 PM  Present on Admission:  **None**           [1]   Medications Prior to Admission   Medication Sig Dispense Refill Last Dose/Taking    albuterol 108 (90 Base) MCG/ACT Inhalation Aero Soln Inhale 2 puffs into the lungs every 4 (four) hours as needed for Wheezing. 18 g 3 Past Week    alendronate 70 MG Oral Tab TAKE 1 TABLET BY MOUTH EVERY WEEK (Patient taking differently: 1 tablet (70  mg total). TAKE 1 TABLET BY MOUTH EVERY WEEK) 12 tablet 3 8/29/2024    ergocalciferol 1.25 MG (17290 UT) Oral Cap Take 1 capsule (50,000 Units total) by mouth once a week. 12 capsule 3 8/27/2024    levothyroxine 88 MCG Oral Tab Take 1 tablet (88 mcg total) by mouth before breakfast. 90 tablet 3 10/29/2024 Morning    simvastatin 10 MG Oral Tab Take 1 tablet (10 mg total) by mouth nightly. 90 tablet 3 10/28/2024    zolpidem 10 MG Oral Tab Take 1 tablet (10 mg total) by mouth nightly as needed for Sleep. 30 tablet 3 10/28/2024    propranolol 20 MG Oral Tab Take 1 tablet (20 mg total) by mouth 2 (two) times daily. 60 tablet 11 10/29/2024    aspirin (ASPIRIN LOW DOSE) 81 MG Oral Tab EC Take 1 tablet (81 mg total) by mouth daily. 30 tablet 11 10/29/2024    benzonatate 200 MG Oral Cap Take 1 capsule (200 mg total) by mouth 3 (three) times daily as needed for cough. 21 capsule 0     fluticasone propionate 50 MCG/ACT Nasal Suspension 2 sprays by Nasal route daily. 16 g 0     TRAZODONE 100 MG Oral Tab TAKE 1 TABLET(100 MG) BY MOUTH EVERY NIGHT 90 tablet 3     traMADol 50 MG Oral Tab Take 1 tablet (50 mg total) by mouth every 6 (six) hours as needed for Pain. 30 tablet 0     diclofenac 50 MG Oral Tab EC Take 1 tablet (50 mg total) by mouth 2 (two) times daily. 180 tablet 3     predniSONE 10 MG Oral Tab Take 1 tablet (10 mg total) by mouth 2 (two) times daily. 20 tablet 0     levocetirizine 5 MG Oral Tab Take 1 tablet (5 mg total) by mouth every evening. 30 tablet 0     fluconazole 100 MG Oral Tab Take 1 tablet (100 mg total) by mouth daily. 3 tablet 0     pantoprazole 40 MG Oral Tab EC Take 1 tablet (40 mg total) by mouth every morning before breakfast. 90 tablet 3     HYDROcodone-acetaminophen (NORCO) 5-325 MG Oral Tab Take 1 tablet by mouth every 6 (six) hours as needed for Pain. 40 tablet 0     gabapentin 100 MG Oral Cap Take 1 capsule (100 mg total) by mouth nightly. 30 capsule 3     FLUTICASONE PROPIONATE 50 MCG/ACT Nasal  Suspension SHAKE LIQUID AND INSTILL 2 SPRAYS IN EACH NOSTRIL EVERY DAY 16 g 0     LORazepam 0.5 MG Oral Tab Take 1 tablet (0.5 mg total) by mouth every 6 (six) hours as needed for Anxiety. 30 tablet 3     nystatin-triamcinolone 952486-4.1 UNIT/GM-% External Cream Topically bid 1 Tube 3     furosemide 40 MG Oral Tab Take 1 tablet (40 mg total) by mouth daily. 30 tablet 1     Doxycycline Hyclate 100 MG Oral Cap Take 1 capsule (100 mg total) by mouth 2 (two) times daily. 20 capsule 0     Albuterol Sulfate HFA (VENTOLIN HFA) 108 (90 Base) MCG/ACT Inhalation Aero Soln Inhale 2 puffs into the lungs every 4 (four) hours as needed for Wheezing. 1 Inhaler 6     ibuprofen 600 MG Oral Tab Take 1 tablet (600 mg total) by mouth every 6 (six) hours as needed for Pain. 60 tablet 1     Cyclobenzaprine HCl 10 MG Oral Tab Take 1 tablet (10 mg total) by mouth 3 (three) times daily as needed for Muscle spasms. 30 tablet 0     traMADol HCl 50 MG Oral Tab Take 1 tablet (50 mg total) by mouth every 6 (six) hours as needed for Pain. 40 tablet 1     Diclofenac Sodium (VOLTAREN) 1 % Transdermal Gel Apply 2 g topically 4 (four) times daily. 5 Tube 11     triamcinolone acetonide 0.1 % External Cream Apply topically 2 (two) times daily as needed. 60 g 3     FUNGOID TINCTURE 2 % External Solution APPLY AA D  2     ibuprofen 400 MG Oral Tab Take 1 tablet (400 mg total) by mouth every 6 (six) hours as needed for Pain. 60 tablet 11     clotrimazole-betamethasone 1-0.05 % External Cream Topically bid 60 g 3    [2]   Current Facility-Administered Medications Ordered in Epic   Medication Dose Route Frequency Provider Last Rate Last Admin    dextrose 10% infusion (TPN no rate)   Intravenous Continuous PRN Isma Alvarez MD        adult 3 in 1 TPN   Intravenous Continuous TPN Isma Alvarez MD        zolpidem (Ambien) tab 5 mg  5 mg Oral Nightly PRN Arpit Hutchins MD   5 mg at 10/31/24 2115    pantoprazole (Protonix) 40 mg in sodium chloride 0.9%  PF 10 mL IV push  40 mg Intravenous Q12H Jazz Sanches MD   40 mg at 11/01/24 0516    potassium chloride 20 mEq in dextrose 5%-sodium chloride 0.45% 1000mL infusion premix   Intravenous Continuous Lucia Tavarez  mL/hr at 10/31/24 2116 New Bag at 10/31/24 2116    morphINE PF 2 MG/ML injection 1 mg  1 mg Intravenous Q2H PRN Lucia Tavarez MD        Or    morphINE PF 2 MG/ML injection 2 mg  2 mg Intravenous Q2H PRN Lucia Tavarez MD        Or    morphINE PF 4 MG/ML injection 4 mg  4 mg Intravenous Q2H PRN Lucia Tavarez MD        ondansetron (Zofran) 4 MG/2ML injection 4 mg  4 mg Intravenous Q6H PRN Lucia Tavarez MD        metoclopramide (Reglan) 5 mg/mL injection 5 mg  5 mg Intravenous Q8H PRN Lucia Tavarez MD        cefTRIAXone (Rocephin) 2 g in sodium chloride 0.9% 100 mL IVPB-ADDV  2 g Intravenous Q24H Lucia Tavarez  mL/hr at 10/31/24 1629 2 g at 10/31/24 1629    metRONIDAZOLE in sodium chloride 0.79% (Flagyl) 5 mg/mL IVPB premix 500 mg  500 mg Intravenous Q12H Lucia Tavarez  mL/hr at 11/01/24 0516 500 mg at 11/01/24 0516    albuterol (Ventolin HFA) 108 (90 Base) MCG/ACT inhaler 2 puff  2 puff Inhalation Q4H PRN Lucia Tavarez MD        nicotine (Nicoderm CQ) 21 MG/24HR patch 1 patch  1 patch Transdermal Daily Lucia Tavarez MD   1 patch at 11/01/24 1205    Tobramycin Sulfate (TOBREX) 0.3 % ophthalmic ointment   Both Eyes QID PruseCristel salter MD         No current Epic-ordered outpatient medications on file.   [3]   Allergies  Allergen Reactions    Mushrooms ANAPHYLAXIS    Pcn [Penicillins] SHORTNESS OF BREATH     Pt tolerated ceftriaxone in the ED on 10/29/24    Amoxicillin     Sulfa Antibiotics

## 2024-11-01 NOTE — PROGRESS NOTES
[facility]  [facility]     Dorys Pickens Patient Status:  Inpatient    1950 MRN [id number]   Location [facility] 4W/SW/SE Attending Arpit Hutchins MD   Hosp Day # 3 PCP Cristel Arias MD     Subjective:     Patient resting comfortably in bed. No overnight events reported by the nursing staff.   In good spirits this morning. Looking forward to speaking with surgeon later today.   TPN starting later today.       Objective:    Review of Systems:   ROS completed; pertinent positive and negatives stated in subjective.      Vital signs:  Temp:  [97.7 °F (36.5 °C)-97.8 °F (36.6 °C)] 97.7 °F (36.5 °C)  Pulse:  [62-80] 62  Resp:  [16-18] 16  BP: (122-152)/(61-71) 122/61  SpO2:  [95 %-98 %] 96 %      Physical Exam:    Gen: NAD AO x3  Chest: good air entry CTABL  CVS: normal s1 and s2 RR  Abd: NABS soft NT ND  Neuro: CN 2-12 grossly intact  Ext: no edema in bilateral LE      Diagnostic Data:    Labs:  Recent Labs   Lab 10/29/24  1317 10/30/24  0549 10/31/24  0534 24  0547   WBC 14.4* 6.7 6.0 4.0   HGB 14.4 12.7 13.1 12.3   MCV 90.6 91.8 90.8 91.7   .0 190.0 240.0 244.0       Recent Labs   Lab 10/29/24  1317 10/30/24  0549 10/31/24  0534 24  0547   * 98 106* 100*   BUN 13 9 6* <5*   CREATSERUM 0.74 0.59 0.54* 0.58   CA 9.7 8.3* 8.6* 8.4*   ALB 4.4  --  3.8 3.5   * 139 142 144   K 4.3 3.5 3.6 4.2    107 110 112   CO2 25.0 25.0 24.0 26.0   ALKPHO 72  --  56 49*   AST 11  --  12 9   ALT 8*  --  10 <7*   BILT 0.8  --  0.3 0.3   TP 6.8  --  6.1 5.5*       Estimated Creatinine Clearance: 66.6 mL/min (based on SCr of 0.58 mg/dL).    No results for input(s): \"PTP\", \"INR\" in the last 168 hours.           Imaging: Imaging data reviewed in Epic.    Medications:    pantoprazole  40 mg Intravenous Q12H    cefTRIAXone  2 g Intravenous Q24H    metRONIDAZOLE  500 mg Intravenous Q12H    nicotine  1 patch Transdermal Daily        Assessment & Plan:     Acute duodenal perforation  ?PUD  Imaging reviewed  Paraduodenal fluid collection and possible abscess  Contrast study showing extraluminal extravastation of the contrast  GI on consult  No plans for EGD at this time  UGI with gastrograffin done  Continue PPI BID and IV abx  Discuss with surgeon regarding next steps  Gsx on consult  Continue IVF and IV abx  Eventually EGD?  Appreciate further Gsx recs  Continue IV Ceftriaxone and Flagyl  NPO at this time, TPN via PICC line starting 11/01/2024  PRN pain control  Hypothyroidism  HLD  Resume home meds once tolerating PO intake      Plan of care discussed with patient or family at bedside.      Supplementary Documentation:     Quality:  DVT Prophylaxis: SCDs, resume DVT ppx if okay with Gsx      Estimated date of discharge: TBD  Discharge is dependent on: clinical stability  At this point Ms. Pickens is expected to be discharge to: home                   Arpit Hutchins MD  Hospitalist    MDM: High, I personally reviewed the available laboratories, imaging including CT. I discussed the case with RN. I ordered laboratories, studies including AM labs.  Medical decision making high, risk is high.         The 21st Century Cures Act makes medical notes like these available to patients in the interest of transparency. Please be advised this is a medical document. Medical documents are intended to carry relevant information, facts as evident, and the clinical opinion of the practitioner. The medical note is intended as peer to peer communication and may appear blunt or direct. It is written in medical language and may contain abbreviations or verbiage that are unfamiliar.

## 2024-11-01 NOTE — PLAN OF CARE
Patient is A/Ox4 on room air. Polish speaking mainly. VSS. Up self. Denies pain and nausea. Ambien for sleep. NPO. Plan for PICC and TPN tomorrow. Call light within reach; safety precautions in place.   Problem: Patient/Family Goals  Goal: Patient/Family Short Term Goal  Description: Patient's Short Term Goal:     Interventions:   -   - See additional Care Plan goals for specific interventions  Outcome: Progressing     Problem: RESPIRATORY - ADULT  Goal: Achieves optimal ventilation and oxygenation  Description: INTERVENTIONS:  - Assess for changes in respiratory status  - Assess for changes in mentation and behavior  - Position to facilitate oxygenation and minimize respiratory effort  - Oxygen supplementation based on oxygen saturation or ABGs  - Provide Smoking Cessation handout, if applicable  - Encourage broncho-pulmonary hygiene including cough, deep breathe, Incentive Spirometry  - Assess the need for suctioning and perform as needed  - Assess and instruct to report SOB or any respiratory difficulty  - Respiratory Therapy support as indicated  - Manage/alleviate anxiety  - Monitor for signs/symptoms of CO2 retention  Outcome: Progressing

## 2024-11-01 NOTE — INTERVAL H&P NOTE
Pre-op Diagnosis: abdominal pain, duodenal ulcer?    The above referenced H&P was reviewed by GORDO Peguero MD on 11/1/2024, the patient was examined and no significant changes have occurred in the patient's condition since the H&P was performed.  I discussed with the patient and/or legal representative the potential benefits, risks and side effects of this procedure; the likelihood of the patient achieving goals; and potential problems that might occur during recuperation.  I discussed reasonable alternatives to the procedure, including risks, benefits and side effects related to the alternatives and risks related to not receiving this procedure.  We will proceed with procedure as planned.

## 2024-11-02 LAB
ALBUMIN SERPL-MCNC: 3.3 G/DL (ref 3.2–4.8)
ALBUMIN/GLOB SERPL: 1.5 {RATIO} (ref 1–2)
ALP LIVER SERPL-CCNC: 48 U/L
ALT SERPL-CCNC: <7 U/L
ANION GAP SERPL CALC-SCNC: 6 MMOL/L (ref 0–18)
AST SERPL-CCNC: 11 U/L (ref ?–34)
BASOPHILS # BLD AUTO: 0.05 X10(3) UL (ref 0–0.2)
BASOPHILS NFR BLD AUTO: 0.9 %
BILIRUB SERPL-MCNC: 0.2 MG/DL (ref 0.2–1.1)
BUN BLD-MCNC: 7 MG/DL (ref 9–23)
BUN/CREAT SERPL: 14.6 (ref 10–20)
CALCIUM BLD-MCNC: 8.7 MG/DL (ref 8.7–10.4)
CHLORIDE SERPL-SCNC: 111 MMOL/L (ref 98–112)
CO2 SERPL-SCNC: 25 MMOL/L (ref 21–32)
CREAT BLD-MCNC: 0.48 MG/DL
DEPRECATED RDW RBC AUTO: 41.8 FL (ref 35.1–46.3)
EGFRCR SERPLBLD CKD-EPI 2021: 100 ML/MIN/1.73M2 (ref 60–?)
EOSINOPHIL # BLD AUTO: 0.19 X10(3) UL (ref 0–0.7)
EOSINOPHIL NFR BLD AUTO: 3.5 %
ERYTHROCYTE [DISTWIDTH] IN BLOOD BY AUTOMATED COUNT: 12.5 % (ref 11–15)
GLOBULIN PLAS-MCNC: 2.2 G/DL (ref 2–3.5)
GLUCOSE BLD-MCNC: 105 MG/DL (ref 70–99)
GLUCOSE BLDC GLUCOMTR-MCNC: 104 MG/DL (ref 70–99)
GLUCOSE BLDC GLUCOMTR-MCNC: 110 MG/DL (ref 70–99)
GLUCOSE BLDC GLUCOMTR-MCNC: 112 MG/DL (ref 70–99)
GLUCOSE BLDC GLUCOMTR-MCNC: 112 MG/DL (ref 70–99)
HCT VFR BLD AUTO: 37.1 %
HGB BLD-MCNC: 12.5 G/DL
IMM GRANULOCYTES # BLD AUTO: 0.04 X10(3) UL (ref 0–1)
IMM GRANULOCYTES NFR BLD: 0.7 %
LYMPHOCYTES # BLD AUTO: 1.09 X10(3) UL (ref 1–4)
LYMPHOCYTES NFR BLD AUTO: 20.1 %
MAGNESIUM SERPL-MCNC: 2 MG/DL (ref 1.6–2.6)
MCH RBC QN AUTO: 30.4 PG (ref 26–34)
MCHC RBC AUTO-ENTMCNC: 33.7 G/DL (ref 31–37)
MCV RBC AUTO: 90.3 FL
MONOCYTES # BLD AUTO: 0.4 X10(3) UL (ref 0.1–1)
MONOCYTES NFR BLD AUTO: 7.4 %
NEUTROPHILS # BLD AUTO: 3.64 X10 (3) UL (ref 1.5–7.7)
NEUTROPHILS # BLD AUTO: 3.64 X10(3) UL (ref 1.5–7.7)
NEUTROPHILS NFR BLD AUTO: 67.4 %
OSMOLALITY SERPL CALC.SUM OF ELEC: 292 MOSM/KG (ref 275–295)
PHOSPHATE SERPL-MCNC: 3.3 MG/DL (ref 2.4–5.1)
PLATELET # BLD AUTO: 284 10(3)UL (ref 150–450)
POTASSIUM SERPL-SCNC: 3.8 MMOL/L (ref 3.5–5.1)
PROT SERPL-MCNC: 5.5 G/DL (ref 5.7–8.2)
RBC # BLD AUTO: 4.11 X10(6)UL
SODIUM SERPL-SCNC: 142 MMOL/L (ref 136–145)
TRIGL SERPL-MCNC: 123 MG/DL (ref 30–149)
WBC # BLD AUTO: 5.4 X10(3) UL (ref 4–11)

## 2024-11-02 PROCEDURE — 99232 SBSQ HOSP IP/OBS MODERATE 35: CPT | Performed by: INTERNAL MEDICINE

## 2024-11-02 PROCEDURE — 99233 SBSQ HOSP IP/OBS HIGH 50: CPT | Performed by: INTERNAL MEDICINE

## 2024-11-02 RX ORDER — LEVOTHYROXINE SODIUM 20 UG/ML
65 INJECTION, SOLUTION INTRAVENOUS DAILY
Status: DISCONTINUED | OUTPATIENT
Start: 2024-11-05 | End: 2024-11-13

## 2024-11-02 NOTE — PROGRESS NOTES
[facility]  [facility]     Dorys Pickens Patient Status:  Inpatient    1950 MRN [id number]   Location [facility] 4W/SW/SE Attending Arpit Hutchins MD   Hosp Day # 4 PCP Cristel Arias MD     Subjective:     Patient resting comfortably in bed. Denied any active complaints at the time of interview.    Objective:    Review of Systems:   ROS completed; pertinent positive and negatives stated in subjective.      Vital signs:  Temp:  [97.6 °F (36.4 °C)] 97.6 °F (36.4 °C)  Pulse:  [74-80] 74  Resp:  [16-18] 16  BP: (124-136)/(57-93) 127/62  SpO2:  [97 %-99 %] 97 %      Physical Exam:    Gen: NAD AO x3  Chest: good air entry CTABL  CVS: normal s1 and s2 RR  Abd: NABS soft NT ND  Neuro: CN 2-12 grossly intact  Ext: no edema in bilateral LE      Diagnostic Data:    Labs:  Recent Labs   Lab 10/29/24  1317 10/30/24  0549 10/31/24  0534 24  0547 24  0628   WBC 14.4* 6.7 6.0 4.0 5.4   HGB 14.4 12.7 13.1 12.3 12.5   MCV 90.6 91.8 90.8 91.7 90.3   .0 190.0 240.0 244.0 284.0       Recent Labs   Lab 10/31/24  0534 24  0547 24  0628   * 100* 105*   BUN 6* <5* 7*   CREATSERUM 0.54* 0.58 0.48*   CA 8.6* 8.4* 8.7   ALB 3.8 3.5 3.3    144 142   K 3.6 4.2 3.8    112 111   CO2 24.0 26.0 25.0   ALKPHO 56 49* 48*   AST 12 9 11   ALT 10 <7* <7*   BILT 0.3 0.3 0.2   TP 6.1 5.5* 5.5*       Estimated Creatinine Clearance: 80.4 mL/min (A) (based on SCr of 0.48 mg/dL (L)).    No results for input(s): \"PTP\", \"INR\" in the last 168 hours.           Imaging: Imaging data reviewed in Epic.    Medications:    pantoprazole  40 mg Intravenous Q12H    cefTRIAXone  2 g Intravenous Q24H    metRONIDAZOLE  500 mg Intravenous Q12H    nicotine  1 patch Transdermal Daily       Assessment & Plan:     Acute duodenal perforation  ?PUD  Imaging reviewed  Paraduodenal fluid collection and possible abscess  Contrast study showing extraluminal  extravastation of the contrast  GI on consult  No plans for EGD at this time  UGI with gastrograffin done  Continue PPI BID and IV abx  Gsx on consult  Continue IVF and IV abx  Continue TPN  Repeat contrast study on Monday - amy advance to liquids if there is no evidence of a leak  Continue IV Ceftriaxone and Flagyl  NPO at this time, TPN via PICC line starting 11/01/2024  PRN pain control  Hypothyroidism  HLD  Resume home meds once tolerating PO intake      Plan of care discussed with patient or family at bedside.      Supplementary Documentation:     Quality:  DVT Prophylaxis: SCDs, resume DVT ppx if okay with Gsx      Estimated date of discharge: TBD  Discharge is dependent on: clinical stability  At this point Ms. Pcikens is expected to be discharge to: home                   Arpit Hutchins MD  Hospitalist    MDM: High, I personally reviewed the available laboratories, imaging including CT. I discussed the case with RN. I ordered laboratories, studies including AM labs.  Medical decision making high, risk is high.         The 21st Century Cures Act makes medical notes like these available to patients in the interest of transparency. Please be advised this is a medical document. Medical documents are intended to carry relevant information, facts as evident, and the clinical opinion of the practitioner. The medical note is intended as peer to peer communication and may appear blunt or direct. It is written in medical language and may contain abbreviations or verbiage that are unfamiliar.

## 2024-11-02 NOTE — PROGRESS NOTES
[facility] Pharmacy Note: Automatic IV Levothyroxine Hold Protocol    Levothyroxine 65mcg IV q24h was ordered by Dr Hutchins   The last oral dose was taken on Tuesday 10/29.  The patient meets criteria to hold IV levothyroxine for 7 days per P&T approved protocol.  Patient status will be monitored daily for eligibility to start oral levothyroxine.  If the patient is not able to be restarted on an oral dose within 7 days of the medication being ordered, IV levothyroxine will be started.  Pharmacy will continue to assess daily.    Thank you,   Christa Ryan, PharmD  11/2/20246:31 PM

## 2024-11-02 NOTE — PLAN OF CARE
Patient is A/Ox4 on room air. Polish speaking mainly. VSS. Up self. Denies pain and nausea. Ambien for sleep. TPN.Q6 sugars. Call light within reach; safety precautions in place.   Problem: Patient/Family Goals  Goal: Patient/Family Short Term Goal  Description: Patient's Short Term Goal:     Interventions:   -   - See additional Care Plan goals for specific interventions  Outcome: Progressing     Problem: RESPIRATORY - ADULT  Goal: Achieves optimal ventilation and oxygenation  Description: INTERVENTIONS:  - Assess for changes in respiratory status  - Assess for changes in mentation and behavior  - Position to facilitate oxygenation and minimize respiratory effort  - Oxygen supplementation based on oxygen saturation or ABGs  - Provide Smoking Cessation handout, if applicable  - Encourage broncho-pulmonary hygiene including cough, deep breathe, Incentive Spirometry  - Assess the need for suctioning and perform as needed  - Assess and instruct to report SOB or any respiratory difficulty  - Respiratory Therapy support as indicated  - Manage/alleviate anxiety  - Monitor for signs/symptoms of CO2 retention  Outcome: Progressing

## 2024-11-02 NOTE — PROGRESS NOTES
[facility]     Gastroenterology Progress Note    Dorys Pickens Patient Status:  Inpatient    1950 MRN [id number]   Location [facility] 4W/SW/SE Attending Arpit Hutchins MD   Hosp Day # 4 PCP Cristel Arias MD       Assessment and Plan:   Contained duodenal perforation  Believed to be due to a complex duodenal diverticulum.  The patient is clinically well without abdominal pain or nausea.  She is having bowel movements.  She has absolutely no abdominal tenderness on examination.  Her clinical status suggests a completely contained/walled off process.  N.p.o. status, antibiotics and TPN are being continued.  Dr. Alvarez is planning for a contrast study on Monday to assess the duodenum.  If no signs of a leak diet as per Dr. Alvarez.    Recommend:  1.  Continue n.p.o. status, antibiotics and TPN for now.  2.  Plan for repeat contrast study on Monday to assess the duodenum for contrast extravasation.  If the leak has sealed diet as per Dr. Alvarez.    GI will follow as needed.  Please call if questions arise.        Subjective:   Seen with the patient's RN who is assisting with translation services.  Fully ambulatory and feels fine.  Denies nausea, abdominal pain, fevers or chills.  Having small-volume bowel movements each time she urinates.  TPN infusing.    Objective:   Patient Vitals for the past 24 hrs:   BP Temp Temp src Pulse Resp SpO2   24 1213 135/62 97.4 °F (36.3 °C) Oral 78 18 99 %   24 0528 127/62 -- -- 74 16 97 %   24 (!) 136/93 97.6 °F (36.4 °C) Oral 80 18 99 %   24 1313 124/57 -- -- 76 16 --     Body mass index is 25.33 kg/m².    General: Patient appears comfortable and in no acute distress.  She does not appear ill.  HEENT:Negative for scleral icterus.  Mucous membranes are moist.  Cardiovascular: Regular rate and rhythm   Lung: Clear to auscultation bilaterally  Abdomen:Non-distended.  Bowel sounds are present.  There is no tenderness to  palpation.  There are no masses appreciated.  Liver and spleen are not palpable.  Skin: No jaundice.  Warm and dry.  Ext: No cyanosis, clubbing or edema is evident.   Neuro- Alert and interactive, and gross movements of extremities normal  Affect:Normal      Results:     Recent Labs   Lab 10/31/24  0534 11/01/24  0547 11/02/24  0628   RBC 4.15 3.84 4.11   HGB 13.1 12.3 12.5   HCT 37.7 35.2 37.1   MCV 90.8 91.7 90.3   MCH 31.6 32.0 30.4   MCHC 34.7 34.9 33.7   RDW 12.4 12.5 12.5   NEPRELIM 4.40 2.12 3.64   WBC 6.0 4.0 5.4   .0 244.0 284.0         Recent Labs   Lab 10/31/24  0534 11/01/24  0547 11/02/24  0628   * 100* 105*   BUN 6* <5* 7*   CREATSERUM 0.54* 0.58 0.48*   CA 8.6* 8.4* 8.7   ALB 3.8 3.5 3.3    144 142   K 3.6 4.2 3.8    112 111   CO2 24.0 26.0 25.0   ALKPHO 56 49* 48*   AST 12 9 11   ALT 10 <7* <7*   BILT 0.3 0.3 0.2   TP 6.1 5.5* 5.5*       No results found for: \"PT\", \"INR\"    XR UGI/ESOPHAGUS SINGLE CONTRAST (CPT=74240)    Result Date: 10/31/2024  CONCLUSION:  1. There is apparent extraluminal extravasation of contrast at the descending portion of the duodenal sweep just distal to the duodenal bulb which appears to be contained and overall measures about 2.3 x 2.4 cm, and it is difficult to tell if this is all  extraluminal contained contrast or if there may be a diverticulum at this site with adjacent extravasation of contrast from a duodenal diverticular rupture.  There is no well-defined mass.  There is mild spasm and poor distention of the descending portion of the duodenal sweep.  Correlate clinically and with endoscopy.  There are two small to moderate adjacent diverticula noted of the transverse portion of the duodenal sweep without evidence of perforation or contrast extravasation. 2. Normal esophagus.  Normal appearance to the stomach which is poorly distended though.  No well-defined mass.    Dictated by (CST): Paul Aranda MD on 10/31/2024 at 1:24 PM     Finalized  by (CST): Paul Aranda MD on 10/31/2024 at 1:38 PM                 Asa Motley MD  11/2/2024

## 2024-11-02 NOTE — PROGRESS NOTES
[facility]  part of [facility]    Progress Note    Dorys Pickens Patient Status:  Inpatient    1950 MRN [id number]   Location [facility] 4W/SW/SE Attending Arpit Hutchins MD   Hosp Day # 4 PCP Cristel Arias MD     Subjective:  Feels ok    Objective/Physical Exam:  General: Alert, orientated x3.  Cooperative.  No apparent distress.  Vital Signs:  Blood pressure 127/62, pulse 74, temperature 97.6 °F (36.4 °C), temperature source Oral, resp. rate 16, height 5' 1.42\" (1.56 m), weight 135 lb 14.4 oz (61.6 kg), SpO2 97%, not currently breastfeeding.  Abdomen:  Soft, neg  Labs:  Lab Results   Component Value Date    WBC 5.4 2024    HGB 12.5 2024    HCT 37.1 2024    .0 2024    CREATSERUM 0.48 (L) 2024    BUN 7 (L) 2024     2024    K 3.8 2024     2024    CO2 25.0 2024     (H) 2024    CA 8.7 2024    ALB 3.3 2024    ALKPHO 48 (L) 2024    BILT 0.2 2024    TP 5.5 (L) 2024    AST 11 2024    ALT <7 (L) 2024    T4F 1.5 2024    TSH 2.029 2024    LIP 29 10/29/2024    MG 2.0 2024    PHOS 3.3 2024       Imaging:  XR UGI/ESOPHAGUS SINGLE CONTRAST (CPT=74240)    Result Date: 10/31/2024  PROCEDURE: XR UGI/ESOPHAGUS SINGLE CONTRAST (CPT=74240)  COMPARISON: [facility], CT ABDOMEN + PELVIS (CONTRAST ONLY) (CPT=74177), 10/29/2024, 3:22 PM.   INDICATIONS: Contained perforation of the descending duodenum on recent CT scan..  TECHNIQUE:   A single contrast esophagram and upper gastrointestinal series was performed with fluoroscopy in the usual manner.  The patient was inadvertently given a moderate amount of thin barium for this examination as opposed to water-soluble Gastrografin contrast.   FLUOROSCOPY IMAGES OBTAINED:  22 FLUOROSCOPY TIME:  2.6 minute RADIATION DOSE (Dose Area Product):  3830.1-uGy*m^2 FINDINGS:  ESOPHAGUS  STRUCTURE:   Normal.  No visible obstruction, stricture or dilation.  MOTILITY:   Normal.  HERNIA:   None visible.  REFLUX:   None visible.  OTHER:   Negative.   UPPER GI STOMACH:   Poorly distended stomach because of lack of air contrast technique.  No well-defined mass or outlet obstruction. DUODENUM:   There is apparent extraluminal extravasation of contrast at the descending portion of the duodenum where there is a 2.3 x 2.4 cm collection of contrast which extends beyond the lumen of the duodenum, it is difficult to tell if this is all free contained extravasated contrast at a site of perforation or if there may be an underlying diverticulum with associated adjacent extravasation from a duodenal diverticulum perforation.  There is mild spasm poor distention of the descending portion of  the duodenal sweep.  There are 2 moderate-sized diverticula identified in transverse portion the duodenum with one measuring 2.9 x 3.1 cm, and adjacent 1 measuring 1.2 x 1.4 cm.  There does not appear to be extravasation or perforation from the 2 adjacent diverticula of the duodenal sweep. OTHER:   Negative.              CONCLUSION:  1. There is apparent extraluminal extravasation of contrast at the descending portion of the duodenal sweep just distal to the duodenal bulb which appears to be contained and overall measures about 2.3 x 2.4 cm, and it is difficult to tell if this is all  extraluminal contained contrast or if there may be a diverticulum at this site with adjacent extravasation of contrast from a duodenal diverticular rupture.  There is no well-defined mass.  There is mild spasm and poor distention of the descending portion of the duodenal sweep.  Correlate clinically and with endoscopy.  There are two small to moderate adjacent diverticula noted of the transverse portion of the duodenal sweep without evidence of perforation or contrast extravasation. 2. Normal esophagus.  Normal appearance to the stomach which is  poorly distended though.  No well-defined mass.    Dictated by (CST): Paul Aranda MD on 10/31/2024 at 1:24 PM     Finalized by (CST): Paul Aranda MD on 10/31/2024 at 1:38 PM          CT ABDOMEN+PELVIS(CONTRAST ONLY)(CPT=74177)    Result Date: 10/29/2024  PROCEDURE: CT ABDOMEN + PELVIS (CONTRAST ONLY) (CPT=74177)  COMPARISON: None.  INDICATIONS: Epigastric pain, nausea, and vomiting.  TECHNIQUE: CT images of the abdomen and pelvis were obtained with non-ionic intravenous contrast material.  Automated exposure control for dose reduction was used. Adjustment of the mA and/or kV was done based on the patient's size. Use of iterative reconstruction technique for dose reduction was used.  Dose information is transmitted to the ACR (American College of Radiology) NRDR (National Radiology Data Registry) which includes the Dose Index Registry.  FINDINGS:  LIVER: Too small to characterize hypoattenuating hepatic dome lesion. BILIARY: No evidence for biliary dilatation. Post cholecystectomy. SPLEEN: Normal.  PANCREAS: Normal.  ADRENALS: Normal. KIDNEYS: Normal. AORTA/VASCULAR: Atherosclerotic vascular calcification including coronary artery calcification. No aneurysm or dissection.  LYMPHADENOPATHY: None. GI/MESENTERY: Multiple duodenal diverticula with a contained perforation of the 2nd portion of the duodenum with surrounding inflammatory changes including small para duodenal fluid collections measuring up to 1.6 x 3.6 cm Normal appendix.  Colonic diverticulosis.  No obstruction, bowel wall thickening, or mesenteric mass. ABDOMINAL WALL: Normal.  No mass or hernia.  URINARY BLADDER: Normal. ASCITES:   None. PELVIC ORGANS: No suspect pelvic mass. BONES: Mild-to-moderate chronic superior endplate compression fracture T8 and T10.  Minimal chronic anterior wedging of T9 and T11.  Schmorl's node associated with mild chronic superior endplate compression of L4.  No suspect bone lesion. LUNG BASES: Normal.  No visible  pulmonary or pleural disease.  OTHER: Negative.          CONCLUSION:  1. Contained perforation 2nd portion of duodenum either related to perforated diverticulitis or ulcer.  Small para duodenal fluid collection measuring approximately 3 x 3.6 x 1.6 cm. 2. Multiple duodenal diverticula. 3. Moderate coronary artery calcification.  4. Findings were called to Dr. Lara.    Dictated by (CST): Dustin Hughes MD on 10/29/2024 at 3:46 PM     Finalized by (CST): Dustin Hughes MD on 10/29/2024 at 4:00 PM          XR CHEST AP PORTABLE  (CPT=71045)    Result Date: 10/29/2024  PROCEDURE: XR CHEST AP PORTABLE  (CPT=71045) TIME: 13:18.   COMPARISON: [facility], XR CHEST AP PORTABLE (CPT=71045), 6/20/2024, 1:27 PM.  [facility], XR CHEST PA + LAT CHEST (CPT=71046), 1/08/2024, 11:33 AM.  INDICATIONS: Epigastric pain, nausea, and vomitting x4 days. Diarrhea x1 day.  TECHNIQUE:   Single view.   FINDINGS:  CARDIAC/VASC: The cardiomediastinal silhouette is unchanged in size.  There is atherosclerotic calcification of the thoracic aorta. MEDIAST/JENNIFER:   No visible mass or adenopathy. LUNGS/PLEURA: Small bibasilar opacities.  No pleural effusion.  No pneumothorax. BONES: Multilevel degenerative changes of the thoracic spine.  The thoracic compression fracture deformities were better demonstrated the prior radiograph dated 01/08/2024. OTHER: Negative.          CONCLUSION:   Small bibasilar opacities, which may reflect atelectasis with or without superimposed pneumonia.     Dictated by (CST): Lalo King MD on 10/29/2024 at 1:33 PM     Finalized by (CST): Lalo King MD on 10/29/2024 at 1:35 PM            Assessment/Plan:  Patient Active Problem List   Diagnosis    Osteoporosis    Contusion of right chest wall    Perforated diverticulum of duodenum    Duodenal ulcer with perforation (HCC)    Perforated duodenal ulcer (HCC)    Small bowel diverticular disease   Doing well  On TPN  Monday to repeat  contrast study    If no evidence of leak then start liq    DAVID TREVIÑO MD  11/2/2024  9:40 AM

## 2024-11-03 LAB
ALBUMIN SERPL-MCNC: 3.7 G/DL (ref 3.2–4.8)
ALBUMIN/GLOB SERPL: 1.7 {RATIO} (ref 1–2)
ALP LIVER SERPL-CCNC: 48 U/L
ALT SERPL-CCNC: 15 U/L
ANION GAP SERPL CALC-SCNC: 6 MMOL/L (ref 0–18)
ANION GAP SERPL CALC-SCNC: 6 MMOL/L (ref 0–18)
AST SERPL-CCNC: 28 U/L (ref ?–34)
BASOPHILS # BLD AUTO: 0.04 X10(3) UL (ref 0–0.2)
BASOPHILS NFR BLD AUTO: 0.6 %
BETA STREP GRP A SCREEN: NEGATIVE
BILIRUB SERPL-MCNC: 0.2 MG/DL (ref 0.2–1.1)
BUN BLD-MCNC: 11 MG/DL (ref 9–23)
BUN BLD-MCNC: 11 MG/DL (ref 9–23)
BUN/CREAT SERPL: 21.6 (ref 10–20)
BUN/CREAT SERPL: 21.6 (ref 10–20)
CALCIUM BLD-MCNC: 9 MG/DL (ref 8.7–10.4)
CALCIUM BLD-MCNC: 9 MG/DL (ref 8.7–10.4)
CHLORIDE SERPL-SCNC: 111 MMOL/L (ref 98–112)
CHLORIDE SERPL-SCNC: 111 MMOL/L (ref 98–112)
CO2 SERPL-SCNC: 26 MMOL/L (ref 21–32)
CO2 SERPL-SCNC: 26 MMOL/L (ref 21–32)
CREAT BLD-MCNC: 0.51 MG/DL
CREAT BLD-MCNC: 0.51 MG/DL
DEPRECATED RDW RBC AUTO: 41 FL (ref 35.1–46.3)
EGFRCR SERPLBLD CKD-EPI 2021: 99 ML/MIN/1.73M2 (ref 60–?)
EGFRCR SERPLBLD CKD-EPI 2021: 99 ML/MIN/1.73M2 (ref 60–?)
EOSINOPHIL # BLD AUTO: 0.18 X10(3) UL (ref 0–0.7)
EOSINOPHIL NFR BLD AUTO: 2.7 %
ERYTHROCYTE [DISTWIDTH] IN BLOOD BY AUTOMATED COUNT: 12.5 % (ref 11–15)
GLOBULIN PLAS-MCNC: 2.2 G/DL (ref 2–3.5)
GLUCOSE BLD-MCNC: 93 MG/DL (ref 70–99)
GLUCOSE BLD-MCNC: 93 MG/DL (ref 70–99)
GLUCOSE BLDC GLUCOMTR-MCNC: 119 MG/DL (ref 70–99)
GLUCOSE BLDC GLUCOMTR-MCNC: 120 MG/DL (ref 70–99)
GLUCOSE BLDC GLUCOMTR-MCNC: 125 MG/DL (ref 70–99)
HCT VFR BLD AUTO: 37.7 %
HGB BLD-MCNC: 13.4 G/DL
IMM GRANULOCYTES # BLD AUTO: 0.06 X10(3) UL (ref 0–1)
IMM GRANULOCYTES NFR BLD: 0.9 %
LYMPHOCYTES # BLD AUTO: 1.12 X10(3) UL (ref 1–4)
LYMPHOCYTES NFR BLD AUTO: 16.9 %
MAGNESIUM SERPL-MCNC: 2 MG/DL (ref 1.6–2.6)
MCH RBC QN AUTO: 31.6 PG (ref 26–34)
MCHC RBC AUTO-ENTMCNC: 35.5 G/DL (ref 31–37)
MCV RBC AUTO: 88.9 FL
MONOCYTES # BLD AUTO: 0.47 X10(3) UL (ref 0.1–1)
MONOCYTES NFR BLD AUTO: 7.1 %
NEUTROPHILS # BLD AUTO: 4.77 X10 (3) UL (ref 1.5–7.7)
NEUTROPHILS # BLD AUTO: 4.77 X10(3) UL (ref 1.5–7.7)
NEUTROPHILS NFR BLD AUTO: 71.8 %
OSMOLALITY SERPL CALC.SUM OF ELEC: 295 MOSM/KG (ref 275–295)
OSMOLALITY SERPL CALC.SUM OF ELEC: 295 MOSM/KG (ref 275–295)
PHOSPHATE SERPL-MCNC: 3.6 MG/DL (ref 2.4–5.1)
PLATELET # BLD AUTO: 283 10(3)UL (ref 150–450)
POTASSIUM SERPL-SCNC: 4 MMOL/L (ref 3.5–5.1)
POTASSIUM SERPL-SCNC: 4 MMOL/L (ref 3.5–5.1)
PROT SERPL-MCNC: 5.9 G/DL (ref 5.7–8.2)
RBC # BLD AUTO: 4.24 X10(6)UL
SODIUM SERPL-SCNC: 143 MMOL/L (ref 136–145)
SODIUM SERPL-SCNC: 143 MMOL/L (ref 136–145)
WBC # BLD AUTO: 6.6 X10(3) UL (ref 4–11)

## 2024-11-03 PROCEDURE — 99233 SBSQ HOSP IP/OBS HIGH 50: CPT | Performed by: INTERNAL MEDICINE

## 2024-11-03 RX ORDER — FLUCONAZOLE 150 MG/1
150 TABLET ORAL ONCE
Status: COMPLETED | OUTPATIENT
Start: 2024-11-03 | End: 2024-11-03

## 2024-11-03 NOTE — PROGRESS NOTES
[facility]  part of [facility]    Progress Note    Dorys Pickens Patient Status:  Inpatient    1950 MRN [id number]   Location [facility] 4W/SW/SE Attending Arpit Hutchins MD   Hosp Day # 5 PCP Cristel Arias MD     Subjective:  Feels ok     Objective/Physical Exam:  General: Alert, orientated x3.  Cooperative.  No apparent distress.  Vital Signs:  Blood pressure 133/69, pulse 83, temperature 97.8 °F (36.6 °C), temperature source Oral, resp. rate 18, height 5' 1.42\" (1.56 m), weight 135 lb 14.4 oz (61.6 kg), SpO2 96%, not currently breastfeeding.  Abdomen:  Soft, non-distended, non-tender  Labs:  Lab Results   Component Value Date    WBC 6.6 2024    HGB 13.4 2024    HCT 37.7 2024    .0 2024    CREATSERUM 0.51 (L) 2024    CREATSERUM 0.51 (L) 2024    BUN 11 2024    BUN 11 2024     2024     2024    K 4.0 2024    K 4.0 2024     2024     2024    CO2 26.0 2024    CO2 26.0 2024    GLU 93 2024    GLU 93 2024    CA 9.0 2024    CA 9.0 2024    ALB 3.7 2024    ALKPHO 48 (L) 2024    BILT 0.2 2024    TP 5.9 2024    AST 28 2024    ALT 15 2024    T4F 1.5 2024    TSH 2.029 2024    LIP 29 10/29/2024    MG 2.0 2024    PHOS 3.6 2024       Imaging:  XR UGI/ESOPHAGUS SINGLE CONTRAST (CPT=74240)    Result Date: 10/31/2024  PROCEDURE: XR UGI/ESOPHAGUS SINGLE CONTRAST (CPT=74240)  COMPARISON: [facility], CT ABDOMEN + PELVIS (CONTRAST ONLY) (CPT=74177), 10/29/2024, 3:22 PM.   INDICATIONS: Contained perforation of the descending duodenum on recent CT scan..  TECHNIQUE:   A single contrast esophagram and upper gastrointestinal series was performed with fluoroscopy in the usual manner.  The patient was inadvertently given a moderate amount of thin barium for this examination as  opposed to water-soluble Gastrografin contrast.   FLUOROSCOPY IMAGES OBTAINED:  22 FLUOROSCOPY TIME:  2.6 minute RADIATION DOSE (Dose Area Product):  3830.1-uGy*m^2 FINDINGS:  ESOPHAGUS STRUCTURE:   Normal.  No visible obstruction, stricture or dilation.  MOTILITY:   Normal.  HERNIA:   None visible.  REFLUX:   None visible.  OTHER:   Negative.   UPPER GI STOMACH:   Poorly distended stomach because of lack of air contrast technique.  No well-defined mass or outlet obstruction. DUODENUM:   There is apparent extraluminal extravasation of contrast at the descending portion of the duodenum where there is a 2.3 x 2.4 cm collection of contrast which extends beyond the lumen of the duodenum, it is difficult to tell if this is all free contained extravasated contrast at a site of perforation or if there may be an underlying diverticulum with associated adjacent extravasation from a duodenal diverticulum perforation.  There is mild spasm poor distention of the descending portion of  the duodenal sweep.  There are 2 moderate-sized diverticula identified in transverse portion the duodenum with one measuring 2.9 x 3.1 cm, and adjacent 1 measuring 1.2 x 1.4 cm.  There does not appear to be extravasation or perforation from the 2 adjacent diverticula of the duodenal sweep. OTHER:   Negative.              CONCLUSION:  1. There is apparent extraluminal extravasation of contrast at the descending portion of the duodenal sweep just distal to the duodenal bulb which appears to be contained and overall measures about 2.3 x 2.4 cm, and it is difficult to tell if this is all  extraluminal contained contrast or if there may be a diverticulum at this site with adjacent extravasation of contrast from a duodenal diverticular rupture.  There is no well-defined mass.  There is mild spasm and poor distention of the descending portion of the duodenal sweep.  Correlate clinically and with endoscopy.  There are two small to moderate adjacent  diverticula noted of the transverse portion of the duodenal sweep without evidence of perforation or contrast extravasation. 2. Normal esophagus.  Normal appearance to the stomach which is poorly distended though.  No well-defined mass.    Dictated by (CST): Paul Aranda MD on 10/31/2024 at 1:24 PM     Finalized by (CST): Paul Aranda MD on 10/31/2024 at 1:38 PM          CT ABDOMEN+PELVIS(CONTRAST ONLY)(CPT=74177)    Result Date: 10/29/2024  PROCEDURE: CT ABDOMEN + PELVIS (CONTRAST ONLY) (CPT=74177)  COMPARISON: None.  INDICATIONS: Epigastric pain, nausea, and vomiting.  TECHNIQUE: CT images of the abdomen and pelvis were obtained with non-ionic intravenous contrast material.  Automated exposure control for dose reduction was used. Adjustment of the mA and/or kV was done based on the patient's size. Use of iterative reconstruction technique for dose reduction was used.  Dose information is transmitted to the ACR (American College of Radiology) NRDR (National Radiology Data Registry) which includes the Dose Index Registry.  FINDINGS:  LIVER: Too small to characterize hypoattenuating hepatic dome lesion. BILIARY: No evidence for biliary dilatation. Post cholecystectomy. SPLEEN: Normal.  PANCREAS: Normal.  ADRENALS: Normal. KIDNEYS: Normal. AORTA/VASCULAR: Atherosclerotic vascular calcification including coronary artery calcification. No aneurysm or dissection.  LYMPHADENOPATHY: None. GI/MESENTERY: Multiple duodenal diverticula with a contained perforation of the 2nd portion of the duodenum with surrounding inflammatory changes including small para duodenal fluid collections measuring up to 1.6 x 3.6 cm Normal appendix.  Colonic diverticulosis.  No obstruction, bowel wall thickening, or mesenteric mass. ABDOMINAL WALL: Normal.  No mass or hernia.  URINARY BLADDER: Normal. ASCITES:   None. PELVIC ORGANS: No suspect pelvic mass. BONES: Mild-to-moderate chronic superior endplate compression fracture T8 and T10.   Minimal chronic anterior wedging of T9 and T11.  Schmorl's node associated with mild chronic superior endplate compression of L4.  No suspect bone lesion. LUNG BASES: Normal.  No visible pulmonary or pleural disease.  OTHER: Negative.          CONCLUSION:  1. Contained perforation 2nd portion of duodenum either related to perforated diverticulitis or ulcer.  Small para duodenal fluid collection measuring approximately 3 x 3.6 x 1.6 cm. 2. Multiple duodenal diverticula. 3. Moderate coronary artery calcification.  4. Findings were called to Dr. Lara.    Dictated by (CST): Dustin Hughes MD on 10/29/2024 at 3:46 PM     Finalized by (CST): Dustin Hughes MD on 10/29/2024 at 4:00 PM          XR CHEST AP PORTABLE  (CPT=71045)    Result Date: 10/29/2024  PROCEDURE: XR CHEST AP PORTABLE  (CPT=71045) TIME: 13:18.   COMPARISON: [facility], XR CHEST AP PORTABLE (CPT=71045), 6/20/2024, 1:27 PM.  [facility], XR CHEST PA + LAT CHEST (CPT=71046), 1/08/2024, 11:33 AM.  INDICATIONS: Epigastric pain, nausea, and vomitting x4 days. Diarrhea x1 day.  TECHNIQUE:   Single view.   FINDINGS:  CARDIAC/VASC: The cardiomediastinal silhouette is unchanged in size.  There is atherosclerotic calcification of the thoracic aorta. MEDIAST/JENNIFER:   No visible mass or adenopathy. LUNGS/PLEURA: Small bibasilar opacities.  No pleural effusion.  No pneumothorax. BONES: Multilevel degenerative changes of the thoracic spine.  The thoracic compression fracture deformities were better demonstrated the prior radiograph dated 01/08/2024. OTHER: Negative.          CONCLUSION:   Small bibasilar opacities, which may reflect atelectasis with or without superimposed pneumonia.     Dictated by (CST): Lalo King MD on 10/29/2024 at 1:33 PM     Finalized by (CST): Lalo King MD on 10/29/2024 at 1:35 PM            Assessment/Plan:  Patient Active Problem List   Diagnosis    Osteoporosis    Contusion of right chest wall     Perforated diverticulum of duodenum    Duodenal ulcer with perforation (HCC)    Perforated duodenal ulcer (HCC)    Small bowel diverticular disease   Doing well   contrast study tomorrow   if neg then clears    DAVID TREVIÑO MD  11/3/2024  9:15 AM

## 2024-11-03 NOTE — PROGRESS NOTES
[facility]     Dorys Pickens Patient Status:  Inpatient    1950 MRN [id number]   Location [facility] 4W/SW/SE Attending Arpit Hutchins MD   Hosp Day # 5 PCP Cristel Arias MD     Subjective:     Patient resting comfortably in bed. Denied any active complaints at the time of interview.  No overnight events reported by the nursing staff.     Objective:    Review of Systems:   ROS completed; pertinent positive and negatives stated in subjective.      Vital signs:  Temp:  [97.8 °F (36.6 °C)-98.2 °F (36.8 °C)] 98.2 °F (36.8 °C)  Pulse:  [77-83] 81  Resp:  [18] 18  BP: (110-133)/(64-69) 131/64  SpO2:  [96 %-98 %] 96 %      Physical Exam:    Gen: NAD AO x3  Chest: good air entry CTABL  CVS: normal s1 and s2 RR  Abd: NABS soft NT ND  Neuro: CN 2-12 grossly intact  Ext: no edema in bilateral LE      Diagnostic Data:    Labs:  Recent Labs   Lab 10/30/24  0549 10/31/24  0534 24  0547 24  0628 24  0436   WBC 6.7 6.0 4.0 5.4 6.6   HGB 12.7 13.1 12.3 12.5 13.4   MCV 91.8 90.8 91.7 90.3 88.9   .0 240.0 244.0 284.0 283.0       Recent Labs   Lab 24  0547 24  0628 24  0436   * 105* 93  93   BUN <5* 7* 11  11   CREATSERUM 0.58 0.48* 0.51*  0.51*   CA 8.4* 8.7 9.0  9.0   ALB 3.5 3.3 3.7    142 143  143   K 4.2 3.8 4.0  4.0    111 111  111   CO2 26.0 25.0 26.0  26.0   ALKPHO 49* 48* 48*   AST 9 11 28   ALT <7* <7* 15   BILT 0.3 0.2 0.2   TP 5.5* 5.5* 5.9       Estimated Creatinine Clearance: 75.7 mL/min (A) (based on SCr of 0.51 mg/dL (L)).    No results for input(s): \"PTP\", \"INR\" in the last 168 hours.           Imaging: Imaging data reviewed in Epic.    Medications:    [START ON 2024] levothyroxine  65 mcg Intravenous Daily    pantoprazole  40 mg Intravenous Q12H    cefTRIAXone  2 g Intravenous Q24H    metRONIDAZOLE  500 mg Intravenous Q12H    nicotine  1 patch Transdermal  Daily       Assessment & Plan:     Acute duodenal perforation  ?PUD  Imaging reviewed  Paraduodenal fluid collection and possible abscess  Contrast study showing extraluminal extravastation of the contrast  GI on consult  No plans for EGD at this time  UGI with gastrograffin done  Continue PPI BID and IV abx  Gsx on consult  Continue IVF and IV abx  Continue TPN  Repeat contrast study in a.m.  If negative may start the patient on CLD  Continue IV Ceftriaxone and Flagyl  NPO at this time, TPN via PICC line starting 11/01/2024  PRN pain control  Hypothyroidism  HLD  Resume home meds once tolerating PO intake      Plan of care discussed with patient or family at bedside.      Supplementary Documentation:     Quality:  DVT Prophylaxis: SCDs, resume DVT ppx if okay with Gsx      Estimated date of discharge: TBD  Discharge is dependent on: clinical stability  At this point Ms. Pickens is expected to be discharge to: home                   Arpit Hutchins MD  Hospitalist    MDM: High, I personally reviewed the available laboratories, imaging including CT. I discussed the case with RN. I ordered laboratories, studies including AM labs.  Medical decision making high, risk is high.         The 21st Century Cures Act makes medical notes like these available to patients in the interest of transparency. Please be advised this is a medical document. Medical documents are intended to carry relevant information, facts as evident, and the clinical opinion of the practitioner. The medical note is intended as peer to peer communication and may appear blunt or direct. It is written in medical language and may contain abbreviations or verbiage that are unfamiliar.

## 2024-11-03 NOTE — PLAN OF CARE
Problem: Patient Centered Care  Goal: Patient preferences are identified and integrated in the patient's plan of care  Description: Interventions:  - What would you like us to know as we care for you? Polish speaking .   - Provide timely, complete, and accurate information to patient/family  - Incorporate patient and family knowledge, values, beliefs, and cultural backgrounds into the planning and delivery of care  - Encourage patient/family to participate in care and decision-making at the level they choose  - Honor patient and family perspectives and choices  Outcome: Progressing   Patient alert and oriented X 4,  denies new complaints, no c/o pain, NPO. Right Picc line in place, TPN infusing. Continue IV Abx. Patient has been frequently ambulating in hallway, tolerating activity well,  fall precautions in place.

## 2024-11-03 NOTE — PLAN OF CARE
Problem: Patient Centered Care  Goal: Patient preferences are identified and integrated in the patient's plan of care  Description: Interventions:  - Provide timely, complete, and accurate information to patient/family  - Incorporate patient and family knowledge, values, beliefs, and cultural backgrounds into the planning and delivery of care  - Encourage patient/family to participate in care and decision-making at the level they choose  - Honor patient and family perspectives and choices  Outcome: Progressing   Patient alert and oriented X 4 , NPO sips with meds and ice chips. Patient denies abdominal pain, reports mild pain to right hip, states pain is only when she is lying in the bed. Right PICC line in place TPN has been  infusing, continue IV Abx. Patient has been frequently ambulating in hallway, tolerating activity well, fall precautions in place. Plan for upper GI X ray tomorrow.

## 2024-11-04 ENCOUNTER — APPOINTMENT (OUTPATIENT)
Dept: GENERAL RADIOLOGY | Facility: HOSPITAL | Age: 74
End: 2024-11-04
Attending: SPECIALIST
Payer: MEDICAID

## 2024-11-04 LAB
ALBUMIN SERPL-MCNC: 3.8 G/DL (ref 3.2–4.8)
ALBUMIN/GLOB SERPL: 1.7 {RATIO} (ref 1–2)
ALP LIVER SERPL-CCNC: 50 U/L
ALT SERPL-CCNC: 38 U/L
ANION GAP SERPL CALC-SCNC: 6 MMOL/L (ref 0–18)
ANION GAP SERPL CALC-SCNC: 6 MMOL/L (ref 0–18)
AST SERPL-CCNC: 65 U/L (ref ?–34)
BASOPHILS # BLD AUTO: 0.06 X10(3) UL (ref 0–0.2)
BASOPHILS NFR BLD AUTO: 0.7 %
BILIRUB SERPL-MCNC: 0.2 MG/DL (ref 0.2–1.1)
BUN BLD-MCNC: 11 MG/DL (ref 9–23)
BUN BLD-MCNC: 11 MG/DL (ref 9–23)
BUN/CREAT SERPL: 20.4 (ref 10–20)
BUN/CREAT SERPL: 20.4 (ref 10–20)
CALCIUM BLD-MCNC: 9.2 MG/DL (ref 8.7–10.4)
CALCIUM BLD-MCNC: 9.2 MG/DL (ref 8.7–10.4)
CHLORIDE SERPL-SCNC: 110 MMOL/L (ref 98–112)
CHLORIDE SERPL-SCNC: 110 MMOL/L (ref 98–112)
CO2 SERPL-SCNC: 26 MMOL/L (ref 21–32)
CO2 SERPL-SCNC: 26 MMOL/L (ref 21–32)
CREAT BLD-MCNC: 0.54 MG/DL
CREAT BLD-MCNC: 0.54 MG/DL
DEPRECATED RDW RBC AUTO: 42.2 FL (ref 35.1–46.3)
EGFRCR SERPLBLD CKD-EPI 2021: 97 ML/MIN/1.73M2 (ref 60–?)
EGFRCR SERPLBLD CKD-EPI 2021: 97 ML/MIN/1.73M2 (ref 60–?)
EOSINOPHIL # BLD AUTO: 0.25 X10(3) UL (ref 0–0.7)
EOSINOPHIL NFR BLD AUTO: 3 %
ERYTHROCYTE [DISTWIDTH] IN BLOOD BY AUTOMATED COUNT: 12.7 % (ref 11–15)
GLOBULIN PLAS-MCNC: 2.2 G/DL (ref 2–3.5)
GLUCOSE BLD-MCNC: 107 MG/DL (ref 70–99)
GLUCOSE BLD-MCNC: 107 MG/DL (ref 70–99)
GLUCOSE BLDC GLUCOMTR-MCNC: 125 MG/DL (ref 70–99)
HCT VFR BLD AUTO: 38.4 %
HGB BLD-MCNC: 13.4 G/DL
IMM GRANULOCYTES # BLD AUTO: 0.07 X10(3) UL (ref 0–1)
IMM GRANULOCYTES NFR BLD: 0.9 %
LYMPHOCYTES # BLD AUTO: 1.45 X10(3) UL (ref 1–4)
LYMPHOCYTES NFR BLD AUTO: 17.6 %
MAGNESIUM SERPL-MCNC: 2.1 MG/DL (ref 1.6–2.6)
MCH RBC QN AUTO: 31.6 PG (ref 26–34)
MCHC RBC AUTO-ENTMCNC: 34.9 G/DL (ref 31–37)
MCV RBC AUTO: 90.6 FL
MONOCYTES # BLD AUTO: 0.48 X10(3) UL (ref 0.1–1)
MONOCYTES NFR BLD AUTO: 5.8 %
NEUTROPHILS # BLD AUTO: 5.91 X10 (3) UL (ref 1.5–7.7)
NEUTROPHILS # BLD AUTO: 5.91 X10(3) UL (ref 1.5–7.7)
NEUTROPHILS NFR BLD AUTO: 72 %
OSMOLALITY SERPL CALC.SUM OF ELEC: 294 MOSM/KG (ref 275–295)
OSMOLALITY SERPL CALC.SUM OF ELEC: 294 MOSM/KG (ref 275–295)
PHOSPHATE SERPL-MCNC: 3.8 MG/DL (ref 2.4–5.1)
PLATELET # BLD AUTO: 317 10(3)UL (ref 150–450)
POTASSIUM SERPL-SCNC: 4.1 MMOL/L (ref 3.5–5.1)
POTASSIUM SERPL-SCNC: 4.1 MMOL/L (ref 3.5–5.1)
PROT SERPL-MCNC: 6 G/DL (ref 5.7–8.2)
RBC # BLD AUTO: 4.24 X10(6)UL
SODIUM SERPL-SCNC: 142 MMOL/L (ref 136–145)
SODIUM SERPL-SCNC: 142 MMOL/L (ref 136–145)
WBC # BLD AUTO: 8.2 X10(3) UL (ref 4–11)

## 2024-11-04 PROCEDURE — 74018 RADEX ABDOMEN 1 VIEW: CPT | Performed by: SPECIALIST

## 2024-11-04 PROCEDURE — 74240 X-RAY XM UPR GI TRC 1CNTRST: CPT | Performed by: SPECIALIST

## 2024-11-04 PROCEDURE — 99233 SBSQ HOSP IP/OBS HIGH 50: CPT | Performed by: INTERNAL MEDICINE

## 2024-11-04 NOTE — PAYOR COMM NOTE
11/3 & 11/4  CONTINUED STAY REVIEW    Payor: [facility]  Subscriber #:  [id number]  Authorization Number: [id number]    Admit date: 10/29/24  Admit time:  5:21 PM      MEDICATIONS ADMINISTERED IN LAST 1 DAY:  adult 3 in 1 TPN       Date Action Dose Route User    11/3/2024 2254 New Bag (none) Intravenous Opal Hemphill RN          cefTRIAXone (Rocephin) 2 g in sodium chloride 0.9% 100 mL IVPB-ADDV       Date Action Dose Route User    11/3/2024 1721 New Bag 2 g Intravenous Dorys Matta RN          metRONIDAZOLE in sodium chloride 0.79% (Flagyl) 5 mg/mL IVPB premix 500 mg       Date Action Dose Route User    11/4/2024 0550 New Bag 500 mg Intravenous Opal Hemphill RN    11/3/2024 1843 New Bag 500 mg Intravenous Dorys Matta RN          nicotine (Nicoderm CQ) 21 MG/24HR patch 1 patch       Date Action Dose Route User    11/4/2024 0840 Patch Applied 1 patch Transdermal (Left Upper Arm) Dorys Matta RN          pantoprazole (Protonix) 40 mg in sodium chloride 0.9% PF 10 mL IV push       Date Action Dose Route User    11/4/2024 0550 Given 40 mg Intravenous Opal Hemphill RN    11/3/2024 1717 Given 40 mg Intravenous Dorys Matta RN          phenol (Chloraseptic) 1.4 % oral liquid spray       Date Action Dose Route User    11/3/2024 2307 Given 3 spray Oral Opal Hemphill RN          zolpidem (Ambien) tab 5 mg       Date Action Dose Route User    11/3/2024 2254 Given 5 mg Oral Opal Hemphill RN            Vitals (last day)       Date/Time Temp Pulse Resp BP SpO2 Weight O2 Device O2 Flow Rate (L/min) Who    11/04/24 1201 98.2 °F (36.8 °C) 82 18 126/61 94 % -- None (Room air) -- JT    11/04/24 0334 98.3 °F (36.8 °C) 75 16 144/77 98 % -- None (Room air) -- KJ    11/03/24 1923 98.2 °F (36.8 °C) 83 18 133/59 95 % -- None (Room air) -- KJ    11/03/24 1700 -- -- -- -- -- 132 lb 9.6 oz (60.1 kg) -- -- AJ    11/03/24 1207 98.2 °F (36.8 °C) 81 18 131/64 96 % -- None (Room air) --  MJ    11/03/24 0418 97.8 °F (36.6 °C) 83 18 133/69 96 % -- None (Room air) -- SR           11/3 GENERAL SURGERY NOTE    Subjective:  Feels ok      Objective/Physical Exam:  General: Alert, orientated x3.  Cooperative.  No apparent distress.  Vital Signs:  Blood pressure 133/69, pulse 83, temperature 97.8 °F (36.6 °C), temperature source Oral, resp. rate 18, height 5' 1.42\" (1.56 m), weight 135 lb 14.4 oz (61.6 kg), SpO2 96%, not currently breastfeeding.  Abdomen:  Soft, non-distended, non-tender    Labs:        Lab Results   Component Value Date     WBC 6.6 11/03/2024     HGB 13.4 11/03/2024     HCT 37.7 11/03/2024     .0 11/03/2024     CREATSERUM 0.51 (L) 11/03/2024     CREATSERUM 0.51 (L) 11/03/2024     BUN 11 11/03/2024     BUN 11 11/03/2024      11/03/2024      11/03/2024     K 4.0 11/03/2024     K 4.0 11/03/2024      11/03/2024      11/03/2024     CO2 26.0 11/03/2024     CO2 26.0 11/03/2024     GLU 93 11/03/2024     GLU 93 11/03/2024     CA 9.0 11/03/2024     CA 9.0 11/03/2024     ALB 3.7 11/03/2024     ALKPHO 48 (L) 11/03/2024     BILT 0.2 11/03/2024     TP 5.9 11/03/2024     AST 28 11/03/2024     ALT 15 11/03/2024        Assessment/Plan:      Patient Active Problem List   Diagnosis    Osteoporosis    Contusion of right chest wall    Perforated diverticulum of duodenum    Duodenal ulcer with perforation (HCC)    Perforated duodenal ulcer (HCC)    Small bowel diverticular disease   Doing well   contrast study tomorrow   if neg then clears    11/3 HOSPITALIST NOTE    Subjective:  Patient resting comfortably in bed. Denied any active complaints at the time of interview.  No overnight events reported by the nursing staff.         Objective:  Review of Systems:   ROS completed; pertinent positive and negatives stated in subjective.        Vital signs:  Temp:  [97.8 °F (36.6 °C)-98.2 °F (36.8 °C)] 98.2 °F (36.8 °C)  Pulse:  [77-83] 81  Resp:  [18] 18  BP: (110-133)/(64-69)  131/64  SpO2:  [96 %-98 %] 96 %        Physical Exam:    Gen: NAD AO x3  Chest: good air entry CTABL  CVS: normal s1 and s2 RR  Abd: NABS soft NT ND  Neuro: CN 2-12 grossly intact  Ext: no edema in bilateral LE        Diagnostic Data:    Labs:          Recent Labs   Lab 10/30/24  0549 10/31/24  0534 11/01/24  0547 11/02/24 0628 11/03/24  0436   WBC 6.7 6.0 4.0 5.4 6.6   HGB 12.7 13.1 12.3 12.5 13.4   MCV 91.8 90.8 91.7 90.3 88.9   .0 240.0 244.0 284.0 283.0               Recent Labs   Lab 11/01/24 0547 11/02/24 0628 11/03/24  0436   * 105* 93  93   BUN <5* 7* 11  11   CREATSERUM 0.58 0.48* 0.51*  0.51*   CA 8.4* 8.7 9.0  9.0   ALB 3.5 3.3 3.7    142 143  143   K 4.2 3.8 4.0  4.0    111 111  111   CO2 26.0 25.0 26.0  26.0   ALKPHO 49* 48* 48*   AST 9 11 28   ALT <7* <7* 15   BILT 0.3 0.2 0.2   TP 5.5* 5.5* 5.9       Medications:   Scheduled Medications    [START ON 11/5/2024] levothyroxine  65 mcg Intravenous Daily    pantoprazole  40 mg Intravenous Q12H    cefTRIAXone  2 g Intravenous Q24H    metRONIDAZOLE  500 mg Intravenous Q12H    nicotine  1 patch Transdermal Daily               Assessment & Plan:  Acute duodenal perforation  ?PUD  Imaging reviewed  Paraduodenal fluid collection and possible abscess  Contrast study showing extraluminal extravastation of the contrast  GI on consult  No plans for EGD at this time  UGI with gastrograffin done  Continue PPI BID and IV abx  Gsx on consult  Continue IVF and IV abx  Continue TPN  Repeat contrast study in a.m.  If negative may start the patient on CLD  Continue IV Ceftriaxone and Flagyl  NPO at this time, TPN via PICC line starting 11/01/2024  PRN pain control  Hypothyroidism  HLD  Resume home meds once tolerating PO intake        Plan of care discussed with patient or family at bedside.     11/4 HOSPITALIST NOTE    Subjective:  Patient resting comfortably in bed. Denied any active complaints at the time of interview.  Scheduled for  gastrograffin study later today.   No significant overnight events reported.         Objective:  Review of Systems:   ROS completed; pertinent positive and negatives stated in subjective.        Vital signs:  Temp:  [98.2 °F (36.8 °C)-98.3 °F (36.8 °C)] 98.3 °F (36.8 °C)  Pulse:  [75-83] 75  Resp:  [16-18] 16  BP: (131-144)/(59-77) 144/77  SpO2:  [95 %-98 %] 98 %        Physical Exam:    Gen: NAD AO x3  Chest: good air entry CTABL  CVS: normal s1 and s2 RR  Abd: NABS soft NT ND  Neuro: CN 2-12 grossly intact  Ext: no edema in bilateral LE        Diagnostic Data:    Labs:          Recent Labs   Lab 10/31/24  0534 11/01/24  0547 11/02/24  0628 11/03/24  0436 11/04/24  0553   WBC 6.0 4.0 5.4 6.6 8.2   HGB 13.1 12.3 12.5 13.4 13.4   MCV 90.8 91.7 90.3 88.9 90.6   .0 244.0 284.0 283.0 317.0               Recent Labs   Lab 11/02/24  0628 11/03/24  0436 11/04/24  0553   * 93  93 107*  107*   BUN 7* 11  11 11  11   CREATSERUM 0.48* 0.51*  0.51* 0.54*  0.54*   CA 8.7 9.0  9.0 9.2  9.2   ALB 3.3 3.7 3.8    143  143 142  142   K 3.8 4.0  4.0 4.1  4.1    111  111 110  110   CO2 25.0 26.0  26.0 26.0  26.0   ALKPHO 48* 48* 50*   AST 11 28 65*   ALT <7* 15 38   BILT 0.2 0.2 0.2   TP 5.5* 5.9 6.0        Medications:   Scheduled Medications    [START ON 11/5/2024] levothyroxine  65 mcg Intravenous Daily    pantoprazole  40 mg Intravenous Q12H    cefTRIAXone  2 g Intravenous Q24H    metRONIDAZOLE  500 mg Intravenous Q12H    nicotine  1 patch Transdermal Daily               Assessment & Plan:  Acute duodenal perforation  ?PUD  Imaging reviewed  Paraduodenal fluid collection and possible abscess  Contrast study showing extraluminal extravastation of the contrast  GI on consult  No plans for EGD at this time  UGI with gastrograffin done  Continue PPI BID and IV abx  Gsx on consult  Continue IVF and IV abx  Continue TPN  Repeat contrast study pending  If negative may start the patient on CLD  NPO  at this time, TPN via PICC line starting 11/01/2024  Continue IV Ceftriaxone and Flagyl  PRN pain control  Hypothyroidism  HLD  Resume home meds once tolerating PO intake        Plan of care discussed with patient or family at bedside.

## 2024-11-04 NOTE — DIETARY NOTE
ADULT NUTRITION REASSESSMENT    Pt is at high nutrition risk.  Pt does not meet malnutrition criteria.        RECOMMENDATIONS TO MD: See Nutrition Intervention for CPN specifics .     ADMITTING DIAGNOSIS:  Duodenal ulcer with perforation (HCC) [K26.5]  Perforated diverticulum of duodenum [K57.00]  PERTINENT PAST MEDICAL HISTORY:   Past Medical History:    Disorder of thyroid    High cholesterol    Hyperlipidemia       PATIENT STATUS: Initial 10/31/24: Pt assessed due to consult to initiate TPN. Pt admit for duodenal ulcer with perforation. PMHx sig for HLD. Visited pt today, friend in the room. Polish  ID: [id number] utilized for interview. Diet Hx: Pt reported feeling hungry. Endorsed good appetite PTA. Endorsed intakes were decreased for only 2 days PTA due to GI symptoms:pain, one episode of vomiting, and headache. Pt reported that prior to onset of symptoms she was consuming her regular eating pattern of of breakfast of eggs or cheese sandwich, lunch of fruit , and dinner of meat and rice and soups. Weight Hx: pt denied weight loss at admission MST. Pt reported UBW: 138-140#. Per weight history on EHR, pt wt has ranged from  1337-143# over the last couple of years. .9#. Negligible weight loss, not significant. NFPE  conducted, no wasting noted. Pt appears well nourished and does not appear to be at refeeding risk. Nutrition Plan: Will order CPN when PICC line is placed. RD explained nature of Parenteral nutrition support. Pt verbalized understanding.    11/1/24 Consult received to initiate and manage TPN. Chart reviewed. Pt assessed, see note from 10/31. PICC line in place. Reviewed Labs, Reviewed Meds. Ordered TPN for tonight as below. Please consult RD if earlier nutrition intervention is needed. Will follow per protocol.     11/4/24 UPDATE: Remains NPO. TPN continues. UGI planned--monitor for po start to taper/DC TPN.      FOOD/NUTRITION RELATED HISTORY:  Appetite: NPO  Intake: NPO  Intake  Meeting Needs: NPO  Percent Meals Eaten (last 3 days)       None            Food Allergies:  Mushrooms  *(common allergens to PN mixtures reviewed, none were reported)  Cultural/Ethnic/Scientology Preferences: None    GASTROINTESTINAL: +BM 11/4-formed small green, and perforated duodenal ulcer per gastrograffin study today    MEDICATIONS: reviewed   [START ON 11/5/2024] levothyroxine  65 mcg Intravenous Daily    pantoprazole  40 mg Intravenous Q12H    cefTRIAXone  2 g Intravenous Q24H    metRONIDAZOLE  500 mg Intravenous Q12H    nicotine  1 patch Transdermal Daily       LABS: reviewed  Recent Labs     11/02/24  0628 11/03/24  0436 11/04/24  0553   * 93  93 107*  107*   BUN 7* 11  11 11  11   CREATSERUM 0.48* 0.51*  0.51* 0.54*  0.54*   CA 8.7 9.0  9.0 9.2  9.2   MG 2.0 2.0 2.1    143  143 142  142   K 3.8 4.0  4.0 4.1  4.1    111  111 110  110   CO2 25.0 26.0  26.0 26.0  26.0   PHOS 3.3 3.6 3.8   OSMOCALC 292 295  295 294  294       NUTRITION RELATED PHYSICAL FINDINGS:  - Nutrition Focused Physical Exam (NFPE): no wasting noted  - Fluid Accumulation: none  See RN documentation for details  - Skin Integrity: intact See RN documentation for details    ANTHROPOMETRICS:  HT: 156 cm (5' 1.42\")  WT: 60.1 kg (132 lb 9.6 oz)   BMI: Body mass index is 24.72 kg/m².  BMI CLASSIFICATION: 25-29.9 kg/m2 - overweight  IBW: 105 lbs        128% IBW  Usual Body Wt: 138-140 lbs over a couple of years     97% UBW    WEIGHT HISTORY:  Patient Weight(s) for the past 336 hrs:   Weight   11/03/24 1700 60.1 kg (132 lb 9.6 oz)   10/29/24 1817 61.6 kg (135 lb 14.4 oz)   10/29/24 1238 63.5 kg (140 lb)     Wt Readings from Last 10 Encounters:   11/03/24 60.1 kg (132 lb 9.6 oz)   06/24/24 62.1 kg (137 lb)   06/20/24 62.6 kg (138 lb)   06/20/24 62.6 kg (138 lb)   03/19/24 63 kg (139 lb)   01/18/24 62.7 kg (138 lb 3.2 oz)   09/26/23 62.1 kg (137 lb)   10/27/22 62.1 kg (137 lb)   02/08/22 64.9 kg (143 lb)    01/07/22 64.9 kg (143 lb)     NUTRITION DIAGNOSIS/PROBLEM:   Inadequate oral intake related to Decreased ability to consume sufficient energy due to altered GI function 2/2 perforated duodenal ulcer as evidenced by report of poor po 2 days PTA, current NPO status day 3, and need for PN.  NUTRITION DIAGNOSIS PROGRESS:  Improvement (unresolved)--TPN meeting nutritional needs.        NUTRITION INTERVENTION:     NUTRITION PRESCRIPTION:   Estimated Nutrition needs: --dosing wt of 61.6 kg - wt taken on 10/29/24  Calories: 4876-0703 calories/day (22-25 calories per kg Dosing wt)  Protein: 74 -80 g protein/day (1.2-1.3 g protein/kg Dosing wt)  Fluid Needs: 0226-8305 ml/day (cronological vs [place])    - Diet:       Procedures    NPO      - Parenteral Nutrition:   2000 ml volume. 80 g protein, 1200non protein calories (750 dextrose calories and 450 lipid calories). Will provide 1520 total calories with 80 g protein and meet 100 % calorie and 100 % protein needs.      - Meals and snacks: NPO  - Medical Food Supplements-RD added NPO. Rational/use of oral supplements discussed.  - Vitamin and mineral supplements: NPO  - Feeding assistance: NPO  - Nutrition education:  Parenteral nutrition education completed     - Coordination of nutrition care: collaboration with other providers  - Discharge and transfer of nutrition care to new setting or provider: monitor plans    MONITOR AND EVALUATE/NUTRITION GOALS:  - Food and Nutrient Intake:      Monitor: for PO initiation when medically feasible  - Food and Nutrient Administration:      Monitor: TPN tolerance, adequacy of TPN, and for TPN adjustment, when PICC line is placed  - Anthropometric Measurement:    Monitor weight   - Nutrition Goals:     maintain wt within 5%, return to normal GI function, TPN to meet greater than 80% nutrition needs, and support body systems    DIETITIAN FOLLOW UP: RD to follow and monitor nutrition status/manage TPN daily.        Maria Eugenia Yen RD,  MAXX   Clinical Dietitian [id number]         Pain Refusal Text: I offered to prescribe pain medication but the patient refused to take this medication.

## 2024-11-04 NOTE — PROGRESS NOTES
[facility]  part of [facility]ist Progress Note     Dorys Pickens Patient Status:  Inpatient    1950 MRN [id number]   Location [facility] 4W/SW/SE Attending Arpit Hutchins MD   Hosp Day # 6 PCP Cristel Arias MD     Subjective:     Patient resting comfortably in bed. Denied any active complaints at the time of interview.  Scheduled for gastrograffin study later today.   No significant overnight events reported.     Objective:    Review of Systems:   ROS completed; pertinent positive and negatives stated in subjective.      Vital signs:  Temp:  [98.2 °F (36.8 °C)-98.3 °F (36.8 °C)] 98.3 °F (36.8 °C)  Pulse:  [75-83] 75  Resp:  [16-18] 16  BP: (131-144)/(59-77) 144/77  SpO2:  [95 %-98 %] 98 %      Physical Exam:    Gen: NAD AO x3  Chest: good air entry CTABL  CVS: normal s1 and s2 RR  Abd: NABS soft NT ND  Neuro: CN 2-12 grossly intact  Ext: no edema in bilateral LE      Diagnostic Data:    Labs:  Recent Labs   Lab 10/31/24  0534 24  0547 24  0628 24  0436 24  0553   WBC 6.0 4.0 5.4 6.6 8.2   HGB 13.1 12.3 12.5 13.4 13.4   MCV 90.8 91.7 90.3 88.9 90.6   .0 244.0 284.0 283.0 317.0       Recent Labs   Lab 24  0628 24  0436 24  0553   * 93  93 107*  107*   BUN 7* 11  11 11  11   CREATSERUM 0.48* 0.51*  0.51* 0.54*  0.54*   CA 8.7 9.0  9.0 9.2  9.2   ALB 3.3 3.7 3.8    143  143 142  142   K 3.8 4.0  4.0 4.1  4.1    111  111 110  110   CO2 25.0 26.0  26.0 26.0  26.0   ALKPHO 48* 48* 50*   AST 11 28 65*   ALT <7* 15 38   BILT 0.2 0.2 0.2   TP 5.5* 5.9 6.0       Estimated Creatinine Clearance: 71.5 mL/min (A) (based on SCr of 0.54 mg/dL (L)).    No results for input(s): \"PTP\", \"INR\" in the last 168 hours.           Imaging: Imaging data reviewed in Epic.    Medications:    [START ON 2024] levothyroxine  65 mcg Intravenous Daily    pantoprazole  40 mg Intravenous Q12H    cefTRIAXone  2 g  Intravenous Q24H    metRONIDAZOLE  500 mg Intravenous Q12H    nicotine  1 patch Transdermal Daily       Assessment & Plan:     Acute duodenal perforation  ?PUD  Imaging reviewed  Paraduodenal fluid collection and possible abscess  Contrast study showing extraluminal extravastation of the contrast  GI on consult  No plans for EGD at this time  UGI with gastrograffin done  Continue PPI BID and IV abx  Gsx on consult  Continue IVF and IV abx  Continue TPN  Repeat contrast study pending  If negative may start the patient on CLD  NPO at this time, TPN via PICC line starting 11/01/2024  Continue IV Ceftriaxone and Flagyl  PRN pain control  Hypothyroidism  HLD  Resume home meds once tolerating PO intake      Plan of care discussed with patient or family at bedside.      Supplementary Documentation:     Quality:  DVT Prophylaxis: SCDs, resume DVT ppx if okay with Gsx      Estimated date of discharge: TBD  Discharge is dependent on: clinical stability  At this point Ms. Pickens is expected to be discharge to: home                   Arpit Hutchins MD  Hospitalist    MDM: High, I personally reviewed the available laboratories, imaging including CT. I discussed the case with RN. I ordered laboratories, studies including AM labs.  Medical decision making high, risk is high.         The 21st Century Cures Act makes medical notes like these available to patients in the interest of transparency. Please be advised this is a medical document. Medical documents are intended to carry relevant information, facts as evident, and the clinical opinion of the practitioner. The medical note is intended as peer to peer communication and may appear blunt or direct. It is written in medical language and may contain abbreviations or verbiage that are unfamiliar.

## 2024-11-04 NOTE — PLAN OF CARE
Problem: Patient Centered Care  Goal: Patient preferences are identified and integrated in the patient's plan of care  Description: Interventions:  - What would you like us to know as we care for you?   - Provide timely, complete, and accurate information to patient/family  - Incorporate patient and family knowledge, values, beliefs, and cultural backgrounds into the planning and delivery of care  - Encourage patient/family to participate in care and decision-making at the level they choose  - Honor patient and family perspectives and choices  Outcome: Progressing     Problem: Patient/Family Goals  Goal: Patient/Family Long Term Goal  Description: Patient's Long Term Goal:     Interventions:  - See additional Care Plan goals for specific interventions  Outcome: Progressing  Goal: Patient/Family Short Term Goal  Description: Patient's Short Term Goal:     Interventions:   - See additional Care Plan goals for specific interventions  Outcome: Progressing     Problem: RESPIRATORY - ADULT  Goal: Achieves optimal ventilation and oxygenation  Description: INTERVENTIONS:  - Assess for changes in respiratory status  - Assess for changes in mentation and behavior  - Position to facilitate oxygenation and minimize respiratory effort  - Oxygen supplementation based on oxygen saturation or ABGs  - Provide Smoking Cessation handout, if applicable  - Encourage broncho-pulmonary hygiene including cough, deep breathe, Incentive Spirometry  - Assess the need for suctioning and perform as needed  - Assess and instruct to report SOB or any respiratory difficulty  - Respiratory Therapy support as indicated  - Manage/alleviate anxiety  - Monitor for signs/symptoms of CO2 retention  Outcome: Progressing

## 2024-11-04 NOTE — PROGRESS NOTES
[facility]  part of [facility]    Progress Note    Dorys Pickens Patient Status:  Inpatient    1950 MRN [id number]   Location [facility] 4W/SW/SE Attending Arpit Hutchins MD   Hosp Day # 6 PCP Cristel Arias MD     Subjective:  Doing well       Objective/Physical Exam:  General: Alert, orientated x3.  Cooperative.  No apparent distress.  Vital Signs:    Labs:  Lab Results   Component Value Date    WBC 8.2 2024    HGB 13.4 2024    HCT 38.4 2024    .0 2024    CREATSERUM 0.54 (L) 2024    CREATSERUM 0.54 (L) 2024    BUN 11 2024    BUN 11 2024     2024     2024    K 4.1 2024    K 4.1 2024     2024     2024    CO2 26.0 2024    CO2 26.0 2024     (H) 2024     (H) 2024    CA 9.2 2024    CA 9.2 2024    ALB 3.8 2024    ALKPHO 50 (L) 2024    BILT 0.2 2024    TP 6.0 2024    AST 65 (H) 2024    ALT 38 2024    T4F 1.5 2024    TSH 2.029 2024    LIP 29 10/29/2024    MG 2.1 2024    PHOS 3.8 2024       Imaging:  XR ABDOMEN (1 VIEW) (CPT=74018)    Result Date: 2024  PROCEDURE: XR ABDOMEN (1 VIEW) (CPT=74018)  COMPARISON: None.  INDICATIONS: Duodenal diverticulum - perforation.  TECHNIQUE:   Single view.   FINDINGS:  BOWEL GAS PATTERN: There is large amount of contrast in the ascending, transverse and proximal descending colon with the transverse colon contrast obscuring the region of the duodenal perforation, and the examination will be rescheduled for tomorrow once  the colon contrast has been evacuated. SOFT TISSUES: Normal.  No masses or organomegaly.  CALCIFICATIONS: None significant. BONES: Normal.  No significant arthritic changes.  OTHER: Negative.  No abnormal gaseous collections.          CONCLUSION:  1. There is a large amount of contrast in the ascending,  transverse and proximal descending colon with the transverse colon contrast obscuring the region of the duodenal perforation , and the examination will be rescheduled for tomorrow once the colon contrast has been evacuated.    Dictated by (CST): Paul Aranda MD on 11/04/2024 at 12:14 PM     Finalized by (CST): Paul Aranda MD on 11/04/2024 at 12:16 PM          XR UGI/ESOPHAGUS SINGLE CONTRAST (CPT=74240)    Result Date: 10/31/2024  PROCEDURE: XR UGI/ESOPHAGUS SINGLE CONTRAST (CPT=74240)  COMPARISON: [facility], CT ABDOMEN + PELVIS (CONTRAST ONLY) (CPT=74177), 10/29/2024, 3:22 PM.   INDICATIONS: Contained perforation of the descending duodenum on recent CT scan..  TECHNIQUE:   A single contrast esophagram and upper gastrointestinal series was performed with fluoroscopy in the usual manner.  The patient was inadvertently given a moderate amount of thin barium for this examination as opposed to water-soluble Gastrografin contrast.   FLUOROSCOPY IMAGES OBTAINED:  22 FLUOROSCOPY TIME:  2.6 minute RADIATION DOSE (Dose Area Product):  3830.1-uGy*m^2 FINDINGS:  ESOPHAGUS STRUCTURE:   Normal.  No visible obstruction, stricture or dilation.  MOTILITY:   Normal.  HERNIA:   None visible.  REFLUX:   None visible.  OTHER:   Negative.   UPPER GI STOMACH:   Poorly distended stomach because of lack of air contrast technique.  No well-defined mass or outlet obstruction. DUODENUM:   There is apparent extraluminal extravasation of contrast at the descending portion of the duodenum where there is a 2.3 x 2.4 cm collection of contrast which extends beyond the lumen of the duodenum, it is difficult to tell if this is all free contained extravasated contrast at a site of perforation or if there may be an underlying diverticulum with associated adjacent extravasation from a duodenal diverticulum perforation.  There is mild spasm poor distention of the descending portion of  the duodenal sweep.  There are 2 moderate-sized  diverticula identified in transverse portion the duodenum with one measuring 2.9 x 3.1 cm, and adjacent 1 measuring 1.2 x 1.4 cm.  There does not appear to be extravasation or perforation from the 2 adjacent diverticula of the duodenal sweep. OTHER:   Negative.              CONCLUSION:  1. There is apparent extraluminal extravasation of contrast at the descending portion of the duodenal sweep just distal to the duodenal bulb which appears to be contained and overall measures about 2.3 x 2.4 cm, and it is difficult to tell if this is all  extraluminal contained contrast or if there may be a diverticulum at this site with adjacent extravasation of contrast from a duodenal diverticular rupture.  There is no well-defined mass.  There is mild spasm and poor distention of the descending portion of the duodenal sweep.  Correlate clinically and with endoscopy.  There are two small to moderate adjacent diverticula noted of the transverse portion of the duodenal sweep without evidence of perforation or contrast extravasation. 2. Normal esophagus.  Normal appearance to the stomach which is poorly distended though.  No well-defined mass.    Dictated by (CST): Paul Aranda MD on 10/31/2024 at 1:24 PM     Finalized by (CST): Paul Aranda MD on 10/31/2024 at 1:38 PM          CT ABDOMEN+PELVIS(CONTRAST ONLY)(CPT=74177)    Result Date: 10/29/2024  PROCEDURE: CT ABDOMEN + PELVIS (CONTRAST ONLY) (CPT=74177)  COMPARISON: None.  INDICATIONS: Epigastric pain, nausea, and vomiting.  TECHNIQUE: CT images of the abdomen and pelvis were obtained with non-ionic intravenous contrast material.  Automated exposure control for dose reduction was used. Adjustment of the mA and/or kV was done based on the patient's size. Use of iterative reconstruction technique for dose reduction was used.  Dose information is transmitted to the ACR (American College of Radiology) NRDR (National Radiology Data Registry) which includes the Dose Index Registry.   FINDINGS:  LIVER: Too small to characterize hypoattenuating hepatic dome lesion. BILIARY: No evidence for biliary dilatation. Post cholecystectomy. SPLEEN: Normal.  PANCREAS: Normal.  ADRENALS: Normal. KIDNEYS: Normal. AORTA/VASCULAR: Atherosclerotic vascular calcification including coronary artery calcification. No aneurysm or dissection.  LYMPHADENOPATHY: None. GI/MESENTERY: Multiple duodenal diverticula with a contained perforation of the 2nd portion of the duodenum with surrounding inflammatory changes including small para duodenal fluid collections measuring up to 1.6 x 3.6 cm Normal appendix.  Colonic diverticulosis.  No obstruction, bowel wall thickening, or mesenteric mass. ABDOMINAL WALL: Normal.  No mass or hernia.  URINARY BLADDER: Normal. ASCITES:   None. PELVIC ORGANS: No suspect pelvic mass. BONES: Mild-to-moderate chronic superior endplate compression fracture T8 and T10.  Minimal chronic anterior wedging of T9 and T11.  Schmorl's node associated with mild chronic superior endplate compression of L4.  No suspect bone lesion. LUNG BASES: Normal.  No visible pulmonary or pleural disease.  OTHER: Negative.          CONCLUSION:  1. Contained perforation 2nd portion of duodenum either related to perforated diverticulitis or ulcer.  Small para duodenal fluid collection measuring approximately 3 x 3.6 x 1.6 cm. 2. Multiple duodenal diverticula. 3. Moderate coronary artery calcification.  4. Findings were called to Dr. Lara.    Dictated by (CST): Dustin Hughes MD on 10/29/2024 at 3:46 PM     Finalized by (CST): Dustin Hughes MD on 10/29/2024 at 4:00 PM          XR CHEST AP PORTABLE  (CPT=71045)    Result Date: 10/29/2024  PROCEDURE: XR CHEST AP PORTABLE  (CPT=71045) TIME: 13:18.   COMPARISON: [facility], XR CHEST AP PORTABLE (CPT=71045), 6/20/2024, 1:27 PM.  [facility], XR CHEST PA + LAT CHEST (CPT=71046), 1/08/2024, 11:33 AM.  INDICATIONS: Epigastric pain, nausea, and  vomitting x4 days. Diarrhea x1 day.  TECHNIQUE:   Single view.   FINDINGS:  CARDIAC/VASC: The cardiomediastinal silhouette is unchanged in size.  There is atherosclerotic calcification of the thoracic aorta. MEDIAST/JENNIFER:   No visible mass or adenopathy. LUNGS/PLEURA: Small bibasilar opacities.  No pleural effusion.  No pneumothorax. BONES: Multilevel degenerative changes of the thoracic spine.  The thoracic compression fracture deformities were better demonstrated the prior radiograph dated 01/08/2024. OTHER: Negative.          CONCLUSION:   Small bibasilar opacities, which may reflect atelectasis with or without superimposed pneumonia.     Dictated by (CST): Lalo King MD on 10/29/2024 at 1:33 PM     Finalized by (CST): Lalo King MD on 10/29/2024 at 1:35 PM            Assessment/Plan:  Patient Active Problem List   Diagnosis    Osteoporosis    Contusion of right chest wall    Perforated diverticulum of duodenum    Duodenal ulcer with perforation (HCC)    Perforated duodenal ulcer (HCC)    Small bowel diverticular disease   Unable to perform repeat contrast study today due to residual  Will perform tomorrow    DAVID TREVIÑO MD  11/4/2024  1:25 PM

## 2024-11-05 ENCOUNTER — APPOINTMENT (OUTPATIENT)
Dept: GENERAL RADIOLOGY | Facility: HOSPITAL | Age: 74
End: 2024-11-05
Attending: SPECIALIST
Payer: MEDICAID

## 2024-11-05 LAB
ALBUMIN SERPL-MCNC: 3.7 G/DL (ref 3.2–4.8)
ALBUMIN/GLOB SERPL: 1.7 {RATIO} (ref 1–2)
ALP LIVER SERPL-CCNC: 48 U/L
ALT SERPL-CCNC: 51 U/L
ANION GAP SERPL CALC-SCNC: 6 MMOL/L (ref 0–18)
ANION GAP SERPL CALC-SCNC: 6 MMOL/L (ref 0–18)
AST SERPL-CCNC: 55 U/L (ref ?–34)
BASOPHILS # BLD AUTO: 0.08 X10(3) UL (ref 0–0.2)
BASOPHILS NFR BLD AUTO: 1.2 %
BILIRUB SERPL-MCNC: 0.2 MG/DL (ref 0.2–1.1)
BUN BLD-MCNC: 12 MG/DL (ref 9–23)
BUN BLD-MCNC: 12 MG/DL (ref 9–23)
BUN/CREAT SERPL: 22.2 (ref 10–20)
BUN/CREAT SERPL: 22.2 (ref 10–20)
CALCIUM BLD-MCNC: 9.2 MG/DL (ref 8.7–10.4)
CALCIUM BLD-MCNC: 9.2 MG/DL (ref 8.7–10.4)
CHLORIDE SERPL-SCNC: 111 MMOL/L (ref 98–112)
CHLORIDE SERPL-SCNC: 111 MMOL/L (ref 98–112)
CO2 SERPL-SCNC: 26 MMOL/L (ref 21–32)
CO2 SERPL-SCNC: 26 MMOL/L (ref 21–32)
CREAT BLD-MCNC: 0.54 MG/DL
CREAT BLD-MCNC: 0.54 MG/DL
DEPRECATED RDW RBC AUTO: 43 FL (ref 35.1–46.3)
EGFRCR SERPLBLD CKD-EPI 2021: 97 ML/MIN/1.73M2 (ref 60–?)
EGFRCR SERPLBLD CKD-EPI 2021: 97 ML/MIN/1.73M2 (ref 60–?)
EOSINOPHIL # BLD AUTO: 0.25 X10(3) UL (ref 0–0.7)
EOSINOPHIL NFR BLD AUTO: 3.7 %
ERYTHROCYTE [DISTWIDTH] IN BLOOD BY AUTOMATED COUNT: 12.9 % (ref 11–15)
GLOBULIN PLAS-MCNC: 2.2 G/DL (ref 2–3.5)
GLUCOSE BLD-MCNC: 99 MG/DL (ref 70–99)
GLUCOSE BLD-MCNC: 99 MG/DL (ref 70–99)
HCT VFR BLD AUTO: 39.2 %
HGB BLD-MCNC: 13.5 G/DL
IMM GRANULOCYTES # BLD AUTO: 0.1 X10(3) UL (ref 0–1)
IMM GRANULOCYTES NFR BLD: 1.5 %
LYMPHOCYTES # BLD AUTO: 1.33 X10(3) UL (ref 1–4)
LYMPHOCYTES NFR BLD AUTO: 19.7 %
MAGNESIUM SERPL-MCNC: 2.1 MG/DL (ref 1.6–2.6)
MCH RBC QN AUTO: 31.5 PG (ref 26–34)
MCHC RBC AUTO-ENTMCNC: 34.4 G/DL (ref 31–37)
MCV RBC AUTO: 91.4 FL
MONOCYTES # BLD AUTO: 0.52 X10(3) UL (ref 0.1–1)
MONOCYTES NFR BLD AUTO: 7.7 %
NEUTROPHILS # BLD AUTO: 4.48 X10 (3) UL (ref 1.5–7.7)
NEUTROPHILS # BLD AUTO: 4.48 X10(3) UL (ref 1.5–7.7)
NEUTROPHILS NFR BLD AUTO: 66.2 %
OSMOLALITY SERPL CALC.SUM OF ELEC: 296 MOSM/KG (ref 275–295)
OSMOLALITY SERPL CALC.SUM OF ELEC: 296 MOSM/KG (ref 275–295)
PHOSPHATE SERPL-MCNC: 3.6 MG/DL (ref 2.4–5.1)
PLATELET # BLD AUTO: 331 10(3)UL (ref 150–450)
POTASSIUM SERPL-SCNC: 4.4 MMOL/L (ref 3.5–5.1)
POTASSIUM SERPL-SCNC: 4.4 MMOL/L (ref 3.5–5.1)
PROT SERPL-MCNC: 5.9 G/DL (ref 5.7–8.2)
RBC # BLD AUTO: 4.29 X10(6)UL
SODIUM SERPL-SCNC: 143 MMOL/L (ref 136–145)
SODIUM SERPL-SCNC: 143 MMOL/L (ref 136–145)
WBC # BLD AUTO: 6.8 X10(3) UL (ref 4–11)

## 2024-11-05 PROCEDURE — 99233 SBSQ HOSP IP/OBS HIGH 50: CPT | Performed by: INTERNAL MEDICINE

## 2024-11-05 PROCEDURE — 74018 RADEX ABDOMEN 1 VIEW: CPT | Performed by: SPECIALIST

## 2024-11-05 NOTE — PLAN OF CARE
Problem: Patient Centered Care  Goal: Patient preferences are identified and integrated in the patient's plan of care  Description: Interventions:  - What would you like us to know as we care for you?  Patient Polish speaking.   - Provide timely, complete, and accurate information to patient/family  - Incorporate patient and family knowledge, values, beliefs, and cultural backgrounds into the planning and delivery of care  - Encourage patient/family to participate in care and decision-making at the level they choose  - Honor patient and family perspectives and choices  Outcome: Progressing   Patient alert and oriented X 4, NPO, TPN infusing , right PICC line in place. Continue IV Abx. Patient denies abdominal pain,  voiding freely. Ambulating frequently in hallways. Fall precautions in place Upper GI was rescheduled for tomorrow.

## 2024-11-05 NOTE — PROGRESS NOTES
[facility]  part of [facility] Hospitalist Progress Note     Dorys Pickens Patient Status:  Inpatient    1950 MRN [id number]   Location [facility] 4W/SW/SE Attending Arpit Hutchins MD   Hosp Day # 7 PCP Cristel Arias MD     Subjective:     Patient resting comfortably in bed. Denied any active complaints at the time of interview.  Repeat contrast study pending - will be done later today.  Patient in good spirits this morning.  No significant overnight events reported.     Objective:    Review of Systems:   ROS completed; pertinent positive and negatives stated in subjective.      Vital signs:  Temp:  [97.8 °F (36.6 °C)-98.3 °F (36.8 °C)] 98.3 °F (36.8 °C)  Pulse:  [82-87] 86  Resp:  [14-18] 14  BP: (118-131)/(54-61) 118/54  SpO2:  [94 %-96 %] 95 %      Physical Exam:    Gen: NAD AO x3  Chest: good air entry CTABL  CVS: normal s1 and s2 RR  Abd: NABS soft NT ND  Neuro: CN 2-12 grossly intact  Ext: no edema in bilateral LE      Diagnostic Data:    Labs:  Recent Labs   Lab 24  0547 24  0628 24  0436 24  0553 24  0523   WBC 4.0 5.4 6.6 8.2 6.8   HGB 12.3 12.5 13.4 13.4 13.5   MCV 91.7 90.3 88.9 90.6 91.4   .0 284.0 283.0 317.0 331.0       Recent Labs   Lab 24  0436 24  0553 24  0523   GLU 93  93 107*  107* 99  99   BUN 11  11 11  11 12  12   CREATSERUM 0.51*  0.51* 0.54*  0.54* 0.54*  0.54*   CA 9.0  9.0 9.2  9.2 9.2  9.2   ALB 3.7 3.8 3.7     143 142  142 143  143   K 4.0  4.0 4.1  4.1 4.4  4.4     111 110  110 111  111   CO2 26.0  26.0 26.0  26.0 26.0  26.0   ALKPHO 48* 50* 48*   AST 28 65* 55*   ALT 15 38 51*   BILT 0.2 0.2 0.2   TP 5.9 6.0 5.9       Estimated Creatinine Clearance: 71.5 mL/min (A) (based on SCr of 0.54 mg/dL (L)).    No results for input(s): \"PTP\", \"INR\" in the last 168 hours.           Imaging: Imaging data reviewed in Epic.    Medications:    levothyroxine  65 mcg  Intravenous Daily    pantoprazole  40 mg Intravenous Q12H    cefTRIAXone  2 g Intravenous Q24H    metRONIDAZOLE  500 mg Intravenous Q12H    nicotine  1 patch Transdermal Daily       Assessment & Plan:     Acute duodenal perforation  ?PUD  Imaging reviewed  Paraduodenal fluid collection and possible abscess  Contrast study showing extraluminal extravastation of the contrast  GI on consult  No plans for EGD at this time  UGI with gastrograffin done  Continue PPI BID and IV abx  Gsx on consult  Continue IVF and IV abx  Continue TPN  Repeat contrast study pending - expected to be done 11/5/2024  If negative may start the patient on CLD  NPO at this time, TPN via PICC line starting 11/01/2024  Continue IV Ceftriaxone and Flagyl  PRN pain control  Hypothyroidism  HLD  Resume home meds once tolerating PO intake      Plan of care discussed with patient or family at bedside.      Supplementary Documentation:     Quality:  DVT Prophylaxis: SCDs, resume DVT ppx if okay with Gsx      Estimated date of discharge: TBD  Discharge is dependent on: clinical stability  At this point Ms. Pickens is expected to be discharge to: home                   Arpit Hutchins MD  Hospitalist    MDM: High, I personally reviewed the available laboratories, imaging including CT. I discussed the case with RN. I ordered laboratories, studies including AM labs.  Medical decision making high, risk is high.         The 21st Century Cures Act makes medical notes like these available to patients in the interest of transparency. Please be advised this is a medical document. Medical documents are intended to carry relevant information, facts as evident, and the clinical opinion of the practitioner. The medical note is intended as peer to peer communication and may appear blunt or direct. It is written in medical language and may contain abbreviations or verbiage that are unfamiliar.

## 2024-11-05 NOTE — PLAN OF CARE
A&ox4, primarily polish speaking. RA. No reports of pain or n/v. TPN. Unable to complete xray today due to residual contrast in abdomen. Repeat tmw am. TPN and IV abx to PICC in R arm. NPO w/ sips. Up independent. Call light in reach and using appropriately.   Problem: Patient Centered Care  Goal: Patient preferences are identified and integrated in the patient's plan of care  Description: Interventions:  - What would you like us to know as we care for you?   - Provide timely, complete, and accurate information to patient/family  - Incorporate patient and family knowledge, values, beliefs, and cultural backgrounds into the planning and delivery of care  - Encourage patient/family to participate in care and decision-making at the level they choose  - Honor patient and family perspectives and choices  Outcome: Progressing

## 2024-11-05 NOTE — SPIRITUAL CARE NOTE
Spiritual Care Visit Note    Patient Name: Dorys Pickens Date of Spiritual Care Visit: 24   : 1950 Primary Dx: Duodenal ulcer with perforation (HCC)       Referred By: Referral From:     Spiritual Care Taxonomy:    Intended Effects: Establish rapport and connectedness    Methods: Collaborate with care team member;Offer support    Interventions: Active listening;Ask guided questions;Silent prayer    Visit Type/Summary:     - Spiritual Care: Consulted with RN prior to visit. Offered empathic listening and emotional support. Patient declined a  visit at this time. Provided information regarding how to contact Spiritual Care and left a Spiritual Care information card.     CASE Rdz CAMII   [id number]     Spiritual Care support can be requested via an Epic consult. For urgent/immediate needs, please contact the On Call  at: [place]: [phone]

## 2024-11-05 NOTE — PLAN OF CARE
Patient is alert & oriented x4. On room air. Vitals stable. IV rocephin & flagyl. TPN infusing through R picc. NPO with ice chips. Denies pain or nausea. Voiding freely. Up independently. Plan for UGI this AM.       Problem: Patient Centered Care  Goal: Patient preferences are identified and integrated in the patient's plan of care  Description: Interventions:  - What would you like us to know as we care for you?   - Provide timely, complete, and accurate information to patient/family  - Incorporate patient and family knowledge, values, beliefs, and cultural backgrounds into the planning and delivery of care  - Encourage patient/family to participate in care and decision-making at the level they choose  - Honor patient and family perspectives and choices  Outcome: Progressing     Problem: Patient/Family Goals  Goal: Patient/Family Long Term Goal  Description: Patient's Long Term Goal:     Interventions:  -   - See additional Care Plan goals for specific interventions  Outcome: Progressing  Goal: Patient/Family Short Term Goal  Description: Patient's Short Term Goal:     Interventions:     - See additional Care Plan goals for specific interventions  Outcome: Progressing     Problem: RESPIRATORY - ADULT  Goal: Achieves optimal ventilation and oxygenation  Description: INTERVENTIONS:  - Assess for changes in respiratory status  - Assess for changes in mentation and behavior  - Position to facilitate oxygenation and minimize respiratory effort  - Oxygen supplementation based on oxygen saturation or ABGs  - Provide Smoking Cessation handout, if applicable  - Encourage broncho-pulmonary hygiene including cough, deep breathe, Incentive Spirometry  - Assess the need for suctioning and perform as needed  - Assess and instruct to report SOB or any respiratory difficulty  - Respiratory Therapy support as indicated  - Manage/alleviate anxiety  - Monitor for signs/symptoms of CO2 retention  Outcome: Progressing     Problem:  GASTROINTESTINAL - ADULT  Goal: Minimal or absence of nausea and vomiting  Description: INTERVENTIONS:  - Maintain adequate hydration with IV or PO as ordered and tolerated  - Nasogastric tube to low intermittent suction as ordered  - Evaluate effectiveness of ordered antiemetic medications  - Provide nonpharmacologic comfort measures as appropriate  - Advance diet as tolerated, if ordered  - Obtain nutritional consult as needed  - Evaluate fluid balance  Outcome: Progressing  Goal: Maintains or returns to baseline bowel function  Description: INTERVENTIONS:  - Assess bowel function  - Maintain adequate hydration with IV or PO as ordered and tolerated  - Evaluate effectiveness of GI medications  - Encourage mobilization and activity  - Obtain nutritional consult as needed  - Establish a toileting routine/schedule  - Consider collaborating with pharmacy to review patient's medication profile  Outcome: Progressing

## 2024-11-05 NOTE — PROGRESS NOTES
[facility]  part of [facility]    Progress Note    Dorys Pickens Patient Status:  Inpatient    1950 MRN [id number]   Location [facility] 4W/SW/SE Attending Arpit Hutchins MD   Hosp Day # 7 PCP Cristel Arias MD     Subjective:  Feels ok    Objective/Physical Exam:  General: Alert, orientated x3.  Cooperative.  No apparent distress.  Vital Signs:  Blood pressure 123/53, pulse 102, temperature 98 °F (36.7 °C), temperature source Oral, resp. rate 16, height 5' 1.42\" (1.56 m), weight 132 lb 9.6 oz (60.1 kg), SpO2 98%, not currently breastfeeding.    Labs:  Lab Results   Component Value Date    WBC 6.8 2024    HGB 13.5 2024    HCT 39.2 2024    .0 2024    CREATSERUM 0.54 (L) 2024    CREATSERUM 0.54 (L) 2024    BUN 12 2024    BUN 12 2024     2024     2024    K 4.4 2024    K 4.4 2024     2024     2024    CO2 26.0 2024    CO2 26.0 2024    GLU 99 2024    GLU 99 2024    CA 9.2 2024    CA 9.2 2024    ALB 3.7 2024    ALKPHO 48 (L) 2024    BILT 0.2 2024    TP 5.9 2024    AST 55 (H) 2024    ALT 51 (H) 2024    T4F 1.5 2024    TSH 2.029 2024    LIP 29 10/29/2024    MG 2.1 2024    PHOS 3.6 2024       Imaging:  XR ABDOMEN (1 VIEW) (CPT=74018)    Result Date: 2024  PROCEDURE: XR ABDOMEN (1 VIEW) (CPT=74018)  COMPARISON: [facility], XR ABDOMEN (1 VIEW) (CPT=74018), 2024, 9:51 AM.  INDICATIONS: Duodenal Perforation.  TECHNIQUE:   Single view.   FINDINGS:  BOWEL GAS PATTERN: There is still a moderate amount of contrast throughout the ascending, transverse and descending colon which would obscure the site of extravasation of contrast from the known duodenal diverticulum.  This will be attempted tomorrow once the contrast hopefully clears from the colon.  No bowel  obstruction. SOFT TISSUES: Normal.  No masses or organomegaly.  CALCIFICATIONS: None significant. BONES: Normal.  No significant arthritic changes.  OTHER: Negative.  No abnormal gaseous collections.          CONCLUSION:  1. Persistent moderate contrast in the colon obscuring the region of the duodenal diverticulum extravasation.  The examination will be attempted again tomorrow.    Dictated by (CST): Paul Aranda MD on 11/05/2024 at 1:29 PM     Finalized by (CST): Paul Aranda MD on 11/05/2024 at 1:30 PM          XR ABDOMEN (1 VIEW) (CPT=74018)    Result Date: 11/4/2024  PROCEDURE: XR ABDOMEN (1 VIEW) (CPT=74018)  COMPARISON: None.  INDICATIONS: Duodenal diverticulum - perforation.  TECHNIQUE:   Single view.   FINDINGS:  BOWEL GAS PATTERN: There is large amount of contrast in the ascending, transverse and proximal descending colon with the transverse colon contrast obscuring the region of the duodenal perforation, and the examination will be rescheduled for tomorrow once  the colon contrast has been evacuated. SOFT TISSUES: Normal.  No masses or organomegaly.  CALCIFICATIONS: None significant. BONES: Normal.  No significant arthritic changes.  OTHER: Negative.  No abnormal gaseous collections.          CONCLUSION:  1. There is a large amount of contrast in the ascending, transverse and proximal descending colon with the transverse colon contrast obscuring the region of the duodenal perforation , and the examination will be rescheduled for tomorrow once the colon contrast has been evacuated.    Dictated by (CST): Paul Aranda MD on 11/04/2024 at 12:14 PM     Finalized by (CST): Paul Aranda MD on 11/04/2024 at 12:16 PM          XR UGI/ESOPHAGUS SINGLE CONTRAST (CPT=74240)    Result Date: 10/31/2024  PROCEDURE: XR UGI/ESOPHAGUS SINGLE CONTRAST (CPT=74240)  COMPARISON: [facility], CT ABDOMEN + PELVIS (CONTRAST ONLY) (CPT=74177), 10/29/2024, 3:22 PM.   INDICATIONS: Contained perforation of the  descending duodenum on recent CT scan..  TECHNIQUE:   A single contrast esophagram and upper gastrointestinal series was performed with fluoroscopy in the usual manner.  The patient was inadvertently given a moderate amount of thin barium for this examination as opposed to water-soluble Gastrografin contrast.   FLUOROSCOPY IMAGES OBTAINED:  22 FLUOROSCOPY TIME:  2.6 minute RADIATION DOSE (Dose Area Product):  3830.1-uGy*m^2 FINDINGS:  ESOPHAGUS STRUCTURE:   Normal.  No visible obstruction, stricture or dilation.  MOTILITY:   Normal.  HERNIA:   None visible.  REFLUX:   None visible.  OTHER:   Negative.   UPPER GI STOMACH:   Poorly distended stomach because of lack of air contrast technique.  No well-defined mass or outlet obstruction. DUODENUM:   There is apparent extraluminal extravasation of contrast at the descending portion of the duodenum where there is a 2.3 x 2.4 cm collection of contrast which extends beyond the lumen of the duodenum, it is difficult to tell if this is all free contained extravasated contrast at a site of perforation or if there may be an underlying diverticulum with associated adjacent extravasation from a duodenal diverticulum perforation.  There is mild spasm poor distention of the descending portion of  the duodenal sweep.  There are 2 moderate-sized diverticula identified in transverse portion the duodenum with one measuring 2.9 x 3.1 cm, and adjacent 1 measuring 1.2 x 1.4 cm.  There does not appear to be extravasation or perforation from the 2 adjacent diverticula of the duodenal sweep. OTHER:   Negative.              CONCLUSION:  1. There is apparent extraluminal extravasation of contrast at the descending portion of the duodenal sweep just distal to the duodenal bulb which appears to be contained and overall measures about 2.3 x 2.4 cm, and it is difficult to tell if this is all  extraluminal contained contrast or if there may be a diverticulum at this site with adjacent extravasation  of contrast from a duodenal diverticular rupture.  There is no well-defined mass.  There is mild spasm and poor distention of the descending portion of the duodenal sweep.  Correlate clinically and with endoscopy.  There are two small to moderate adjacent diverticula noted of the transverse portion of the duodenal sweep without evidence of perforation or contrast extravasation. 2. Normal esophagus.  Normal appearance to the stomach which is poorly distended though.  No well-defined mass.    Dictated by (CST): Paul Aranda MD on 10/31/2024 at 1:24 PM     Finalized by (CST): Paul Aranda MD on 10/31/2024 at 1:38 PM          CT ABDOMEN+PELVIS(CONTRAST ONLY)(CPT=74177)    Result Date: 10/29/2024  PROCEDURE: CT ABDOMEN + PELVIS (CONTRAST ONLY) (CPT=74177)  COMPARISON: None.  INDICATIONS: Epigastric pain, nausea, and vomiting.  TECHNIQUE: CT images of the abdomen and pelvis were obtained with non-ionic intravenous contrast material.  Automated exposure control for dose reduction was used. Adjustment of the mA and/or kV was done based on the patient's size. Use of iterative reconstruction technique for dose reduction was used.  Dose information is transmitted to the ACR (American College of Radiology) NRDR (National Radiology Data Registry) which includes the Dose Index Registry.  FINDINGS:  LIVER: Too small to characterize hypoattenuating hepatic dome lesion. BILIARY: No evidence for biliary dilatation. Post cholecystectomy. SPLEEN: Normal.  PANCREAS: Normal.  ADRENALS: Normal. KIDNEYS: Normal. AORTA/VASCULAR: Atherosclerotic vascular calcification including coronary artery calcification. No aneurysm or dissection.  LYMPHADENOPATHY: None. GI/MESENTERY: Multiple duodenal diverticula with a contained perforation of the 2nd portion of the duodenum with surrounding inflammatory changes including small para duodenal fluid collections measuring up to 1.6 x 3.6 cm Normal appendix.  Colonic diverticulosis.  No obstruction,  bowel wall thickening, or mesenteric mass. ABDOMINAL WALL: Normal.  No mass or hernia.  URINARY BLADDER: Normal. ASCITES:   None. PELVIC ORGANS: No suspect pelvic mass. BONES: Mild-to-moderate chronic superior endplate compression fracture T8 and T10.  Minimal chronic anterior wedging of T9 and T11.  Schmorl's node associated with mild chronic superior endplate compression of L4.  No suspect bone lesion. LUNG BASES: Normal.  No visible pulmonary or pleural disease.  OTHER: Negative.          CONCLUSION:  1. Contained perforation 2nd portion of duodenum either related to perforated diverticulitis or ulcer.  Small para duodenal fluid collection measuring approximately 3 x 3.6 x 1.6 cm. 2. Multiple duodenal diverticula. 3. Moderate coronary artery calcification.  4. Findings were called to Dr. Lara.    Dictated by (CST): Dustin Hughes MD on 10/29/2024 at 3:46 PM     Finalized by (CST): Dustin Hughes MD on 10/29/2024 at 4:00 PM          XR CHEST AP PORTABLE  (CPT=71045)    Result Date: 10/29/2024  PROCEDURE: XR CHEST AP PORTABLE  (CPT=71045) TIME: 13:18.   COMPARISON: [facility], XR CHEST AP PORTABLE (CPT=71045), 6/20/2024, 1:27 PM.  [facility], XR CHEST PA + LAT CHEST (CPT=71046), 1/08/2024, 11:33 AM.  INDICATIONS: Epigastric pain, nausea, and vomitting x4 days. Diarrhea x1 day.  TECHNIQUE:   Single view.   FINDINGS:  CARDIAC/VASC: The cardiomediastinal silhouette is unchanged in size.  There is atherosclerotic calcification of the thoracic aorta. MEDIAST/JENNIFER:   No visible mass or adenopathy. LUNGS/PLEURA: Small bibasilar opacities.  No pleural effusion.  No pneumothorax. BONES: Multilevel degenerative changes of the thoracic spine.  The thoracic compression fracture deformities were better demonstrated the prior radiograph dated 01/08/2024. OTHER: Negative.          CONCLUSION:   Small bibasilar opacities, which may reflect atelectasis with or without superimposed pneumonia.      Dictated by (CST): Lalo King MD on 10/29/2024 at 1:33 PM     Finalized by (CST): Lalo King MD on 10/29/2024 at 1:35 PM            Assessment/Plan:  Patient Active Problem List   Diagnosis    Osteoporosis    Contusion of right chest wall    Perforated diverticulum of duodenum    Duodenal ulcer with perforation (HCC)    Perforated duodenal ulcer (HCC)    Small bowel diverticular disease   Unable to perform contrast study due to old contrast    repeat am    DAVID TREVIÑO MD  11/5/2024  2:34 PM

## 2024-11-06 ENCOUNTER — APPOINTMENT (OUTPATIENT)
Dept: GENERAL RADIOLOGY | Facility: HOSPITAL | Age: 74
End: 2024-11-06
Attending: SPECIALIST
Payer: MEDICAID

## 2024-11-06 LAB
ALBUMIN SERPL-MCNC: 3.8 G/DL (ref 3.2–4.8)
ALBUMIN/GLOB SERPL: 1.5 {RATIO} (ref 1–2)
ALP LIVER SERPL-CCNC: 53 U/L
ALT SERPL-CCNC: 43 U/L
ANION GAP SERPL CALC-SCNC: 6 MMOL/L (ref 0–18)
ANION GAP SERPL CALC-SCNC: 6 MMOL/L (ref 0–18)
AST SERPL-CCNC: 31 U/L (ref ?–34)
BASOPHILS # BLD AUTO: 0.1 X10(3) UL (ref 0–0.2)
BASOPHILS NFR BLD AUTO: 1.3 %
BILIRUB SERPL-MCNC: 0.2 MG/DL (ref 0.2–1.1)
BUN BLD-MCNC: 16 MG/DL (ref 9–23)
BUN BLD-MCNC: 16 MG/DL (ref 9–23)
BUN/CREAT SERPL: 29.1 (ref 10–20)
BUN/CREAT SERPL: 29.1 (ref 10–20)
CALCIUM BLD-MCNC: 9.1 MG/DL (ref 8.7–10.4)
CALCIUM BLD-MCNC: 9.1 MG/DL (ref 8.7–10.4)
CHLORIDE SERPL-SCNC: 109 MMOL/L (ref 98–112)
CHLORIDE SERPL-SCNC: 109 MMOL/L (ref 98–112)
CO2 SERPL-SCNC: 25 MMOL/L (ref 21–32)
CO2 SERPL-SCNC: 25 MMOL/L (ref 21–32)
CREAT BLD-MCNC: 0.55 MG/DL
CREAT BLD-MCNC: 0.55 MG/DL
DEPRECATED RDW RBC AUTO: 42.4 FL (ref 35.1–46.3)
EGFRCR SERPLBLD CKD-EPI 2021: 97 ML/MIN/1.73M2 (ref 60–?)
EGFRCR SERPLBLD CKD-EPI 2021: 97 ML/MIN/1.73M2 (ref 60–?)
EOSINOPHIL # BLD AUTO: 0.3 X10(3) UL (ref 0–0.7)
EOSINOPHIL NFR BLD AUTO: 4 %
ERYTHROCYTE [DISTWIDTH] IN BLOOD BY AUTOMATED COUNT: 12.9 % (ref 11–15)
GLOBULIN PLAS-MCNC: 2.5 G/DL (ref 2–3.5)
GLUCOSE BLD-MCNC: 93 MG/DL (ref 70–99)
GLUCOSE BLD-MCNC: 93 MG/DL (ref 70–99)
HCT VFR BLD AUTO: 41.4 %
HGB BLD-MCNC: 14 G/DL
IMM GRANULOCYTES # BLD AUTO: 0.1 X10(3) UL (ref 0–1)
IMM GRANULOCYTES NFR BLD: 1.3 %
LYMPHOCYTES # BLD AUTO: 1.73 X10(3) UL (ref 1–4)
LYMPHOCYTES NFR BLD AUTO: 23.1 %
MAGNESIUM SERPL-MCNC: 2.1 MG/DL (ref 1.6–2.6)
MCH RBC QN AUTO: 30.6 PG (ref 26–34)
MCHC RBC AUTO-ENTMCNC: 33.8 G/DL (ref 31–37)
MCV RBC AUTO: 90.6 FL
MONOCYTES # BLD AUTO: 0.55 X10(3) UL (ref 0.1–1)
MONOCYTES NFR BLD AUTO: 7.4 %
NEUTROPHILS # BLD AUTO: 4.7 X10 (3) UL (ref 1.5–7.7)
NEUTROPHILS # BLD AUTO: 4.7 X10(3) UL (ref 1.5–7.7)
NEUTROPHILS NFR BLD AUTO: 62.9 %
OSMOLALITY SERPL CALC.SUM OF ELEC: 291 MOSM/KG (ref 275–295)
OSMOLALITY SERPL CALC.SUM OF ELEC: 291 MOSM/KG (ref 275–295)
PHOSPHATE SERPL-MCNC: 3.7 MG/DL (ref 2.4–5.1)
PLATELET # BLD AUTO: 372 10(3)UL (ref 150–450)
POTASSIUM SERPL-SCNC: 4.2 MMOL/L (ref 3.5–5.1)
POTASSIUM SERPL-SCNC: 4.2 MMOL/L (ref 3.5–5.1)
PROT SERPL-MCNC: 6.3 G/DL (ref 5.7–8.2)
RBC # BLD AUTO: 4.57 X10(6)UL
SODIUM SERPL-SCNC: 140 MMOL/L (ref 136–145)
SODIUM SERPL-SCNC: 140 MMOL/L (ref 136–145)
WBC # BLD AUTO: 7.5 X10(3) UL (ref 4–11)

## 2024-11-06 PROCEDURE — 99233 SBSQ HOSP IP/OBS HIGH 50: CPT | Performed by: HOSPITALIST

## 2024-11-06 PROCEDURE — 74240 X-RAY XM UPR GI TRC 1CNTRST: CPT | Performed by: SPECIALIST

## 2024-11-06 RX ORDER — FLUCONAZOLE 150 MG/1
150 TABLET ORAL ONCE
Status: COMPLETED | OUTPATIENT
Start: 2024-11-06 | End: 2024-11-06

## 2024-11-06 NOTE — PROGRESS NOTES
[facility]  part of [facility]    Progress Note    Dorys Pickens Patient Status:  Inpatient    1950 MRN [id number]   Location [facility] 4W/SW/SE Attending Rei Peralta MD   Hosp Day # 8 PCP Cristel Arias MD     Subjective:  Feels ok     Objective/Physical Exam:  General: Alert, orientated x3.  Cooperative.  No apparent distress.  Vital Signs:  Blood pressure 101/67, pulse 88, temperature 97.9 °F (36.6 °C), temperature source Oral, resp. rate 16, height 5' 1.42\" (1.56 m), weight 132 lb 9.6 oz (60.1 kg), SpO2 98%, not currently breastfeeding.  Labs:  Lab Results   Component Value Date    WBC 7.5 2024    HGB 14.0 2024    HCT 41.4 2024    .0 2024    CREATSERUM 0.55 2024    CREATSERUM 0.55 2024    BUN 16 2024    BUN 16 2024     2024     2024    K 4.2 2024    K 4.2 2024     2024     2024    CO2 25.0 2024    CO2 25.0 2024    GLU 93 2024    GLU 93 2024    CA 9.1 2024    CA 9.1 2024    ALB 3.8 2024    ALKPHO 53 (L) 2024    BILT 0.2 2024    TP 6.3 2024    AST 31 2024    ALT 43 2024    T4F 1.5 2024    TSH 2.029 2024    LIP 29 10/29/2024    MG 2.1 2024    PHOS 3.7 2024       Imaging:  XR ABDOMEN (1 VIEW) (CPT=74018)    Result Date: 2024  PROCEDURE: XR ABDOMEN (1 VIEW) (CPT=74018)  COMPARISON: [facility], XR ABDOMEN (1 VIEW) (CPT=74018), 2024, 9:51 AM.  INDICATIONS: Duodenal Perforation.  TECHNIQUE:   Single view.   FINDINGS:  BOWEL GAS PATTERN: There is still a moderate amount of contrast throughout the ascending, transverse and descending colon which would obscure the site of extravasation of contrast from the known duodenal diverticulum.  This will be attempted tomorrow once the contrast hopefully clears from the colon.  No bowel obstruction.  SOFT TISSUES: Normal.  No masses or organomegaly.  CALCIFICATIONS: None significant. BONES: Normal.  No significant arthritic changes.  OTHER: Negative.  No abnormal gaseous collections.          CONCLUSION:  1. Persistent moderate contrast in the colon obscuring the region of the duodenal diverticulum extravasation.  The examination will be attempted again tomorrow.    Dictated by (CST): Paul Aranda MD on 11/05/2024 at 1:29 PM     Finalized by (CST): Paul Aranda MD on 11/05/2024 at 1:30 PM          XR ABDOMEN (1 VIEW) (CPT=74018)    Result Date: 11/4/2024  PROCEDURE: XR ABDOMEN (1 VIEW) (CPT=74018)  COMPARISON: None.  INDICATIONS: Duodenal diverticulum - perforation.  TECHNIQUE:   Single view.   FINDINGS:  BOWEL GAS PATTERN: There is large amount of contrast in the ascending, transverse and proximal descending colon with the transverse colon contrast obscuring the region of the duodenal perforation, and the examination will be rescheduled for tomorrow once  the colon contrast has been evacuated. SOFT TISSUES: Normal.  No masses or organomegaly.  CALCIFICATIONS: None significant. BONES: Normal.  No significant arthritic changes.  OTHER: Negative.  No abnormal gaseous collections.          CONCLUSION:  1. There is a large amount of contrast in the ascending, transverse and proximal descending colon with the transverse colon contrast obscuring the region of the duodenal perforation , and the examination will be rescheduled for tomorrow once the colon contrast has been evacuated.    Dictated by (CST): Paul Aranda MD on 11/04/2024 at 12:14 PM     Finalized by (CST): Paul Aranda MD on 11/04/2024 at 12:16 PM          XR UGI/ESOPHAGUS SINGLE CONTRAST (CPT=74240)    Result Date: 10/31/2024  PROCEDURE: XR UGI/ESOPHAGUS SINGLE CONTRAST (CPT=74240)  COMPARISON: [facility], CT ABDOMEN + PELVIS (CONTRAST ONLY) (CPT=74177), 10/29/2024, 3:22 PM.   INDICATIONS: Contained perforation of the descending  duodenum on recent CT scan..  TECHNIQUE:   A single contrast esophagram and upper gastrointestinal series was performed with fluoroscopy in the usual manner.  The patient was inadvertently given a moderate amount of thin barium for this examination as opposed to water-soluble Gastrografin contrast.   FLUOROSCOPY IMAGES OBTAINED:  22 FLUOROSCOPY TIME:  2.6 minute RADIATION DOSE (Dose Area Product):  3830.1-uGy*m^2 FINDINGS:  ESOPHAGUS STRUCTURE:   Normal.  No visible obstruction, stricture or dilation.  MOTILITY:   Normal.  HERNIA:   None visible.  REFLUX:   None visible.  OTHER:   Negative.   UPPER GI STOMACH:   Poorly distended stomach because of lack of air contrast technique.  No well-defined mass or outlet obstruction. DUODENUM:   There is apparent extraluminal extravasation of contrast at the descending portion of the duodenum where there is a 2.3 x 2.4 cm collection of contrast which extends beyond the lumen of the duodenum, it is difficult to tell if this is all free contained extravasated contrast at a site of perforation or if there may be an underlying diverticulum with associated adjacent extravasation from a duodenal diverticulum perforation.  There is mild spasm poor distention of the descending portion of  the duodenal sweep.  There are 2 moderate-sized diverticula identified in transverse portion the duodenum with one measuring 2.9 x 3.1 cm, and adjacent 1 measuring 1.2 x 1.4 cm.  There does not appear to be extravasation or perforation from the 2 adjacent diverticula of the duodenal sweep. OTHER:   Negative.              CONCLUSION:  1. There is apparent extraluminal extravasation of contrast at the descending portion of the duodenal sweep just distal to the duodenal bulb which appears to be contained and overall measures about 2.3 x 2.4 cm, and it is difficult to tell if this is all  extraluminal contained contrast or if there may be a diverticulum at this site with adjacent extravasation of  contrast from a duodenal diverticular rupture.  There is no well-defined mass.  There is mild spasm and poor distention of the descending portion of the duodenal sweep.  Correlate clinically and with endoscopy.  There are two small to moderate adjacent diverticula noted of the transverse portion of the duodenal sweep without evidence of perforation or contrast extravasation. 2. Normal esophagus.  Normal appearance to the stomach which is poorly distended though.  No well-defined mass.    Dictated by (CST): Paul Aranda MD on 10/31/2024 at 1:24 PM     Finalized by (CST): Paul Aranda MD on 10/31/2024 at 1:38 PM          CT ABDOMEN+PELVIS(CONTRAST ONLY)(CPT=74177)    Result Date: 10/29/2024  PROCEDURE: CT ABDOMEN + PELVIS (CONTRAST ONLY) (CPT=74177)  COMPARISON: None.  INDICATIONS: Epigastric pain, nausea, and vomiting.  TECHNIQUE: CT images of the abdomen and pelvis were obtained with non-ionic intravenous contrast material.  Automated exposure control for dose reduction was used. Adjustment of the mA and/or kV was done based on the patient's size. Use of iterative reconstruction technique for dose reduction was used.  Dose information is transmitted to the ACR (American College of Radiology) NRDR (National Radiology Data Registry) which includes the Dose Index Registry.  FINDINGS:  LIVER: Too small to characterize hypoattenuating hepatic dome lesion. BILIARY: No evidence for biliary dilatation. Post cholecystectomy. SPLEEN: Normal.  PANCREAS: Normal.  ADRENALS: Normal. KIDNEYS: Normal. AORTA/VASCULAR: Atherosclerotic vascular calcification including coronary artery calcification. No aneurysm or dissection.  LYMPHADENOPATHY: None. GI/MESENTERY: Multiple duodenal diverticula with a contained perforation of the 2nd portion of the duodenum with surrounding inflammatory changes including small para duodenal fluid collections measuring up to 1.6 x 3.6 cm Normal appendix.  Colonic diverticulosis.  No obstruction, bowel  wall thickening, or mesenteric mass. ABDOMINAL WALL: Normal.  No mass or hernia.  URINARY BLADDER: Normal. ASCITES:   None. PELVIC ORGANS: No suspect pelvic mass. BONES: Mild-to-moderate chronic superior endplate compression fracture T8 and T10.  Minimal chronic anterior wedging of T9 and T11.  Schmorl's node associated with mild chronic superior endplate compression of L4.  No suspect bone lesion. LUNG BASES: Normal.  No visible pulmonary or pleural disease.  OTHER: Negative.          CONCLUSION:  1. Contained perforation 2nd portion of duodenum either related to perforated diverticulitis or ulcer.  Small para duodenal fluid collection measuring approximately 3 x 3.6 x 1.6 cm. 2. Multiple duodenal diverticula. 3. Moderate coronary artery calcification.  4. Findings were called to Dr. Lara.    Dictated by (CST): Dustin Hughes MD on 10/29/2024 at 3:46 PM     Finalized by (CST): Dustin Hughes MD on 10/29/2024 at 4:00 PM          XR CHEST AP PORTABLE  (CPT=71045)    Result Date: 10/29/2024  PROCEDURE: XR CHEST AP PORTABLE  (CPT=71045) TIME: 13:18.   COMPARISON: [facility], XR CHEST AP PORTABLE (CPT=71045), 6/20/2024, 1:27 PM.  [facility], XR CHEST PA + LAT CHEST (CPT=71046), 1/08/2024, 11:33 AM.  INDICATIONS: Epigastric pain, nausea, and vomitting x4 days. Diarrhea x1 day.  TECHNIQUE:   Single view.   FINDINGS:  CARDIAC/VASC: The cardiomediastinal silhouette is unchanged in size.  There is atherosclerotic calcification of the thoracic aorta. MEDIAST/JENNIFER:   No visible mass or adenopathy. LUNGS/PLEURA: Small bibasilar opacities.  No pleural effusion.  No pneumothorax. BONES: Multilevel degenerative changes of the thoracic spine.  The thoracic compression fracture deformities were better demonstrated the prior radiograph dated 01/08/2024. OTHER: Negative.          CONCLUSION:   Small bibasilar opacities, which may reflect atelectasis with or without superimposed pneumonia.     Dictated by  (CST): Lalo King MD on 10/29/2024 at 1:33 PM     Finalized by (CST): Lalo King MD on 10/29/2024 at 1:35 PM            Assessment/Plan:  Patient Active Problem List   Diagnosis    Osteoporosis    Contusion of right chest wall    Perforated diverticulum of duodenum    Duodenal ulcer with perforation (HCC)    Perforated duodenal ulcer (HCC)    Small bowel diverticular disease   Had stool   Some contrast in colon   Try enema  then contrast study to see if can feed    DAVID TREVIÑO MD  11/6/2024  9:17 AM

## 2024-11-06 NOTE — PLAN OF CARE
Patient is alert & oriented x4, primarily polish speaking. On room air. Vitals stable. TPN infusing through R PICC. Continues with IV abx. Denies pain or nausea. NPO with ice chips. Voiding freely. Passing gas. Up independently. Plan for UGI this AM. Will continue to monitor.       Problem: Patient Centered Care  Goal: Patient preferences are identified and integrated in the patient's plan of care  Description: Interventions:  - What would you like us to know as we care for you?   - Provide timely, complete, and accurate information to patient/family  - Incorporate patient and family knowledge, values, beliefs, and cultural backgrounds into the planning and delivery of care  - Encourage patient/family to participate in care and decision-making at the level they choose  - Honor patient and family perspectives and choices  Outcome: Progressing     Problem: Patient/Family Goals  Goal: Patient/Family Long Term Goal  Description: Patient's Long Term Goal:     Interventions:  -   - See additional Care Plan goals for specific interventions  Outcome: Progressing  Goal: Patient/Family Short Term Goal  Description: Patient's Short Term Goal:     Interventions:     - See additional Care Plan goals for specific interventions  Outcome: Progressing     Problem: RESPIRATORY - ADULT  Goal: Achieves optimal ventilation and oxygenation  Description: INTERVENTIONS:  - Assess for changes in respiratory status  - Assess for changes in mentation and behavior  - Position to facilitate oxygenation and minimize respiratory effort  - Oxygen supplementation based on oxygen saturation or ABGs  - Provide Smoking Cessation handout, if applicable  - Encourage broncho-pulmonary hygiene including cough, deep breathe, Incentive Spirometry  - Assess the need for suctioning and perform as needed  - Assess and instruct to report SOB or any respiratory difficulty  - Respiratory Therapy support as indicated  - Manage/alleviate anxiety  - Monitor for  signs/symptoms of CO2 retention  Outcome: Progressing     Problem: GASTROINTESTINAL - ADULT  Goal: Minimal or absence of nausea and vomiting  Description: INTERVENTIONS:  - Maintain adequate hydration with IV or PO as ordered and tolerated  - Nasogastric tube to low intermittent suction as ordered  - Evaluate effectiveness of ordered antiemetic medications  - Provide nonpharmacologic comfort measures as appropriate  - Advance diet as tolerated, if ordered  - Obtain nutritional consult as needed  - Evaluate fluid balance  Outcome: Progressing  Goal: Maintains or returns to baseline bowel function  Description: INTERVENTIONS:  - Assess bowel function  - Maintain adequate hydration with IV or PO as ordered and tolerated  - Evaluate effectiveness of GI medications  - Encourage mobilization and activity  - Obtain nutritional consult as needed  - Establish a toileting routine/schedule  - Consider collaborating with pharmacy to review patient's medication profile  Outcome: Progressing

## 2024-11-06 NOTE — PROGRESS NOTES
[facility]  [facility]     Dorys Pickens Patient Status:  Inpatient    1950 MRN [id number]   Location [facility] 4W/SW/SE Attending Arpit Hutchins MD   Hosp Day # 8 PCP Cirstel Arias MD     Subjective:     Patient resting comfortably in chair  Underwent EGD this AM, awaiting results  No cp, sob, f,c,n,v abd pain or HA     Objective:    Review of Systems:   ROS completed; pertinent positive and negatives stated in subjective.      Vital signs:  Temp:  [97.9 °F (36.6 °C)-98.4 °F (36.9 °C)] 97.9 °F (36.6 °C)  Pulse:  [] 108  Resp:  [16-18] 18  BP: (101-133)/(67-85) 133/85  SpO2:  [98 %-99 %] 99 %      Physical Exam:    Gen: NAD AO x3  Chest: good air entry CTABL  CVS: normal s1 and s2 RR  Abd: NABS soft NT ND  Neuro: CN 2-12 grossly intact  Ext: no edema in bilateral LE      Diagnostic Data:    Labs:  Recent Labs   Lab 24  0628 24  0436 24  0553 24  0523 24  0545   WBC 5.4 6.6 8.2 6.8 7.5   HGB 12.5 13.4 13.4 13.5 14.0   MCV 90.3 88.9 90.6 91.4 90.6   .0 283.0 317.0 331.0 372.0       Recent Labs   Lab 24  0553 24  0523 24  0546   *  107* 99  99 93  93   BUN 11  11 12  12 16  16   CREATSERUM 0.54*  0.54* 0.54*  0.54* 0.55  0.55   CA 9.2  9.2 9.2  9.2 9.1  9.1   ALB 3.8 3.7 3.8     142 143  143 140  140   K 4.1  4.1 4.4  4.4 4.2  4.2     110 111  111 109  109   CO2 26.0  26.0 26.0  26.0 25.0  25.0   ALKPHO 50* 48* 53*   AST 65* 55* 31   ALT 38 51* 43   BILT 0.2 0.2 0.2   TP 6.0 5.9 6.3       Estimated Creatinine Clearance: 70.2 mL/min (based on SCr of 0.55 mg/dL).    No results for input(s): \"PTP\", \"INR\" in the last 168 hours.           Imaging: Imaging data reviewed in Epic.    Medications:    levothyroxine  65 mcg Intravenous Daily    pantoprazole  40 mg Intravenous Q12H    cefTRIAXone  2 g Intravenous Q24H    metRONIDAZOLE  500 mg  Intravenous Q12H    nicotine  1 patch Transdermal Daily       Assessment & Plan:     Acute duodenal perforation  ?PUD  Imaging reviewed  Paraduodenal fluid collection and possible abscess  Contrast study showing extraluminal extravastation of the contrast  GI on consult  Underwent EGD, findings reviewed  UGI with gastrograffin done this AM  Continue PPI BID and IV abx  Gsx on consult  Continue IVF and IV abx  Continue TPN  Repeat contrast study as above  NPO at this time, TPN via PICC line started 11/01/2024  Continue IV Ceftriaxone and Flagyl  PRN pain control  Hypothyroidism  HLD  Resume home meds once tolerating PO intake      Plan of care discussed with patient or family at bedside.      Supplementary Documentation:     Quality:  DVT Prophylaxis: SCDs, resume DVT ppx if okay with Gsx      Estimated date of discharge: TBD  Discharge is dependent on: clinical stability  At this point Ms. Pickens is expected to be discharge to: home    MDM: High

## 2024-11-06 NOTE — PLAN OF CARE
A&ox4, primarily polish speaking. RA. No reports of pain or n/v. Fleet enema given prior to xray per MD. Xray completed today. TPN and IV abx to PICC in R arm. NPO w/ sips. Up independent. Call light in reach and using appropriately.      Problem: Patient Centered Care  Goal: Patient preferences are identified and integrated in the patient's plan of care  Description: Interventions:  - What would you like us to know as we care for you? I'm hungry  - Provide timely, complete, and accurate information to patient/family  - Incorporate patient and family knowledge, values, beliefs, and cultural backgrounds into the planning and delivery of care  - Encourage patient/family to participate in care and decision-making at the level they choose  - Honor patient and family perspectives and choices  Outcome: Progressing

## 2024-11-06 NOTE — PAYOR COMM NOTE
11/5 & 11/6  CONTINUED STAY REVIEW    Payor: [facility]  Subscriber #:  [id number]  Authorization Number: [id number]    Admit date: 10/29/24  Admit time:  5:21 PM          MEDICATIONS ADMINISTERED IN LAST 1 DAY:  adult 3 in 1 TPN       Date Action Dose Route User    11/5/2024 2046 New Bag (none) Intravenous Dereck Trevino RN          cefTRIAXone (Rocephin) 2 g in sodium chloride 0.9% 100 mL IVPB-ADDV       Date Action Dose Route User    11/5/2024 1632 New Bag 2 g Intravenous Dorys Schofield RN          levothyroxine (Synthroid) 100 MCG/5ML injection 65 mcg       Date Action Dose Route User    11/6/2024 0918 Given 65 mcg Intravenous Dorys Schofield RN          metRONIDAZOLE in sodium chloride 0.79% (Flagyl) 5 mg/mL IVPB premix 500 mg       Date Action Dose Route User    11/6/2024 0538 New Bag 500 mg Intravenous Dereck Trevino RN    11/5/2024 1742 New Bag 500 mg Intravenous Dorys Schofield RN          nicotine (Nicoderm CQ) 21 MG/24HR patch 1 patch       Date Action Dose Route User    11/6/2024 0918 Patch Applied 1 patch Transdermal (Left Upper Arm) Dorys Schofield RN          pantoprazole (Protonix) 40 mg in sodium chloride 0.9% PF 10 mL IV push       Date Action Dose Route User    11/6/2024 0538 Given 40 mg Intravenous Dereck Trevino RN    11/5/2024 1632 Given 40 mg Intravenous Dorys Schofield RN          zolpidem (Ambien) tab 5 mg       Date Action Dose Route User    11/5/2024 2050 Given 5 mg Oral Dereck Trevino RN            Vitals (last day)       Date/Time Temp Pulse Resp BP SpO2 Weight O2 Device O2 Flow Rate (L/min) Who    11/06/24 1225 97.9 °F (36.6 °C) 108 18 133/85 99 % -- None (Room air) -- RH    11/06/24 0540 97.9 °F (36.6 °C) 88 16 101/67 98 % -- None (Room air) -- IG    11/05/24 2045 98.4 °F (36.9 °C) 95 16 122/71 98 % -- None (Room air) -- IG    11/05/24 1221 98 °F (36.7 °C) 102 16 123/53 98 % -- None (Room air) -- RH    11/05/24 0338 98.3 °F (36.8 °C) 86 14 118/54 95 % --  None (Room air) -- KJ       11/5 HOSPITALIST NOTE  Hosp Day # 7 PCP Cristel Arias MD         Subjective:  Patient resting comfortably in bed. Denied any active complaints at the time of interview.  Repeat contrast study pending - will be done later today.  Patient in good spirits this morning.  No significant overnight events reported.      Vital signs:  Temp:  [97.8 °F (36.6 °C)-98.3 °F (36.8 °C)] 98.3 °F (36.8 °C)  Pulse:  [82-87] 86  Resp:  [14-18] 14  BP: (118-131)/(54-61) 118/54  SpO2:  [94 %-96 %] 95 %        Physical Exam:    Gen: NAD AO x3  Chest: good air entry CTABL  CVS: normal s1 and s2 RR  Abd: NABS soft NT ND  Neuro: CN 2-12 grossly intact  Ext: no edema in bilateral LE        Diagnostic Data:    Labs:          Recent Labs   Lab 11/01/24  0547 11/02/24  0628 11/03/24  0436 11/04/24  0553 11/05/24  0523   WBC 4.0 5.4 6.6 8.2 6.8   HGB 12.3 12.5 13.4 13.4 13.5   MCV 91.7 90.3 88.9 90.6 91.4   .0 284.0 283.0 317.0 331.0               Recent Labs   Lab 11/03/24  0436 11/04/24  0553 11/05/24  0523   GLU 93  93 107*  107* 99  99   BUN 11  11 11  11 12  12   CREATSERUM 0.51*  0.51* 0.54*  0.54* 0.54*  0.54*   CA 9.0  9.0 9.2  9.2 9.2  9.2   ALB 3.7 3.8 3.7     143 142  142 143  143   K 4.0  4.0 4.1  4.1 4.4  4.4     111 110  110 111  111   CO2 26.0  26.0 26.0  26.0 26.0  26.0   ALKPHO 48* 50* 48*   AST 28 65* 55*   ALT 15 38 51*   BILT 0.2 0.2 0.2   TP 5.9 6.0 5.9       Medications:   Scheduled Medications    levothyroxine  65 mcg Intravenous Daily    pantoprazole  40 mg Intravenous Q12H    cefTRIAXone  2 g Intravenous Q24H    metRONIDAZOLE  500 mg Intravenous Q12H    nicotine  1 patch Transdermal Daily               Assessment & Plan:  Acute duodenal perforation  ?PUD  Imaging reviewed  Paraduodenal fluid collection and possible abscess  Contrast study showing extraluminal extravastation of the contrast  GI on consult  No plans for EGD at this time  UGI with  gastrograffin done  Continue PPI BID and IV abx  Gsx on consult  Continue IVF and IV abx  Continue TPN  Repeat contrast study pending - expected to be done 11/5/2024  If negative may start the patient on CLD  NPO at this time, TPN via PICC line starting 11/01/2024  Continue IV Ceftriaxone and Flagyl  PRN pain control  Hypothyroidism  HLD  Resume home meds once tolerating PO intake    11/5 GEN SURGERY NOTE    Subjective:  Feels ok     Objective/Physical Exam:  General: Alert, orientated x3.  Cooperative.  No apparent distress.  Vital Signs:  Blood pressure 123/53, pulse 102, temperature 98 °F (36.7 °C), temperature source Oral, resp. rate 16, height 5' 1.42\" (1.56 m), weight 132 lb 9.6 oz (60.1 kg), SpO2 98%,          Assessment/Plan:      Patient Active Problem List   Diagnosis    Osteoporosis    Contusion of right chest wall    Perforated diverticulum of duodenum    Duodenal ulcer with perforation (HCC)    Perforated duodenal ulcer (HCC)    Small bowel diverticular disease   Unable to perform contrast study due to old contrast    repeat am     11/6 GEN SURGERY NOTE    Subjective:  Feels ok      Objective/Physical Exam:  General: Alert, orientated x3.  Cooperative.  No apparent distress.  Vital Signs:  Blood pressure 101/67, pulse 88, temperature 97.9 °F (36.6 °C), temperature source Oral, resp. rate 16, height 5' 1.42\" (1.56 m), weight 132 lb 9.6 oz (60.1 kg), SpO2 98%    Labs:        Lab Results   Component Value Date     WBC 7.5 11/06/2024     HGB 14.0 11/06/2024     HCT 41.4 11/06/2024     .0 11/06/2024     CREATSERUM 0.55 11/06/2024     CREATSERUM 0.55 11/06/2024     BUN 16 11/06/2024     BUN 16 11/06/2024      11/06/2024      11/06/2024     K 4.2 11/06/2024     K 4.2 11/06/2024      11/06/2024      11/06/2024     CO2 25.0 11/06/2024     CO2 25.0 11/06/2024     GLU 93 11/06/2024     GLU 93 11/06/2024     CA 9.1 11/06/2024     CA 9.1 11/06/2024     ALB 3.8 11/06/2024     ALKPHO 53  (L) 11/06/2024     BILT 0.2 11/06/2024     TP 6.3 11/06/2024     AST 31 11/06/2024     ALT 43 11/06/2024       Imaging:  XR ABDOMEN (1 VIEW) (CPT=74018)     Result Date: 11/5/2024  PROCEDURE:         XR ABDOMEN (1 VIEW) (CPT=74018)  COMPARISON:        [facility], XR ABDOMEN (1 VIEW) (CPT=74018), 11/04/2024, 9:51 AM.  INDICATIONS:      Duodenal Perforation.  TECHNIQUE:           Single view.   FINDINGS:     BOWEL GAS PATTERN:   There is still a moderate amount of contrast throughout the ascending, transverse and descending colon which would obscure the site of extravasation of contrast from the known duodenal diverticulum.  This will be attempted tomorrow once the contrast hopefully clears from the colon.  No bowel obstruction. SOFT TISSUES:           Normal.  No masses or organomegaly.  CALCIFICATIONS:      None significant. BONES:   Normal.  No significant arthritic changes.  OTHER:         Negative.  No abnormal gaseous collections.           CONCLUSION:         1. Persistent moderate contrast in the colon obscuring the region of the duodenal diverticulum extravasation.  The examination will be attempted again tomorrow.       Assessment/Plan:      Patient Active Problem List   Diagnosis    Osteoporosis    Contusion of right chest wall    Perforated diverticulum of duodenum    Duodenal ulcer with perforation (HCC)    Perforated duodenal ulcer (HCC)    Small bowel diverticular disease   Had stool   Some contrast in colon   Try enema  then contrast study to see if can feed

## 2024-11-07 LAB
ALBUMIN SERPL-MCNC: 3.4 G/DL (ref 3.2–4.8)
ALBUMIN/GLOB SERPL: 1.5 {RATIO} (ref 1–2)
ALP LIVER SERPL-CCNC: 48 U/L
ALT SERPL-CCNC: 28 U/L
ANION GAP SERPL CALC-SCNC: 7 MMOL/L (ref 0–18)
AST SERPL-CCNC: 16 U/L (ref ?–34)
BASOPHILS # BLD AUTO: 0.1 X10(3) UL (ref 0–0.2)
BASOPHILS NFR BLD AUTO: 1.4 %
BILIRUB SERPL-MCNC: 0.2 MG/DL (ref 0.2–1.1)
BUN BLD-MCNC: 18 MG/DL (ref 9–23)
BUN/CREAT SERPL: 35.3 (ref 10–20)
CALCIUM BLD-MCNC: 8.7 MG/DL (ref 8.7–10.4)
CHLORIDE SERPL-SCNC: 110 MMOL/L (ref 98–112)
CO2 SERPL-SCNC: 25 MMOL/L (ref 21–32)
CREAT BLD-MCNC: 0.51 MG/DL
DEPRECATED RDW RBC AUTO: 42.5 FL (ref 35.1–46.3)
EGFRCR SERPLBLD CKD-EPI 2021: 99 ML/MIN/1.73M2 (ref 60–?)
EOSINOPHIL # BLD AUTO: 0.22 X10(3) UL (ref 0–0.7)
EOSINOPHIL NFR BLD AUTO: 3 %
ERYTHROCYTE [DISTWIDTH] IN BLOOD BY AUTOMATED COUNT: 13 % (ref 11–15)
GLOBULIN PLAS-MCNC: 2.3 G/DL (ref 2–3.5)
GLUCOSE BLD-MCNC: 117 MG/DL (ref 70–99)
HCT VFR BLD AUTO: 38.5 %
HGB BLD-MCNC: 12.7 G/DL
IMM GRANULOCYTES # BLD AUTO: 0.11 X10(3) UL (ref 0–1)
IMM GRANULOCYTES NFR BLD: 1.5 %
LYMPHOCYTES # BLD AUTO: 1.39 X10(3) UL (ref 1–4)
LYMPHOCYTES NFR BLD AUTO: 19.2 %
MAGNESIUM SERPL-MCNC: 2.1 MG/DL (ref 1.6–2.6)
MCH RBC QN AUTO: 30 PG (ref 26–34)
MCHC RBC AUTO-ENTMCNC: 33 G/DL (ref 31–37)
MCV RBC AUTO: 90.8 FL
MONOCYTES # BLD AUTO: 0.49 X10(3) UL (ref 0.1–1)
MONOCYTES NFR BLD AUTO: 6.8 %
NEUTROPHILS # BLD AUTO: 4.93 X10 (3) UL (ref 1.5–7.7)
NEUTROPHILS # BLD AUTO: 4.93 X10(3) UL (ref 1.5–7.7)
NEUTROPHILS NFR BLD AUTO: 68.1 %
OSMOLALITY SERPL CALC.SUM OF ELEC: 297 MOSM/KG (ref 275–295)
PHOSPHATE SERPL-MCNC: 3.5 MG/DL (ref 2.4–5.1)
PLATELET # BLD AUTO: 324 10(3)UL (ref 150–450)
POTASSIUM SERPL-SCNC: 4 MMOL/L (ref 3.5–5.1)
PROT SERPL-MCNC: 5.7 G/DL (ref 5.7–8.2)
RBC # BLD AUTO: 4.24 X10(6)UL
SODIUM SERPL-SCNC: 142 MMOL/L (ref 136–145)
WBC # BLD AUTO: 7.2 X10(3) UL (ref 4–11)

## 2024-11-07 PROCEDURE — 99233 SBSQ HOSP IP/OBS HIGH 50: CPT | Performed by: HOSPITALIST

## 2024-11-07 NOTE — PLAN OF CARE
Patient alert, oriented, up walking in hallway, self, no c/o pain, continue NPO with ice chips and TPN, IV ABX , plan for CT tomorrow, family aware of plan of care.  Problem: Patient Centered Care  Goal: Patient preferences are identified and integrated in the patient's plan of care  Description: Interventions:  - What would you like us to know as we care for you? I am from home and speak Polish.   - Provide timely, complete, and accurate information to patient/family  - Incorporate patient and family knowledge, values, beliefs, and cultural backgrounds into the planning and delivery of care  - Encourage patient/family to participate in care and decision-making at the level they choose  - Honor patient and family perspectives and choices  Outcome: Progressing     Problem: Patient/Family Goals  Goal: Patient/Family Long Term Goal  Description: Patient's Long Term Goal: Discharge home     Interventions:  - Monitor vitals  - Monitor labs  - IV abx  - TPN  - NPO  - Gen sx on consult  - Daily weight  - See additional Care Plan goals for specific interventions  Outcome: Progressing  Goal: Patient/Family Short Term Goal  Description: Patient's Short Term Goal: Manage pain     Interventions:   - Frequent pain assessments  - Non-pharmacological interventions  - Pain medications as needed/indicated  - See additional Care Plan goals for specific interventions  Outcome: Progressing     Problem: PAIN - ADULT  Goal: Verbalizes/displays adequate comfort level or patient's stated pain goal  Description: INTERVENTIONS:  - Encourage pt to monitor pain and request assistance  - Assess pain using appropriate pain scale  - Administer analgesics based on type and severity of pain and evaluate response  - Implement non-pharmacological measures as appropriate and evaluate response  - Consider cultural and social influences on pain and pain management  - Manage/alleviate anxiety  - Utilize distraction and/or relaxation techniques  - Monitor  for opioid side effects  - Notify MD/LIP if interventions unsuccessful or patient reports new pain  - Anticipate increased pain with activity and pre-medicate as appropriate  Outcome: Progressing     Problem: SAFETY ADULT - FALL  Goal: Free from fall injury  Description: INTERVENTIONS:  - Assess pt frequently for physical needs  - Identify cognitive and physical deficits and behaviors that affect risk of falls.  - [place] fall precautions as indicated by assessment.  - Educate pt/family on patient safety including physical limitations  - Instruct pt to call for assistance with activity based on assessment  - Modify environment to reduce risk of injury  - Provide assistive devices as appropriate  - Consider OT/PT consult to assist with strengthening/mobility  - Encourage toileting schedule  Outcome: Progressing

## 2024-11-07 NOTE — PROGRESS NOTES
[facility]  [facility]     Dorys Pickens Patient Status:  Inpatient    1950 MRN [id number]   Location [facility] 4W/SW/SE Attending Rei Peralta MD   Hosp Day # 9 PCP Cristel Arias MD     Subjective:     Patient resting comfortably in chair  No acute distress  No cp, sob, f,c,n,v abd pain or HA     Objective:    Review of Systems:   ROS completed; pertinent positive and negatives stated in subjective.      Vital signs:  Temp:  [97.6 °F (36.4 °C)-98.6 °F (37 °C)] 97.6 °F (36.4 °C)  Pulse:  [80-91] 80  Resp:  [16-18] 16  BP: (121-136)/(51-63) 121/55  SpO2:  [98 %-99 %] 98 %      Physical Exam:    Gen: NAD AO x3  Chest: good air entry CTABL  CVS: normal s1 and s2 RR  Abd: NABS soft NT ND  Neuro: CN 2-12 grossly intact  Ext: no edema in bilateral LE      Diagnostic Data:    Labs:  Recent Labs   Lab 24  0436 24  0553 24  0523 24  0545 24  0459   WBC 6.6 8.2 6.8 7.5 7.2   HGB 13.4 13.4 13.5 14.0 12.7   MCV 88.9 90.6 91.4 90.6 90.8   .0 317.0 331.0 372.0 324.0       Recent Labs   Lab 24  0523 24  0546 24  0459   GLU 99  99 93  93 117*   BUN 12  12 16  16 18   CREATSERUM 0.54*  0.54* 0.55  0.55 0.51*   CA 9.2  9.2 9.1  9.1 8.7   ALB 3.7 3.8 3.4     143 140  140 142   K 4.4  4.4 4.2  4.2 4.0     111 109  109 110   CO2 26.0  26.0 25.0  25.0 25.0   ALKPHO 48* 53* 48*   AST 55* 31 16   ALT 51* 43 28   BILT 0.2 0.2 0.2   TP 5.9 6.3 5.7       Estimated Creatinine Clearance: 75.7 mL/min (A) (based on SCr of 0.51 mg/dL (L)).    No results for input(s): \"PTP\", \"INR\" in the last 168 hours.           Imaging: Imaging data reviewed in Epic.    Medications:    levothyroxine  65 mcg Intravenous Daily    pantoprazole  40 mg Intravenous Q12H    cefTRIAXone  2 g Intravenous Q24H    metRONIDAZOLE  500 mg Intravenous Q12H    nicotine  1 patch Transdermal Daily       Assessment &  Plan:     Acute duodenal perforation  ?PUD  Imaging reviewed  Paraduodenal fluid collection and possible abscess  Contrast study showing extraluminal extravastation of the contrast  GI on consult  Underwent EGD, findings reviewed  UGI with gastrograffin also complete, still showing extravasation  Continue PPI BID and IV abx  CT scan without oral contrast in AM, if still with leak she will need surgical repair   Gsx on consult  Continue IVF and IV abx  Continue TPN  Repeat contrast study as above  NPO at this time, TPN via PICC line started 11/01/2024  Continue IV Ceftriaxone and Flagyl  PRN pain control  Hypothyroidism  HLD  Resume home meds once tolerating PO intake      Plan of care discussed with patient or family at bedside.      Supplementary Documentation:     Quality:  DVT Prophylaxis: SCDs, resume DVT ppx if okay with Gsx      Estimated date of discharge: TBD  Discharge is dependent on: clinical stability  At this point Ms. Pickens is expected to be discharge to: home    MDM: High

## 2024-11-07 NOTE — PROGRESS NOTES
[facility]  part of [facility]    Progress Note    Dorys Pickens Patient Status:  Inpatient    1950 MRN [id number]   Location [facility] 4W/SW/SE Attending Rei Peralta MD   Hosp Day # 9 PCP Cristel Arias MD     Subjective:  Feels ok    Objective/Physical Exam:  General: Alert, orientated x3.  Cooperative.  No apparent distress.  Vital Signs:  Blood pressure 121/51, pulse 91, temperature 97.7 °F (36.5 °C), temperature source Oral, resp. rate 16, height 5' 1.42\" (1.56 m), weight 132 lb 9.6 oz (60.1 kg), SpO2 99%, not currently breastfeeding.  Abdomen:  grossly neg   Labs:  Lab Results   Component Value Date    WBC 7.2 2024    HGB 12.7 2024    HCT 38.5 2024    .0 2024    CREATSERUM 0.51 (L) 2024    BUN 18 2024     2024    K 4.0 2024     2024    CO2 25.0 2024     (H) 2024    CA 8.7 2024    ALB 3.4 2024    ALKPHO 48 (L) 2024    BILT 0.2 2024    TP 5.7 2024    AST 16 2024    ALT 28 2024    T4F 1.5 2024    TSH 2.029 2024    LIP 29 10/29/2024    MG 2.1 2024    PHOS 3.5 2024       Imaging:  XR UGI/ESOPHAGUS SINGLE CONTRAST (CPT=74240)    Result Date: 2024  PROCEDURE: XR UGI/ESOPHAGUS SINGLE CONTRAST (CPT=74240)  COMPARISON: [facility], XR UGI/ESOPHAGUS SINGLE CONTRAST (CPT=74240), 10/31/2024, 10:05 AM.   INDICATIONS: Duodenal perforation.  Follow-up for contained extravasation of contrast at the descending duodenal sweep seen on previous study of 10/31/2024.  TECHNIQUE:   A single contrast esophagram and upper gastrointestinal series was performed with fluoroscopy in the usual manner.   FLUOROSCOPY IMAGES OBTAINED:  19 FLUOROSCOPY TIME:  2.5 minute RADIATION DOSE (Dose Area Product):  3519.8-uGy*m^2    FINDINGS:    The preliminary  film of the abdomen still demonstrates residual contrast in the  ascending, transverse and descending colon which is slightly less than that seen previously .  ESOPHAGUS STRUCTURE:   Normal.  No visible obstruction, stricture or dilation.  MOTILITY:   Normal.  HERNIA:   None visible.  REFLUX:   None visible.  OTHER:   Negative.   UPPER GI STOMACH:   Normal.  No obstruction, mass or ulceration.  DUODENUM:   Persistent localized contrast extravasation at the medial aspect of the descending portion of the duodenal sweep which is about the same 2 perhaps slightly larger than that noted on the original study of 10/31/2024, and overall measures maximally about 5.1 x 3.8 cm and again suggests a contained site of perforation.  Possibility of this representing a large atypical diverticulum cannot entirely be excluded.  Again noted is a stable diverticulum of the transverse portion the duodenal sweep measuring 3.4 x 3.3 cm. OTHER:   Negative.              CONCLUSION:  1. Normal esophagus and stomach. 2. Persistent localized contrast extravasation at the medial aspect of the descending portion of the duodenal sweep which is about the same to perhaps slightly larger than that seen on the original study of 10/31/2024 as described above.  The possibility  of this representing a large atypical diverticulum cannot entirely be excluded.  See above.    Dictated by (CST): Paul Aranda MD on 11/06/2024 at 1:09 PM     Finalized by (CST): Paul Aranda MD on 11/06/2024 at 1:16 PM          XR ABDOMEN (1 VIEW) (CPT=74018)    Result Date: 11/5/2024  PROCEDURE: XR ABDOMEN (1 VIEW) (CPT=74018)  COMPARISON: [facility], XR ABDOMEN (1 VIEW) (CPT=74018), 11/04/2024, 9:51 AM.  INDICATIONS: Duodenal Perforation.  TECHNIQUE:   Single view.   FINDINGS:  BOWEL GAS PATTERN: There is still a moderate amount of contrast throughout the ascending, transverse and descending colon which would obscure the site of extravasation of contrast from the known duodenal diverticulum.  This will be attempted  tomorrow once the contrast hopefully clears from the colon.  No bowel obstruction. SOFT TISSUES: Normal.  No masses or organomegaly.  CALCIFICATIONS: None significant. BONES: Normal.  No significant arthritic changes.  OTHER: Negative.  No abnormal gaseous collections.          CONCLUSION:  1. Persistent moderate contrast in the colon obscuring the region of the duodenal diverticulum extravasation.  The examination will be attempted again tomorrow.    Dictated by (CST): Paul Aranda MD on 11/05/2024 at 1:29 PM     Finalized by (CST): Paul Aranda MD on 11/05/2024 at 1:30 PM          XR ABDOMEN (1 VIEW) (CPT=74018)    Result Date: 11/4/2024  PROCEDURE: XR ABDOMEN (1 VIEW) (CPT=74018)  COMPARISON: None.  INDICATIONS: Duodenal diverticulum - perforation.  TECHNIQUE:   Single view.   FINDINGS:  BOWEL GAS PATTERN: There is large amount of contrast in the ascending, transverse and proximal descending colon with the transverse colon contrast obscuring the region of the duodenal perforation, and the examination will be rescheduled for tomorrow once  the colon contrast has been evacuated. SOFT TISSUES: Normal.  No masses or organomegaly.  CALCIFICATIONS: None significant. BONES: Normal.  No significant arthritic changes.  OTHER: Negative.  No abnormal gaseous collections.          CONCLUSION:  1. There is a large amount of contrast in the ascending, transverse and proximal descending colon with the transverse colon contrast obscuring the region of the duodenal perforation , and the examination will be rescheduled for tomorrow once the colon contrast has been evacuated.    Dictated by (CST): Paul Aranda MD on 11/04/2024 at 12:14 PM     Finalized by (CST): Paul Aranda MD on 11/04/2024 at 12:16 PM          XR UGI/ESOPHAGUS SINGLE CONTRAST (CPT=74240)    Result Date: 10/31/2024  PROCEDURE: XR UGI/ESOPHAGUS SINGLE CONTRAST (CPT=74240)  COMPARISON: [facility], CT ABDOMEN + PELVIS (CONTRAST ONLY)  (CPT=74177), 10/29/2024, 3:22 PM.   INDICATIONS: Contained perforation of the descending duodenum on recent CT scan..  TECHNIQUE:   A single contrast esophagram and upper gastrointestinal series was performed with fluoroscopy in the usual manner.  The patient was inadvertently given a moderate amount of thin barium for this examination as opposed to water-soluble Gastrografin contrast.   FLUOROSCOPY IMAGES OBTAINED:  22 FLUOROSCOPY TIME:  2.6 minute RADIATION DOSE (Dose Area Product):  3830.1-uGy*m^2 FINDINGS:  ESOPHAGUS STRUCTURE:   Normal.  No visible obstruction, stricture or dilation.  MOTILITY:   Normal.  HERNIA:   None visible.  REFLUX:   None visible.  OTHER:   Negative.   UPPER GI STOMACH:   Poorly distended stomach because of lack of air contrast technique.  No well-defined mass or outlet obstruction. DUODENUM:   There is apparent extraluminal extravasation of contrast at the descending portion of the duodenum where there is a 2.3 x 2.4 cm collection of contrast which extends beyond the lumen of the duodenum, it is difficult to tell if this is all free contained extravasated contrast at a site of perforation or if there may be an underlying diverticulum with associated adjacent extravasation from a duodenal diverticulum perforation.  There is mild spasm poor distention of the descending portion of  the duodenal sweep.  There are 2 moderate-sized diverticula identified in transverse portion the duodenum with one measuring 2.9 x 3.1 cm, and adjacent 1 measuring 1.2 x 1.4 cm.  There does not appear to be extravasation or perforation from the 2 adjacent diverticula of the duodenal sweep. OTHER:   Negative.              CONCLUSION:  1. There is apparent extraluminal extravasation of contrast at the descending portion of the duodenal sweep just distal to the duodenal bulb which appears to be contained and overall measures about 2.3 x 2.4 cm, and it is difficult to tell if this is all  extraluminal contained  contrast or if there may be a diverticulum at this site with adjacent extravasation of contrast from a duodenal diverticular rupture.  There is no well-defined mass.  There is mild spasm and poor distention of the descending portion of the duodenal sweep.  Correlate clinically and with endoscopy.  There are two small to moderate adjacent diverticula noted of the transverse portion of the duodenal sweep without evidence of perforation or contrast extravasation. 2. Normal esophagus.  Normal appearance to the stomach which is poorly distended though.  No well-defined mass.    Dictated by (CST): Paul Aranda MD on 10/31/2024 at 1:24 PM     Finalized by (CST): Paul Aranda MD on 10/31/2024 at 1:38 PM          CT ABDOMEN+PELVIS(CONTRAST ONLY)(CPT=74177)    Result Date: 10/29/2024  PROCEDURE: CT ABDOMEN + PELVIS (CONTRAST ONLY) (CPT=74177)  COMPARISON: None.  INDICATIONS: Epigastric pain, nausea, and vomiting.  TECHNIQUE: CT images of the abdomen and pelvis were obtained with non-ionic intravenous contrast material.  Automated exposure control for dose reduction was used. Adjustment of the mA and/or kV was done based on the patient's size. Use of iterative reconstruction technique for dose reduction was used.  Dose information is transmitted to the ACR (American College of Radiology) NRDR (National Radiology Data Registry) which includes the Dose Index Registry.  FINDINGS:  LIVER: Too small to characterize hypoattenuating hepatic dome lesion. BILIARY: No evidence for biliary dilatation. Post cholecystectomy. SPLEEN: Normal.  PANCREAS: Normal.  ADRENALS: Normal. KIDNEYS: Normal. AORTA/VASCULAR: Atherosclerotic vascular calcification including coronary artery calcification. No aneurysm or dissection.  LYMPHADENOPATHY: None. GI/MESENTERY: Multiple duodenal diverticula with a contained perforation of the 2nd portion of the duodenum with surrounding inflammatory changes including small para duodenal fluid collections  measuring up to 1.6 x 3.6 cm Normal appendix.  Colonic diverticulosis.  No obstruction, bowel wall thickening, or mesenteric mass. ABDOMINAL WALL: Normal.  No mass or hernia.  URINARY BLADDER: Normal. ASCITES:   None. PELVIC ORGANS: No suspect pelvic mass. BONES: Mild-to-moderate chronic superior endplate compression fracture T8 and T10.  Minimal chronic anterior wedging of T9 and T11.  Schmorl's node associated with mild chronic superior endplate compression of L4.  No suspect bone lesion. LUNG BASES: Normal.  No visible pulmonary or pleural disease.  OTHER: Negative.          CONCLUSION:  1. Contained perforation 2nd portion of duodenum either related to perforated diverticulitis or ulcer.  Small para duodenal fluid collection measuring approximately 3 x 3.6 x 1.6 cm. 2. Multiple duodenal diverticula. 3. Moderate coronary artery calcification.  4. Findings were called to Dr. Lara.    Dictated by (CST): Dustin Hughes MD on 10/29/2024 at 3:46 PM     Finalized by (CST): Dustin Hughes MD on 10/29/2024 at 4:00 PM          XR CHEST AP PORTABLE  (CPT=71045)    Result Date: 10/29/2024  PROCEDURE: XR CHEST AP PORTABLE  (CPT=71045) TIME: 13:18.   COMPARISON: [facility], XR CHEST AP PORTABLE (CPT=71045), 6/20/2024, 1:27 PM.  [facility], XR CHEST PA + LAT CHEST (CPT=71046), 1/08/2024, 11:33 AM.  INDICATIONS: Epigastric pain, nausea, and vomitting x4 days. Diarrhea x1 day.  TECHNIQUE:   Single view.   FINDINGS:  CARDIAC/VASC: The cardiomediastinal silhouette is unchanged in size.  There is atherosclerotic calcification of the thoracic aorta. MEDIAST/JENNIFER:   No visible mass or adenopathy. LUNGS/PLEURA: Small bibasilar opacities.  No pleural effusion.  No pneumothorax. BONES: Multilevel degenerative changes of the thoracic spine.  The thoracic compression fracture deformities were better demonstrated the prior radiograph dated 01/08/2024. OTHER: Negative.          CONCLUSION:   Small bibasilar  opacities, which may reflect atelectasis with or without superimposed pneumonia.     Dictated by (CST): Lalo King MD on 10/29/2024 at 1:33 PM     Finalized by (CST): Lalo King MD on 10/29/2024 at 1:35 PM            Assessment/Plan:  Patient Active Problem List   Diagnosis    Osteoporosis    Contusion of right chest wall    Perforated diverticulum of duodenum    Duodenal ulcer with perforation (HCC)    Perforated duodenal ulcer (HCC)    Small bowel diverticular disease   CT  still possible leak    Long discussion with Dr. Aranda (radiology)  Difficult to tell if on-going leak or starting to heal  He suggested CT tomorrow with no oral contrast  If continued leak then will need surgical repair  Discussed with pt and daughter    DAVID TREVIÑO MD  11/7/2024  10:20 AM

## 2024-11-07 NOTE — PLAN OF CARE
Patient is alert and oriented x4. Patient ambulates independently. Patient is on room air. Patient is a daily weight. Patient is NPO. Patient is on TPN @ 83.3 ml/hr. Patient is on IV abx. Patient has a nicotine patch to the left arm. PRN ambien x1 administered for sleep per pt request. Patient complained of vaginal itching, MD notified. One time dose of po diflucan was ordered and administered. Patient is voiding freely. Safety measures are in place.       Problem: Patient Centered Care  Goal: Patient preferences are identified and integrated in the patient's plan of care  Description: Interventions:  - What would you like us to know as we care for you? I am from home and speak [place].   - Provide timely, complete, and accurate information to patient/family  - Incorporate patient and family knowledge, values, beliefs, and cultural backgrounds into the planning and delivery of care  - Encourage patient/family to participate in care and decision-making at the level they choose  - Honor patient and family perspectives and choices  Outcome: Progressing     Problem: Patient/Family Goals  Goal: Patient/Family Long Term Goal  Description: Patient's Long Term Goal: Discharge home     Interventions:  - Monitor vitals  - Monitor labs  - IV abx  - TPN  - NPO  - Gen sx on consult  - Daily weight  - See additional Care Plan goals for specific interventions  Outcome: Progressing  Goal: Patient/Family Short Term Goal  Description: Patient's Short Term Goal: Manage pain     Interventions:   - Frequent pain assessments  - Non-pharmacological interventions  - Pain medications as needed/indicated  - See additional Care Plan goals for specific interventions  Outcome: Progressing     Problem: GASTROINTESTINAL - ADULT  Goal: Minimal or absence of nausea and vomiting  Description: INTERVENTIONS:  - Maintain adequate hydration with IV or PO as ordered and tolerated  - Nasogastric tube to low intermittent suction as ordered  - Evaluate  effectiveness of ordered antiemetic medications  - Provide nonpharmacologic comfort measures as appropriate  - Advance diet as tolerated, if ordered  - Obtain nutritional consult as needed  - Evaluate fluid balance  Outcome: Progressing  Goal: Maintains or returns to baseline bowel function  Description: INTERVENTIONS:  - Assess bowel function  - Maintain adequate hydration with IV or PO as ordered and tolerated  - Evaluate effectiveness of GI medications  - Encourage mobilization and activity  - Obtain nutritional consult as needed  - Establish a toileting routine/schedule  - Consider collaborating with pharmacy to review patient's medication profile  Outcome: Progressing     Problem: PAIN - ADULT  Goal: Verbalizes/displays adequate comfort level or patient's stated pain goal  Description: INTERVENTIONS:  - Encourage pt to monitor pain and request assistance  - Assess pain using appropriate pain scale  - Administer analgesics based on type and severity of pain and evaluate response  - Implement non-pharmacological measures as appropriate and evaluate response  - Consider cultural and social influences on pain and pain management  - Manage/alleviate anxiety  - Utilize distraction and/or relaxation techniques  - Monitor for opioid side effects  - Notify MD/LIP if interventions unsuccessful or patient reports new pain  - Anticipate increased pain with activity and pre-medicate as appropriate  Outcome: Progressing     Problem: RISK FOR INFECTION - ADULT  Goal: Absence of fever/infection during anticipated neutropenic period  Description: INTERVENTIONS  - Monitor WBC  - Administer growth factors as ordered  - Implement neutropenic guidelines  Outcome: Progressing     Problem: SAFETY ADULT - FALL  Goal: Free from fall injury  Description: INTERVENTIONS:  - Assess pt frequently for physical needs  - Identify cognitive and physical deficits and behaviors that affect risk of falls.  - [place] fall precautions as indicated  by assessment.  - Educate pt/family on patient safety including physical limitations  - Instruct pt to call for assistance with activity based on assessment  - Modify environment to reduce risk of injury  - Provide assistive devices as appropriate  - Consider OT/PT consult to assist with strengthening/mobility  - Encourage toileting schedule  Outcome: Progressing     Problem: DISCHARGE PLANNING  Goal: Discharge to home or other facility with appropriate resources  Description: INTERVENTIONS:  - Identify barriers to discharge w/pt and caregiver  - Include patient/family/discharge partner in discharge planning  - Arrange for needed discharge resources and transportation as appropriate  - Identify discharge learning needs (meds, wound care, etc)  - Arrange for interpreters to assist at discharge as needed  - Consider post-discharge preferences of patient/family/discharge partner  - Complete POLST form as appropriate  - Assess patient's ability to be responsible for managing their own health  - Refer to Case Management Department for coordinating discharge planning if the patient needs post-hospital services based on physician/LIP order or complex needs related to functional status, cognitive ability or social support system  Outcome: Progressing     Problem: Altered Communication/Language Barrier  Goal: Patient/Family is able to understand and participate in their care  Description: Interventions:  - Assess communication ability and preferred communication style  - Implement communication aides and strategies  - Use visual cues when possible  - Listen attentively, be patient, do not interrupt  - Minimize distractions  - Allow time for understanding and response  - Establish method for patient to ask for assistance (call light)  - Provide an  as needed  - Communicate barriers and strategies to overcome with those who interact with patient  Outcome: Progressing

## 2024-11-08 ENCOUNTER — APPOINTMENT (OUTPATIENT)
Dept: CT IMAGING | Facility: HOSPITAL | Age: 74
End: 2024-11-08
Attending: SPECIALIST
Payer: MEDICAID

## 2024-11-08 LAB
ALBUMIN SERPL-MCNC: 3.6 G/DL (ref 3.2–4.8)
ANION GAP SERPL CALC-SCNC: 7 MMOL/L (ref 0–18)
BASOPHILS # BLD AUTO: 0.08 X10(3) UL (ref 0–0.2)
BASOPHILS NFR BLD AUTO: 1.1 %
BUN BLD-MCNC: 13 MG/DL (ref 9–23)
BUN/CREAT SERPL: 25.5 (ref 10–20)
CALCIUM BLD-MCNC: 8.9 MG/DL (ref 8.7–10.4)
CHLORIDE SERPL-SCNC: 111 MMOL/L (ref 98–112)
CO2 SERPL-SCNC: 24 MMOL/L (ref 21–32)
CREAT BLD-MCNC: 0.51 MG/DL
DEPRECATED RDW RBC AUTO: 42.5 FL (ref 35.1–46.3)
EGFRCR SERPLBLD CKD-EPI 2021: 99 ML/MIN/1.73M2 (ref 60–?)
EOSINOPHIL # BLD AUTO: 0.23 X10(3) UL (ref 0–0.7)
EOSINOPHIL NFR BLD AUTO: 3.2 %
ERYTHROCYTE [DISTWIDTH] IN BLOOD BY AUTOMATED COUNT: 12.9 % (ref 11–15)
GLUCOSE BLD-MCNC: 104 MG/DL (ref 70–99)
HCT VFR BLD AUTO: 38.7 %
HGB BLD-MCNC: 13.3 G/DL
IMM GRANULOCYTES # BLD AUTO: 0.08 X10(3) UL (ref 0–1)
IMM GRANULOCYTES NFR BLD: 1.1 %
LYMPHOCYTES # BLD AUTO: 1.7 X10(3) UL (ref 1–4)
LYMPHOCYTES NFR BLD AUTO: 23.5 %
MCH RBC QN AUTO: 31.4 PG (ref 26–34)
MCHC RBC AUTO-ENTMCNC: 34.4 G/DL (ref 31–37)
MCV RBC AUTO: 91.5 FL
MONOCYTES # BLD AUTO: 0.48 X10(3) UL (ref 0.1–1)
MONOCYTES NFR BLD AUTO: 6.6 %
NEUTROPHILS # BLD AUTO: 4.65 X10 (3) UL (ref 1.5–7.7)
NEUTROPHILS # BLD AUTO: 4.65 X10(3) UL (ref 1.5–7.7)
NEUTROPHILS NFR BLD AUTO: 64.5 %
OSMOLALITY SERPL CALC.SUM OF ELEC: 294 MOSM/KG (ref 275–295)
PHOSPHATE SERPL-MCNC: 3.3 MG/DL (ref 2.4–5.1)
PLATELET # BLD AUTO: 307 10(3)UL (ref 150–450)
POTASSIUM SERPL-SCNC: 4.3 MMOL/L (ref 3.5–5.1)
RBC # BLD AUTO: 4.23 X10(6)UL
SODIUM SERPL-SCNC: 142 MMOL/L (ref 136–145)
WBC # BLD AUTO: 7.2 X10(3) UL (ref 4–11)

## 2024-11-08 PROCEDURE — 99233 SBSQ HOSP IP/OBS HIGH 50: CPT | Performed by: HOSPITALIST

## 2024-11-08 PROCEDURE — 74177 CT ABD & PELVIS W/CONTRAST: CPT | Performed by: SPECIALIST

## 2024-11-08 RX ORDER — ACETAMINOPHEN 10 MG/ML
1000 INJECTION, SOLUTION INTRAVENOUS EVERY 6 HOURS PRN
Status: DISCONTINUED | OUTPATIENT
Start: 2024-11-08 | End: 2024-11-13

## 2024-11-08 NOTE — PLAN OF CARE
Patient alert and orientated x4, on room air, and ambulating independently. Pt on TPN running at 83.3mL/hr, and voiding freely. Pt on IV abx rocephin and flagyl. No complaints of pain overnight. PRN Ambien given for sleep per pt request.  Safety precautions maintained and call light within reach.     Problem: Patient Centered Care  Goal: Patient preferences are identified and integrated in the patient's plan of care  Description: Interventions:  - What would you like us to know as we care for you? I am from home and speak [place].   - Provide timely, complete, and accurate information to patient/family  - Incorporate patient and family knowledge, values, beliefs, and cultural backgrounds into the planning and delivery of care  - Encourage patient/family to participate in care and decision-making at the level they choose  - Honor patient and family perspectives and choices  Outcome: Progressing     Problem: Patient/Family Goals  Goal: Patient/Family Long Term Goal  Description: Patient's Long Term Goal: Discharge home     Interventions:  - Monitor vitals  - Monitor labs  - IV abx  - TPN  - NPO  - Gen sx on consult  - Daily weight  - See additional Care Plan goals for specific interventions  Outcome: Progressing  Goal: Patient/Family Short Term Goal  Description: Patient's Short Term Goal: Manage pain     Interventions:   - Frequent pain assessments  - Non-pharmacological interventions  - Pain medications as needed/indicated  - See additional Care Plan goals for specific interventions  Outcome: Progressing     Problem: GASTROINTESTINAL - ADULT  Goal: Minimal or absence of nausea and vomiting  Description: INTERVENTIONS:  - Maintain adequate hydration with IV or PO as ordered and tolerated  - Nasogastric tube to low intermittent suction as ordered  - Evaluate effectiveness of ordered antiemetic medications  - Provide nonpharmacologic comfort measures as appropriate  - Advance diet as tolerated, if ordered  - Obtain  nutritional consult as needed  - Evaluate fluid balance  Outcome: Progressing  Goal: Maintains or returns to baseline bowel function  Description: INTERVENTIONS:  - Assess bowel function  - Maintain adequate hydration with IV or PO as ordered and tolerated  - Evaluate effectiveness of GI medications  - Encourage mobilization and activity  - Obtain nutritional consult as needed  - Establish a toileting routine/schedule  - Consider collaborating with pharmacy to review patient's medication profile  Outcome: Progressing     Problem: PAIN - ADULT  Goal: Verbalizes/displays adequate comfort level or patient's stated pain goal  Description: INTERVENTIONS:  - Encourage pt to monitor pain and request assistance  - Assess pain using appropriate pain scale  - Administer analgesics based on type and severity of pain and evaluate response  - Implement non-pharmacological measures as appropriate and evaluate response  - Consider cultural and social influences on pain and pain management  - Manage/alleviate anxiety  - Utilize distraction and/or relaxation techniques  - Monitor for opioid side effects  - Notify MD/LIP if interventions unsuccessful or patient reports new pain  - Anticipate increased pain with activity and pre-medicate as appropriate  Outcome: Progressing     Problem: RISK FOR INFECTION - ADULT  Goal: Absence of fever/infection during anticipated neutropenic period  Description: INTERVENTIONS  - Monitor WBC  - Administer growth factors as ordered  - Implement neutropenic guidelines  Outcome: Progressing     Problem: SAFETY ADULT - FALL  Goal: Free from fall injury  Description: INTERVENTIONS:  - Assess pt frequently for physical needs  - Identify cognitive and physical deficits and behaviors that affect risk of falls.  - [place] fall precautions as indicated by assessment.  - Educate pt/family on patient safety including physical limitations  - Instruct pt to call for assistance with activity based on  assessment  - Modify environment to reduce risk of injury  - Provide assistive devices as appropriate  - Consider OT/PT consult to assist with strengthening/mobility  - Encourage toileting schedule  Outcome: Progressing     Problem: DISCHARGE PLANNING  Goal: Discharge to home or other facility with appropriate resources  Description: INTERVENTIONS:  - Identify barriers to discharge w/pt and caregiver  - Include patient/family/discharge partner in discharge planning  - Arrange for needed discharge resources and transportation as appropriate  - Identify discharge learning needs (meds, wound care, etc)  - Arrange for interpreters to assist at discharge as needed  - Consider post-discharge preferences of patient/family/discharge partner  - Complete POLST form as appropriate  - Assess patient's ability to be responsible for managing their own health  - Refer to Case Management Department for coordinating discharge planning if the patient needs post-hospital services based on physician/LIP order or complex needs related to functional status, cognitive ability or social support system  Outcome: Progressing     Problem: Altered Communication/Language Barrier  Goal: Patient/Family is able to understand and participate in their care  Description: Interventions:  - Assess communication ability and preferred communication style  - Implement communication aides and strategies  - Use visual cues when possible  - Listen attentively, be patient, do not interrupt  - Minimize distractions  - Allow time for understanding and response  - Establish method for patient to ask for assistance (call light)  - Provide an  as needed  - Communicate barriers and strategies to overcome with those who interact with patient  Outcome: Progressing

## 2024-11-08 NOTE — PROGRESS NOTES
[facility]  part of [facility] Progress [facility]     Dorys Pickens Patient Status:  Inpatient    1950 MRN [id number]   Location [facility] 4W/SW/SE Attending Rei Peralta MD   Hosp Day # 10 PCP Cristel Arias MD     Subjective:     Patient resting comfortably in chair  No acute distress  No cp, sob, f,c,n,v abd pain or HA   Tolerating sips of liquids     Objective:    Review of Systems:   ROS completed; pertinent positive and negatives stated in subjective.      Vital signs:  Temp:  [97.6 °F (36.4 °C)-98 °F (36.7 °C)] 97.8 °F (36.6 °C)  Pulse:  [80-84] 84  Resp:  [16] 16  BP: (121-143)/(55-65) 121/60  SpO2:  [97 %-98 %] 97 %      Physical Exam:    Gen: NAD AO x3  Chest: good air entry CTABL  CVS: normal s1 and s2 RR  Abd: NABS soft NT ND  Neuro: CN 2-12 grossly intact  Ext: no edema in bilateral LE      Diagnostic Data:    Labs:  Recent Labs   Lab 24  0553 24  0523 24  0545 24  0521   WBC 8.2 6.8 7.5 7.2 7.2   HGB 13.4 13.5 14.0 12.7 13.3   MCV 90.6 91.4 90.6 90.8 91.5   .0 331.0 372.0 324.0 307.0       Recent Labs   Lab 24  0523 24  0546 24  0521   GLU 99  99 93  93 117* 104*   BUN 12  12 16  16 18 13   CREATSERUM 0.54*  0.54* 0.55  0.55 0.51* 0.51*   CA 9.2  9.2 9.1  9.1 8.7 8.9   ALB 3.7 3.8 3.4 3.6     143 140  140 142 142   K 4.4  4.4 4.2  4.2 4.0 4.3     111 109  109 110 111   CO2 26.0  26.0 25.0  25.0 25.0 24.0   ALKPHO 48* 53* 48*  --    AST 55* 31 16  --    ALT 51* 43 28  --    BILT 0.2 0.2 0.2  --    TP 5.9 6.3 5.7  --        Estimated Creatinine Clearance: 75.7 mL/min (A) (based on SCr of 0.51 mg/dL (L)).    No results for input(s): \"PTP\", \"INR\" in the last 168 hours.           Imaging: Imaging data reviewed in Epic.    Medications:    levothyroxine  65 mcg Intravenous Daily    pantoprazole  40 mg Intravenous Q12H    cefTRIAXone  2 g  Intravenous Q24H    metRONIDAZOLE  500 mg Intravenous Q12H    nicotine  1 patch Transdermal Daily       Assessment & Plan:     Acute duodenal perforation  ?PUD  Imaging reviewed  Paraduodenal fluid collection and possible abscess  Contrast study showing extraluminal extravastation of the contrast  GI on consult  Underwent EGD, findings reviewed  UGI with gastrograffin also complete, still showing extravasation  Continue PPI BID and IV abx  CT scan today showing improvement in contained perforation  Gsx on consult  Continue IVF and IV abx  Continue TPN  Repeat contrast study as above  NPO at this time, TPN via PICC line started 11/01/2024  Continue IV Ceftriaxone and Flagyl  PRN pain control  Hypothyroidism  HLD  Will restart meds      Plan of care discussed with patient or family at bedside.      Supplementary Documentation:     Quality:  DVT Prophylaxis: will start heparin      Estimated date of discharge: TBD  Discharge is dependent on: clinical stability  At this point Ms. Pickens is expected to be discharge to: home    MDM: High

## 2024-11-08 NOTE — PLAN OF CARE
Patient alert, oriented, up walking in hallway, no c/o pain, results of CT done in AM available, continue TPN and sips of clears, continue IV ABX  Problem: Patient Centered Care  Goal: Patient preferences are identified and integrated in the patient's plan of care  Description: Interventions:  - What would you like us to know as we care for you? I am from home and speak [place].   - Provide timely, complete, and accurate information to patient/family  - Incorporate patient and family knowledge, values, beliefs, and cultural backgrounds into the planning and delivery of care  - Encourage patient/family to participate in care and decision-making at the level they choose  - Honor patient and family perspectives and choices  Outcome: Progressing     Problem: Patient/Family Goals  Goal: Patient/Family Long Term Goal  Description: Patient's Long Term Goal: Discharge home     Interventions:  - Monitor vitals  - Monitor labs  - IV abx  - TPN  - NPO  - Gen sx on consult  - Daily weight  - See additional Care Plan goals for specific interventions  Outcome: Progressing  Goal: Patient/Family Short Term Goal  Description: Patient's Short Term Goal: Manage pain     Interventions:   - Frequent pain assessments  - Non-pharmacological interventions  - Pain medications as needed/indicated  - See additional Care Plan goals for specific interventions  Outcome: Progressing     Problem: GASTROINTESTINAL - ADULT  Goal: Minimal or absence of nausea and vomiting  Description: INTERVENTIONS:  - Maintain adequate hydration with IV or PO as ordered and tolerated  - Nasogastric tube to low intermittent suction as ordered  - Evaluate effectiveness of ordered antiemetic medications  - Provide nonpharmacologic comfort measures as appropriate  - Advance diet as tolerated, if ordered  - Obtain nutritional consult as needed  - Evaluate fluid balance  Outcome: Progressing  Goal: Maintains or returns to baseline bowel function  Description:  INTERVENTIONS:  - Assess bowel function  - Maintain adequate hydration with IV or PO as ordered and tolerated  - Evaluate effectiveness of GI medications  - Encourage mobilization and activity  - Obtain nutritional consult as needed  - Establish a toileting routine/schedule  - Consider collaborating with pharmacy to review patient's medication profile  Outcome: Progressing     Problem: PAIN - ADULT  Goal: Verbalizes/displays adequate comfort level or patient's stated pain goal  Description: INTERVENTIONS:  - Encourage pt to monitor pain and request assistance  - Assess pain using appropriate pain scale  - Administer analgesics based on type and severity of pain and evaluate response  - Implement non-pharmacological measures as appropriate and evaluate response  - Consider cultural and social influences on pain and pain management  - Manage/alleviate anxiety  - Utilize distraction and/or relaxation techniques  - Monitor for opioid side effects  - Notify MD/LIP if interventions unsuccessful or patient reports new pain  - Anticipate increased pain with activity and pre-medicate as appropriate  Outcome: Progressing

## 2024-11-08 NOTE — PROGRESS NOTES
[facility]  part of [facility]    Progress Note    Dorys Pickens Patient Status:  Inpatient    1950 MRN [id number]   Location [facility] 4W/SW/SE Attending Rei Peralta MD   Hosp Day # 10 PCP Cristel Arias MD     Subjective:  Feels ok    Objective/Physical Exam:  General: Alert, orientated x3.  Cooperative.  No apparent distress.  Vital Signs:  Blood pressure 118/53, pulse 83, temperature 97.9 °F (36.6 °C), temperature source Oral, resp. rate 16, height 5' 1.42\" (1.56 m), weight 132 lb 9.6 oz (60.1 kg), SpO2 97%, not currently breastfeeding.  Labs:  Lab Results   Component Value Date    WBC 7.2 2024    HGB 13.3 2024    HCT 38.7 2024    .0 2024    CREATSERUM 0.51 (L) 2024    BUN 13 2024     2024    K 4.3 2024     2024    CO2 24.0 2024     (H) 2024    CA 8.9 2024    ALB 3.6 2024    ALKPHO 48 (L) 2024    BILT 0.2 2024    TP 5.7 2024    AST 16 2024    ALT 28 2024    T4F 1.5 2024    TSH 2.029 2024    LIP 29 10/29/2024    MG 2.1 2024    PHOS 3.3 2024       Imaging:  CT ABDOMEN+PELVIS(CONTRAST ONLY)(CPT=74177)    Result Date: 2024  PROCEDURE: CT ABDOMEN + PELVIS (CONTRAST ONLY) (CPT=74177)  COMPARISON: [facility], XR UGI/ESOPHAGUS SINGLE CONTRAST (CPT=74240), 2024, 11:47 AM.  [facility], XR UGI/ESOPHAGUS SINGLE CONTRAST (CPT=74240), 10/31/2024, 10:05 AM.  [facility], CT ABDOMEN + PELVIS (CONTRAST ONLY) (CPT=74177), 10/29/2024, 3:22 PM.  INDICATIONS: Duodenal diverticulum leak  TECHNIQUE: CT images of the abdomen and pelvis were obtained with non-ionic intravenous contrast material.  Automated exposure control for dose reduction was used. Adjustment of the mA and/or kV was done based on the patient's size. Use of iterative reconstruction technique for dose  reduction was used.  Dose information is transmitted to the ACR (American College of Radiology) NRDR (National Radiology Data Registry) which includes the Dose Index Registry.  FINDINGS:  LIVER: Diffuse low attenuation seen throughout the liver compatible with fatty infiltration. GALLBLADDER: The patient is post cholecystectomy. BILIARY: Mild prominence of the common bile duct and intrahepatic ducts, an expected finding post cholecystectomy. No gross intra-or extrahepatic biliary ductal dilation. SPLEEN: Unremarkable PANCREAS: The pancreas enhances symmetrically. No ductal dilation. ADRENALS: Unremarkable KIDNEYS: The kidneys enhance symmetrically. There is no hydronephrosis.  AORTA/VASCULAR:   There is atherosclerotic calcification of the abdominal aorta extending into bilateral iliac arteries. RETROPERITONEUM: No mass or enlarged adenopathy.  BOWEL:  Again visualized is a large diverticulum seen at the level of the 2nd portion of the duodenum.  This measures approximately 2.9 x 3.6 by 4.9 cm.  Previously visualized extraluminal pocket of gas and fluid has decreased in size measuring approximately 2.6 x 2.1 cm.  There previously visualized inflammation has improved but not entirely resolved.  No new collection. There is diverticulosis involving the distal descending colon and sigmoid colon without evidence of diverticulitis. The appendix is normal. There are no inflammatory changes within the right lower quadrant.  Remainder of the bowel is otherwise unremarkable without dilation or wall thickening. MESENTERY: Normal.  No mass or hernia.   PELVIS: No enlarged mass or adenopathy. No bladder wall thickening. BONES:   There is degenerative disease of the thoracic and lumbar spine. Degenerative changes are seen within the sacroiliac joints and pubic symphysis. Degenerative changes are seen in the bilateral hips. LUNG BASES: There is bibasilar and lingular atelectasis and scarring. There are coronary artery  calcifications.         CONCLUSION:   Perforated duodenal diverticulitis has improved but not resolved since 10/29/2024.  2.6 cm area of contained perforation has decreased in size but not resolved.  Inflammation within the right upper quadrant has improved but not entirely resolved.  No new collection or drainable abscess.     Dictated by (CST): Jose Guerrero MD on 11/08/2024 at 8:49 AM     Finalized by (CST): Jose Guerrero MD on 11/08/2024 at 9:03 AM          XR UGI/ESOPHAGUS SINGLE CONTRAST (CPT=74240)    Result Date: 11/6/2024  PROCEDURE: XR UGI/ESOPHAGUS SINGLE CONTRAST (CPT=74240)  COMPARISON: [facility], XR UGI/ESOPHAGUS SINGLE CONTRAST (CPT=74240), 10/31/2024, 10:05 AM.   INDICATIONS: Duodenal perforation.  Follow-up for contained extravasation of contrast at the descending duodenal sweep seen on previous study of 10/31/2024.  TECHNIQUE:   A single contrast esophagram and upper gastrointestinal series was performed with fluoroscopy in the usual manner.   FLUOROSCOPY IMAGES OBTAINED:  19 FLUOROSCOPY TIME:  2.5 minute RADIATION DOSE (Dose Area Product):  3519.8-uGy*m^2    FINDINGS:    The preliminary  film of the abdomen still demonstrates residual contrast in the ascending, transverse and descending colon which is slightly less than that seen previously .  ESOPHAGUS STRUCTURE:   Normal.  No visible obstruction, stricture or dilation.  MOTILITY:   Normal.  HERNIA:   None visible.  REFLUX:   None visible.  OTHER:   Negative.   UPPER GI STOMACH:   Normal.  No obstruction, mass or ulceration.  DUODENUM:   Persistent localized contrast extravasation at the medial aspect of the descending portion of the duodenal sweep which is about the same 2 perhaps slightly larger than that noted on the original study of 10/31/2024, and overall measures maximally about 5.1 x 3.8 cm and again suggests a contained site of perforation.  Possibility of this representing a large atypical diverticulum cannot  entirely be excluded.  Again noted is a stable diverticulum of the transverse portion the duodenal sweep measuring 3.4 x 3.3 cm. OTHER:   Negative.              CONCLUSION:  1. Normal esophagus and stomach. 2. Persistent localized contrast extravasation at the medial aspect of the descending portion of the duodenal sweep which is about the same to perhaps slightly larger than that seen on the original study of 10/31/2024 as described above.  The possibility  of this representing a large atypical diverticulum cannot entirely be excluded.  See above.    Dictated by (CST): Paul Aranda MD on 11/06/2024 at 1:09 PM     Finalized by (CST): Paul Aranda MD on 11/06/2024 at 1:16 PM          XR ABDOMEN (1 VIEW) (CPT=74018)    Result Date: 11/5/2024  PROCEDURE: XR ABDOMEN (1 VIEW) (CPT=74018)  COMPARISON: [facility], XR ABDOMEN (1 VIEW) (CPT=74018), 11/04/2024, 9:51 AM.  INDICATIONS: Duodenal Perforation.  TECHNIQUE:   Single view.   FINDINGS:  BOWEL GAS PATTERN: There is still a moderate amount of contrast throughout the ascending, transverse and descending colon which would obscure the site of extravasation of contrast from the known duodenal diverticulum.  This will be attempted tomorrow once the contrast hopefully clears from the colon.  No bowel obstruction. SOFT TISSUES: Normal.  No masses or organomegaly.  CALCIFICATIONS: None significant. BONES: Normal.  No significant arthritic changes.  OTHER: Negative.  No abnormal gaseous collections.          CONCLUSION:  1. Persistent moderate contrast in the colon obscuring the region of the duodenal diverticulum extravasation.  The examination will be attempted again tomorrow.    Dictated by (CST): Paul Aranda MD on 11/05/2024 at 1:29 PM     Finalized by (CST): Paul Aranda MD on 11/05/2024 at 1:30 PM          XR ABDOMEN (1 VIEW) (CPT=74018)    Result Date: 11/4/2024  PROCEDURE: XR ABDOMEN (1 VIEW) (CPT=74018)  COMPARISON: None.  INDICATIONS: Duodenal  diverticulum - perforation.  TECHNIQUE:   Single view.   FINDINGS:  BOWEL GAS PATTERN: There is large amount of contrast in the ascending, transverse and proximal descending colon with the transverse colon contrast obscuring the region of the duodenal perforation, and the examination will be rescheduled for tomorrow once  the colon contrast has been evacuated. SOFT TISSUES: Normal.  No masses or organomegaly.  CALCIFICATIONS: None significant. BONES: Normal.  No significant arthritic changes.  OTHER: Negative.  No abnormal gaseous collections.          CONCLUSION:  1. There is a large amount of contrast in the ascending, transverse and proximal descending colon with the transverse colon contrast obscuring the region of the duodenal perforation , and the examination will be rescheduled for tomorrow once the colon contrast has been evacuated.    Dictated by (CST): Paul Aranda MD on 11/04/2024 at 12:14 PM     Finalized by (CST): Paul Aranda MD on 11/04/2024 at 12:16 PM          XR UGI/ESOPHAGUS SINGLE CONTRAST (CPT=74240)    Result Date: 10/31/2024  PROCEDURE: XR UGI/ESOPHAGUS SINGLE CONTRAST (CPT=74240)  COMPARISON: [facility], CT ABDOMEN + PELVIS (CONTRAST ONLY) (CPT=74177), 10/29/2024, 3:22 PM.   INDICATIONS: Contained perforation of the descending duodenum on recent CT scan..  TECHNIQUE:   A single contrast esophagram and upper gastrointestinal series was performed with fluoroscopy in the usual manner.  The patient was inadvertently given a moderate amount of thin barium for this examination as opposed to water-soluble Gastrografin contrast.   FLUOROSCOPY IMAGES OBTAINED:  22 FLUOROSCOPY TIME:  2.6 minute RADIATION DOSE (Dose Area Product):  3830.1-uGy*m^2 FINDINGS:  ESOPHAGUS STRUCTURE:   Normal.  No visible obstruction, stricture or dilation.  MOTILITY:   Normal.  HERNIA:   None visible.  REFLUX:   None visible.  OTHER:   Negative.   UPPER GI STOMACH:   Poorly distended stomach because of  lack of air contrast technique.  No well-defined mass or outlet obstruction. DUODENUM:   There is apparent extraluminal extravasation of contrast at the descending portion of the duodenum where there is a 2.3 x 2.4 cm collection of contrast which extends beyond the lumen of the duodenum, it is difficult to tell if this is all free contained extravasated contrast at a site of perforation or if there may be an underlying diverticulum with associated adjacent extravasation from a duodenal diverticulum perforation.  There is mild spasm poor distention of the descending portion of  the duodenal sweep.  There are 2 moderate-sized diverticula identified in transverse portion the duodenum with one measuring 2.9 x 3.1 cm, and adjacent 1 measuring 1.2 x 1.4 cm.  There does not appear to be extravasation or perforation from the 2 adjacent diverticula of the duodenal sweep. OTHER:   Negative.              CONCLUSION:  1. There is apparent extraluminal extravasation of contrast at the descending portion of the duodenal sweep just distal to the duodenal bulb which appears to be contained and overall measures about 2.3 x 2.4 cm, and it is difficult to tell if this is all  extraluminal contained contrast or if there may be a diverticulum at this site with adjacent extravasation of contrast from a duodenal diverticular rupture.  There is no well-defined mass.  There is mild spasm and poor distention of the descending portion of the duodenal sweep.  Correlate clinically and with endoscopy.  There are two small to moderate adjacent diverticula noted of the transverse portion of the duodenal sweep without evidence of perforation or contrast extravasation. 2. Normal esophagus.  Normal appearance to the stomach which is poorly distended though.  No well-defined mass.    Dictated by (CST): Paul Aranda MD on 10/31/2024 at 1:24 PM     Finalized by (CST): Paul Aranda MD on 10/31/2024 at 1:38 PM          CT ABDOMEN+PELVIS(CONTRAST  ONLY)(CPT=74177)    Result Date: 10/29/2024  PROCEDURE: CT ABDOMEN + PELVIS (CONTRAST ONLY) (CPT=74177)  COMPARISON: None.  INDICATIONS: Epigastric pain, nausea, and vomiting.  TECHNIQUE: CT images of the abdomen and pelvis were obtained with non-ionic intravenous contrast material.  Automated exposure control for dose reduction was used. Adjustment of the mA and/or kV was done based on the patient's size. Use of iterative reconstruction technique for dose reduction was used.  Dose information is transmitted to the ACR (American College of Radiology) NRDR (National Radiology Data Registry) which includes the Dose Index Registry.  FINDINGS:  LIVER: Too small to characterize hypoattenuating hepatic dome lesion. BILIARY: No evidence for biliary dilatation. Post cholecystectomy. SPLEEN: Normal.  PANCREAS: Normal.  ADRENALS: Normal. KIDNEYS: Normal. AORTA/VASCULAR: Atherosclerotic vascular calcification including coronary artery calcification. No aneurysm or dissection.  LYMPHADENOPATHY: None. GI/MESENTERY: Multiple duodenal diverticula with a contained perforation of the 2nd portion of the duodenum with surrounding inflammatory changes including small para duodenal fluid collections measuring up to 1.6 x 3.6 cm Normal appendix.  Colonic diverticulosis.  No obstruction, bowel wall thickening, or mesenteric mass. ABDOMINAL WALL: Normal.  No mass or hernia.  URINARY BLADDER: Normal. ASCITES:   None. PELVIC ORGANS: No suspect pelvic mass. BONES: Mild-to-moderate chronic superior endplate compression fracture T8 and T10.  Minimal chronic anterior wedging of T9 and T11.  Schmorl's node associated with mild chronic superior endplate compression of L4.  No suspect bone lesion. LUNG BASES: Normal.  No visible pulmonary or pleural disease.  OTHER: Negative.          CONCLUSION:  1. Contained perforation 2nd portion of duodenum either related to perforated diverticulitis or ulcer.  Small para duodenal fluid collection measuring  approximately 3 x 3.6 x 1.6 cm. 2. Multiple duodenal diverticula. 3. Moderate coronary artery calcification.  4. Findings were called to Dr. Lara.    Dictated by (CST): Dustin Hughes MD on 10/29/2024 at 3:46 PM     Finalized by (CST): Dustin Hughes MD on 10/29/2024 at 4:00 PM          XR CHEST AP PORTABLE  (CPT=71045)    Result Date: 10/29/2024  PROCEDURE: XR CHEST AP PORTABLE  (CPT=71045) TIME: 13:18.   COMPARISON: [facility], XR CHEST AP PORTABLE (CPT=71045), 6/20/2024, 1:27 PM.  [facility], XR CHEST PA + LAT CHEST (CPT=71046), 1/08/2024, 11:33 AM.  INDICATIONS: Epigastric pain, nausea, and vomitting x4 days. Diarrhea x1 day.  TECHNIQUE:   Single view.   FINDINGS:  CARDIAC/VASC: The cardiomediastinal silhouette is unchanged in size.  There is atherosclerotic calcification of the thoracic aorta. MEDIAST/JENNIFER:   No visible mass or adenopathy. LUNGS/PLEURA: Small bibasilar opacities.  No pleural effusion.  No pneumothorax. BONES: Multilevel degenerative changes of the thoracic spine.  The thoracic compression fracture deformities were better demonstrated the prior radiograph dated 01/08/2024. OTHER: Negative.          CONCLUSION:   Small bibasilar opacities, which may reflect atelectasis with or without superimposed pneumonia.     Dictated by (CST): Lalo King MD on 10/29/2024 at 1:33 PM     Finalized by (CST): Lalo King MD on 10/29/2024 at 1:35 PM            Assessment/Plan:  Patient Active Problem List   Diagnosis    Osteoporosis    Contusion of right chest wall    Perforated diverticulum of duodenum    Duodenal ulcer with perforation (HCC)    Perforated duodenal ulcer (HCC)    Small bowel diverticular disease   CT   some improvement    Will start small amts of clears  Repeat CT probably Monday to see if continued healing      DAVID TREVIÑO MD  11/8/2024  2:46 PM

## 2024-11-08 NOTE — DIETARY NOTE
ADULT NUTRITION REASSESSMENT    Pt is at high nutrition risk.  Pt does not meet malnutrition criteria.      RECOMMENDATIONS TO MD: See Nutrition Intervention for CPN specifics .     ADMITTING DIAGNOSIS:  Duodenal ulcer with perforation (HCC) [K26.5]  Perforated diverticulum of duodenum [K57.00]  PERTINENT PAST MEDICAL HISTORY:   Past Medical History:    Disorder of thyroid    High cholesterol    Hyperlipidemia     PATIENT STATUS:   Initial 10/31/24: Pt assessed due to consult to initiate TPN. Pt admit for duodenal ulcer with perforation. PMHx sig for HLD. Visited pt today, friend in the room. Polish  ID: [id number] utilized for interview. Diet Hx: Pt reported feeling hungry. Endorsed good appetite PTA. Endorsed intakes were decreased for only 2 days PTA due to GI symptoms:pain, one episode of vomiting, and headache. Pt reported that prior to onset of symptoms she was consuming her regular eating pattern of of breakfast of eggs or cheese sandwich, lunch of fruit , and dinner of meat and rice and soups. Weight Hx: pt denied weight loss at admission MST. Pt reported UBW: 138-140#. Per weight history on EHR, pt wt has ranged from  1337-143# over the last couple of years. .9#. Negligible weight loss, not significant. NFPE  conducted, no wasting noted. Pt appears well nourished and does not appear to be at refeeding risk. Nutrition Plan: Will order CPN when PICC line is placed. RD explained nature of Parenteral nutrition support. Pt verbalized understanding.    11/1/24 Consult received to initiate and manage TPN. Chart reviewed. Pt assessed, see note from 10/31. PICC line in place. Reviewed Labs, Reviewed Meds. Ordered TPN for tonight as below. Please consult RD if earlier nutrition intervention is needed. Will follow per protocol.     11/4/24 UPDATE: Remains NPO. TPN continues. UGI planned--monitor for po start to taper/DC TPN.      11/08/24 UPDATE: TPN infusing at goal. Remains NPO. CT scan without oral  contrast this AM - \"if still with leak she will need surgical repair\" per MD note. Pt visited. Polish speaking. No complaints at this time.      FOOD/NUTRITION RELATED HISTORY:  Appetite: NPO  Intake: NPO  Intake Meeting Needs: NPO  Percent Meals Eaten (last 3 days)       Date/Time Percent Meals Eaten (%)    11/05/24 1200 0 %     Percent Meals Eaten (%): npo at 11/05/24 1200    11/05/24 1903 0 %     Percent Meals Eaten (%): npo at 11/05/24 1903    11/06/24 1230 0 %     Percent Meals Eaten (%): npo at 11/06/24 1230    11/06/24 1834 0 %     Percent Meals Eaten (%): npo at 11/06/24 1834        Food Allergies:  Mushrooms  *(common allergens to PN mixtures reviewed, none were reported)  Cultural/Ethnic/Hinduism Preferences: None    GASTROINTESTINAL: +BM 11/6 stool output x3 reported per pt and abdomen soft, passing flatus and belching, hypoactive bowel sounds; CT A/P 11/7 \"Perforated duodenal diverticulitis has improved but not resolved since 10/29/2024. 2.6 cm area of contained perforation has decreased in size but not resolved. Inflammation within the right upper quadrant has improved but not entirely resolved. No new collection or drainable abscess.\"     MEDICATIONS: reviewed; Noted non-cardiac electrolyte replacement protocol ordered; IV antibiotics in 200 ml 0.79NS and 100 ml 0.9NS daily    adult 3 in 1 TPN 83.3 mL/hr at 11/07/24 2143    dextrose 10%        levothyroxine  65 mcg Intravenous Daily    pantoprazole  40 mg Intravenous Q12H    cefTRIAXone  2 g Intravenous Q24H    metRONIDAZOLE  500 mg Intravenous Q12H    nicotine  1 patch Transdermal Daily     LABS: reviewed; no magnesium level today however has been consistently stable x1 week   Recent Labs     11/05/24  0523 11/06/24  0546 11/07/24  0459 11/08/24  0521   GLU 99  99 93  93 117* 104*   BUN 12  12 16  16 18 13   CREATSERUM 0.54*  0.54* 0.55  0.55 0.51* 0.51*   CA 9.2  9.2 9.1  9.1 8.7 8.9   MG 2.1 2.1 2.1  --      143 140  140 142 142   K  4.4  4.4 4.2  4.2 4.0 4.3     111 109  109 110 111   CO2 26.0  26.0 25.0  25.0 25.0 24.0   PHOS 3.6 3.7 3.5 3.3   OSMOCALC 296*  296* 291  291 297* 294     NUTRITION RELATED PHYSICAL FINDINGS:  - Nutrition Focused Physical Exam (NFPE): no wasting noted  - Fluid Accumulation: none  See RN documentation for details  - Skin Integrity: intact See RN documentation for details    ANTHROPOMETRICS:  HT: 156 cm (5' 1.42\")  WT: 60.1 kg (132 lb 9.6 oz) (wt down 2% (3 lbs) from admission) - wt utilized for anthropometric assessment   ADMISSION WT: 61.6 kg (136 lbs)   BMI: Body mass index is 24.72 kg/m².  BMI CLASSIFICATION: 19-24.9 kg/m2 - WNL  IBW: 105 lbs        127% IBW  Usual Body Wt: 138-140 lbs over a couple of years     95-96% UBW    WEIGHT HISTORY:  Patient Weight(s) for the past 336 hrs:   Weight   11/03/24 1700 60.1 kg (132 lb 9.6 oz)   10/29/24 1817 61.6 kg (135 lb 14.4 oz)   10/29/24 1238 63.5 kg (140 lb)     Wt Readings from Last 10 Encounters:   11/03/24 60.1 kg (132 lb 9.6 oz)   06/24/24 62.1 kg (137 lb)   06/20/24 62.6 kg (138 lb)   06/20/24 62.6 kg (138 lb)   03/19/24 63 kg (139 lb)   01/18/24 62.7 kg (138 lb 3.2 oz)   09/26/23 62.1 kg (137 lb)   10/27/22 62.1 kg (137 lb)   02/08/22 64.9 kg (143 lb)   01/07/22 64.9 kg (143 lb)     NUTRITION DIAGNOSIS/PROBLEM:   Inadequate oral intake related to Decreased ability to consume sufficient energy due to altered GI function 2/2 perforated duodenal ulcer as evidenced by report of poor po 2 days PTA, current NPO status day 3, and need for PN.    NUTRITION DIAGNOSIS PROGRESS:  Improvement (unresolved)--TPN meeting nutritional needs.      NUTRITION INTERVENTION:     NUTRITION PRESCRIPTION:   Estimated Nutrition needs: --dosing wt of 60 kg - wt taken on 11/3/24  Calories: 5821-8485 calories/day (22-25 calories per kg Dosing wt)  Protein: 72 -78 g protein/day (1.2-1.3 g protein/kg Dosing wt)  Fluid Needs: 7311-3378 ml/day (chronological age method (30 ml/kg)  or Lara Avendano)    - Diet:       Procedures    NPO      Parenteral Nutrition: RD ordered the following  CPN via right basilic double lumen PICC, 2000 ml total volume, 80 g protein, 750 kcal dextrose (221 g) & 450 kcal lipid. Total energy = 1520 kcal.   Additives and electrolytes ordered.  Meets 100% of min estimated kcal and 100% of min estimated protein needs.     - Meals and snacks: NPO  - Medical Food Supplements: RD added NPO.   - Vitamin and mineral supplements: NPO  - Feeding assistance: NPO  - Nutrition education:  Parenteral nutrition education completed   - Coordination of nutrition care: collaboration with other providers  - Discharge and transfer of nutrition care to new setting or provider: monitor plans    MONITOR AND EVALUATE/NUTRITION GOALS:  - Food and Nutrient Intake:      Monitor: for PO initiation when medically feasible  - Food and Nutrient Administration:      Monitor: TPN tolerance, adequacy of TPN, and for TPN adjustment  - Anthropometric Measurement:    Monitor weight   - Nutrition Goals:     maintain wt within 5%, return to normal GI function, TPN to meet greater than 80% nutrition needs, and support body systems    DIETITIAN FOLLOW UP: RD to follow and monitor nutrition status/manage TPN daily.      Sara Baldwin MS, RD, LDN, [facility]  [id number]

## 2024-11-09 LAB
ALBUMIN SERPL-MCNC: 3.5 G/DL (ref 3.2–4.8)
ANION GAP SERPL CALC-SCNC: 5 MMOL/L (ref 0–18)
BASOPHILS # BLD AUTO: 0.09 X10(3) UL (ref 0–0.2)
BASOPHILS NFR BLD AUTO: 1.2 %
BUN BLD-MCNC: 13 MG/DL (ref 9–23)
BUN/CREAT SERPL: 26 (ref 10–20)
CALCIUM BLD-MCNC: 8.9 MG/DL (ref 8.7–10.4)
CHLORIDE SERPL-SCNC: 110 MMOL/L (ref 98–112)
CO2 SERPL-SCNC: 27 MMOL/L (ref 21–32)
CREAT BLD-MCNC: 0.5 MG/DL
DEPRECATED RDW RBC AUTO: 42.6 FL (ref 35.1–46.3)
EGFRCR SERPLBLD CKD-EPI 2021: 99 ML/MIN/1.73M2 (ref 60–?)
EOSINOPHIL # BLD AUTO: 0.29 X10(3) UL (ref 0–0.7)
EOSINOPHIL NFR BLD AUTO: 4 %
ERYTHROCYTE [DISTWIDTH] IN BLOOD BY AUTOMATED COUNT: 13 % (ref 11–15)
GLUCOSE BLD-MCNC: 91 MG/DL (ref 70–99)
HCT VFR BLD AUTO: 33.9 %
HCT VFR BLD AUTO: 38.3 %
HGB BLD-MCNC: 11.6 G/DL
HGB BLD-MCNC: 13.7 G/DL
IMM GRANULOCYTES # BLD AUTO: 0.08 X10(3) UL (ref 0–1)
IMM GRANULOCYTES NFR BLD: 1.1 %
LYMPHOCYTES # BLD AUTO: 1.81 X10(3) UL (ref 1–4)
LYMPHOCYTES NFR BLD AUTO: 25 %
MAGNESIUM SERPL-MCNC: 2.1 MG/DL (ref 1.6–2.6)
MCH RBC QN AUTO: 30.9 PG (ref 26–34)
MCHC RBC AUTO-ENTMCNC: 34.2 G/DL (ref 31–37)
MCV RBC AUTO: 90.4 FL
MONOCYTES # BLD AUTO: 0.44 X10(3) UL (ref 0.1–1)
MONOCYTES NFR BLD AUTO: 6.1 %
NEUTROPHILS # BLD AUTO: 4.52 X10 (3) UL (ref 1.5–7.7)
NEUTROPHILS # BLD AUTO: 4.52 X10(3) UL (ref 1.5–7.7)
NEUTROPHILS NFR BLD AUTO: 62.6 %
OSMOLALITY SERPL CALC.SUM OF ELEC: 294 MOSM/KG (ref 275–295)
PHOSPHATE SERPL-MCNC: 3.6 MG/DL (ref 2.4–5.1)
PLATELET # BLD AUTO: 348 10(3)UL (ref 150–450)
POTASSIUM SERPL-SCNC: 4.2 MMOL/L (ref 3.5–5.1)
RBC # BLD AUTO: 3.75 X10(6)UL
SODIUM SERPL-SCNC: 142 MMOL/L (ref 136–145)
TRIGL SERPL-MCNC: 189 MG/DL (ref 30–149)
WBC # BLD AUTO: 7.2 X10(3) UL (ref 4–11)

## 2024-11-09 PROCEDURE — 99233 SBSQ HOSP IP/OBS HIGH 50: CPT | Performed by: HOSPITALIST

## 2024-11-09 RX ORDER — HEPARIN SODIUM 5000 [USP'U]/ML
5000 INJECTION, SOLUTION INTRAVENOUS; SUBCUTANEOUS EVERY 8 HOURS SCHEDULED
Status: DISCONTINUED | OUTPATIENT
Start: 2024-11-10 | End: 2024-11-14

## 2024-11-09 NOTE — PLAN OF CARE
No acute changes over night. Pt is alert and oriented on RA. Pt is voiding freely. Pt complained of mild pain IV tylenol has helped manage the pain. Pt is NPO except for sips of clears. TPN is running as ordered. Pt denies nausea or vomiting. Pt is up ambulating in the halls. Call light is within reach and safety measures are in place.    Problem: GASTROINTESTINAL - ADULT  Goal: Minimal or absence of nausea and vomiting  Description: INTERVENTIONS:  - Maintain adequate hydration with IV or PO as ordered and tolerated  - Nasogastric tube to low intermittent suction as ordered  - Evaluate effectiveness of ordered antiemetic medications  - Provide nonpharmacologic comfort measures as appropriate  - Advance diet as tolerated, if ordered  - Obtain nutritional consult as needed  - Evaluate fluid balance  Outcome: Progressing  Goal: Maintains or returns to baseline bowel function  Description: INTERVENTIONS:  - Assess bowel function  - Maintain adequate hydration with IV or PO as ordered and tolerated  - Evaluate effectiveness of GI medications  - Encourage mobilization and activity  - Obtain nutritional consult as needed  - Establish a toileting routine/schedule  - Consider collaborating with pharmacy to review patient's medication profile  Outcome: Progressing     Problem: PAIN - ADULT  Goal: Verbalizes/displays adequate comfort level or patient's stated pain goal  Description: INTERVENTIONS:  - Encourage pt to monitor pain and request assistance  - Assess pain using appropriate pain scale  - Administer analgesics based on type and severity of pain and evaluate response  - Implement non-pharmacological measures as appropriate and evaluate response  - Consider cultural and social influences on pain and pain management  - Manage/alleviate anxiety  - Utilize distraction and/or relaxation techniques  - Monitor for opioid side effects  - Notify MD/LIP if interventions unsuccessful or patient reports new pain  - Anticipate  increased pain with activity and pre-medicate as appropriate  Outcome: Progressing     Problem: RISK FOR INFECTION - ADULT  Goal: Absence of fever/infection during anticipated neutropenic period  Description: INTERVENTIONS  - Monitor WBC  - Administer growth factors as ordered  - Implement neutropenic guidelines  Outcome: Progressing     Problem: SAFETY ADULT - FALL  Goal: Free from fall injury  Description: INTERVENTIONS:  - Assess pt frequently for physical needs  - Identify cognitive and physical deficits and behaviors that affect risk of falls.  - [place] fall precautions as indicated by assessment.  - Educate pt/family on patient safety including physical limitations  - Instruct pt to call for assistance with activity based on assessment  - Modify environment to reduce risk of injury  - Provide assistive devices as appropriate  - Consider OT/PT consult to assist with strengthening/mobility  - Encourage toileting schedule  Outcome: Progressing     Problem: DISCHARGE PLANNING  Goal: Discharge to home or other facility with appropriate resources  Description: INTERVENTIONS:  - Identify barriers to discharge w/pt and caregiver  - Include patient/family/discharge partner in discharge planning  - Arrange for needed discharge resources and transportation as appropriate  - Identify discharge learning needs (meds, wound care, etc)  - Arrange for interpreters to assist at discharge as needed  - Consider post-discharge preferences of patient/family/discharge partner  - Complete POLST form as appropriate  - Assess patient's ability to be responsible for managing their own health  - Refer to Case Management Department for coordinating discharge planning if the patient needs post-hospital services based on physician/LIP order or complex needs related to functional status, cognitive ability or social support system  Outcome: Progressing     Problem: Altered Communication/Language Barrier  Goal: Patient/Family is able to  understand and participate in their care  Description: Interventions:  - Assess communication ability and preferred communication style  - Implement communication aides and strategies  - Use visual cues when possible  - Listen attentively, be patient, do not interrupt  - Minimize distractions  - Allow time for understanding and response  - Establish method for patient to ask for assistance (call light)  - Provide an  as needed  - Communicate barriers and strategies to overcome with those who interact with patient  Outcome: Progressing

## 2024-11-09 NOTE — PROGRESS NOTES
[facility]  Progress Note    Dorys Pickens Patient Status:  Inpatient    1950 MRN [id number]   Location [facility] 4W/SW/SE Attending Rei Peralta MD   Hosp Day # 11 PCP Cristel Arias MD     Subjective:  Pt seen sitting in chair. No acute changes overnight. Tolerating clears.     Objective/Physical Exam:  General: Alert, orientated x3.  Cooperative.  No apparent distress.  Vital Signs:  Blood pressure 130/62, pulse 82, temperature 97.7 °F (36.5 °C), temperature source Oral, resp. rate 16, height 5' 1.42\" (1.56 m), weight 132 lb 9.6 oz (60.1 kg), SpO2 96%, not currently breastfeeding.  Wt Readings from Last 3 Encounters:   24 132 lb 9.6 oz (60.1 kg)   24 137 lb (62.1 kg)   24 138 lb (62.6 kg)     Lungs: No respiratory distress.  Cardiac: Regular rate and rhythm.   Abdomen:  Soft, mild distended, non tender, with no rebound or guarding.  No peritoneal signs.   Extremities:  No lower extremity edema noted.    Incisions: Clean, dry, intact, no erythema    Intake/Output:    Intake/Output Summary (Last 24 hours) at 2024 1318  Last data filed at 2024 1100  Gross per 24 hour   Intake 50 ml   Output --   Net 50 ml     I/O last 3 completed shifts:  In: 1062.1 [IV PIGGYBACK:100]  Out: 2 [Urine:2]  I/O this shift:  In: 50 [P.O.:50]  Out: -     Medications:    levothyroxine  65 mcg Intravenous Daily    pantoprazole  40 mg Intravenous Q12H    cefTRIAXone  2 g Intravenous Q24H    metRONIDAZOLE  500 mg Intravenous Q12H    nicotine  1 patch Transdermal Daily       Labs:  Lab Results   Component Value Date    WBC 7.2 2024    HGB 11.6 2024    HCT 33.9 2024    .0 2024     Lab Results   Component Value Date     2024    K 4.2 2024     2024    CO2 27.0 2024    BUN 13 2024    CREATSERUM 0.50 2024    GLU 91 2024    CA 8.9 2024    ALB 3.5 2024     No results found for: \"PT\",  \"INR\"      Assessment  Patient Active Problem List   Diagnosis    Osteoporosis    Contusion of right chest wall    Perforated diverticulum of duodenum    Duodenal ulcer with perforation (HCC)    Perforated duodenal ulcer (HCC)    Small bowel diverticular disease       Perforated duodenal ulcer    Plan:  Continue clear liquid diet and TPN  Plan to repeat imaging on Monday, may advance diet pending read  Analgesics and antiemetics as needed  Ambulate and up to chair  DVT prophylaxis with SCDs  GI prophylaxis with protonix    Quality:  DVT Mechanical Prophylaxis:   SCDs,    DVT Pharmacologic Prophylaxis   Medication   None                Code Status: Full Code  Perez: No urinary catheter in place  Perez Duration (in days):   Central line:    BALBINA: 11/11/2024        Lalo Donald PA-C  11/9/2024  1:18 PM      The patient was independently examined. Agree with the PA's assessment and plan.       Mayela Ny MD  [facility] - General Surgery   [address] [place] [address]  p [phone]

## 2024-11-09 NOTE — PROGRESS NOTES
[facility]     Dorys Pickens Patient Status:  Inpatient    1950 MRN [id number]   Location [facility] 4W/SW/SE Attending Rei Peralta MD   Hosp Day # 11 PCP Cristel Arias MD     Subjective:     Patient resting comfortably in chair  No acute distress  No cp, sob, f,c,n,v abd pain or HA   Tolerating sips of liquids   Had a dark BM yesterday    Objective:    Review of Systems:   ROS completed; pertinent positive and negatives stated in subjective.      Vital signs:  Temp:  [97.7 °F (36.5 °C)-97.9 °F (36.6 °C)] 97.7 °F (36.5 °C)  Pulse:  [70-86] 82  Resp:  [14-16] 16  BP: (118-130)/(55-62) 130/62  SpO2:  [96 %-98 %] 96 %      Physical Exam:    Gen: NAD AO x3  Chest: good air entry CTABL  CVS: normal s1 and s2 RR  Abd: NABS soft NT ND  Neuro: CN 2-12 grossly intact  Ext: no edema in bilateral LE      Diagnostic Data:    Labs:  Recent Labs   Lab 24  0523 24  0545 24  04524  0524  0526 24  1410   WBC 6.8 7.5 7.2 7.2 7.2  --    HGB 13.5 14.0 12.7 13.3 11.6* 13.7   MCV 91.4 90.6 90.8 91.5 90.4  --    .0 372.0 324.0 307.0 348.0  --        Recent Labs   Lab 24  0523 24  0546 24  0459 24  0521 24  05   GLU 99  99 93  93 117* 104* 91   BUN 12  12 16  16 18 13 13   CREATSERUM 0.54*  0.54* 0.55  0.55 0.51* 0.51* 0.50*   CA 9.2  9.2 9.1  9.1 8.7 8.9 8.9   ALB 3.7 3.8 3.4 3.6 3.5     143 140  140 142 142 142   K 4.4  4.4 4.2  4.2 4.0 4.3 4.2     111 109  109 110 111 110   CO2 26.0  26.0 25.0  25.0 25.0 24.0 27.0   ALKPHO 48* 53* 48*  --   --    AST 55* 31 16  --   --    ALT 51* 43 28  --   --    BILT 0.2 0.2 0.2  --   --    TP 5.9 6.3 5.7  --   --        Estimated Creatinine Clearance: 77.2 mL/min (A) (based on SCr of 0.5 mg/dL (L)).    No results for input(s): \"PTP\", \"INR\" in the last 168 hours.           Imaging: Imaging data  reviewed in Epic.    Medications:    levothyroxine  65 mcg Intravenous Daily    pantoprazole  40 mg Intravenous Q12H    cefTRIAXone  2 g Intravenous Q24H    metRONIDAZOLE  500 mg Intravenous Q12H    nicotine  1 patch Transdermal Daily       Assessment & Plan:     Acute duodenal perforation  ?PUD  Imaging reviewed  Paraduodenal fluid collection and possible abscess  Contrast study showing extraluminal extravastation of the contrast  GI on consult  Underwent EGD, findings reviewed  UGI with gastrograffin also complete, still showing extravasation  Continue PPI BID and IV abx  CT scan 11/8 showing improvement in contained perforation  Tolerating sips  Gsx on consult  Continue IVF and IV abx  Continue TPN  Repeat contrast study as above  NPO at this time, TPN via PICC line started 11/01/2024  Continue IV Ceftriaxone and Flagyl  PRN pain control  Hypothyroidism  HLD  Cont home meds      Plan of care discussed with patient or family at bedside.      Supplementary Documentation:     Quality:  DVT Prophylaxis: hold given dark BM      Estimated date of discharge: TBD  Discharge is dependent on: clinical stability  At this point Ms. Pickens is expected to be discharge to: home    MDM: High

## 2024-11-10 LAB
ALBUMIN SERPL-MCNC: 3.7 G/DL (ref 3.2–4.8)
ALBUMIN/GLOB SERPL: 1.6 {RATIO} (ref 1–2)
ALP LIVER SERPL-CCNC: 50 U/L
ALT SERPL-CCNC: 20 U/L
ANION GAP SERPL CALC-SCNC: 6 MMOL/L (ref 0–18)
AST SERPL-CCNC: 16 U/L (ref ?–34)
BASOPHILS # BLD AUTO: 0.1 X10(3) UL (ref 0–0.2)
BASOPHILS NFR BLD AUTO: 1.4 %
BILIRUB SERPL-MCNC: 0.2 MG/DL (ref 0.2–1.1)
BUN BLD-MCNC: 16 MG/DL (ref 9–23)
BUN/CREAT SERPL: 30.2 (ref 10–20)
CALCIUM BLD-MCNC: 9.2 MG/DL (ref 8.7–10.4)
CHLORIDE SERPL-SCNC: 110 MMOL/L (ref 98–112)
CO2 SERPL-SCNC: 27 MMOL/L (ref 21–32)
CREAT BLD-MCNC: 0.53 MG/DL
DEPRECATED RDW RBC AUTO: 42.4 FL (ref 35.1–46.3)
EGFRCR SERPLBLD CKD-EPI 2021: 98 ML/MIN/1.73M2 (ref 60–?)
EOSINOPHIL # BLD AUTO: 0.28 X10(3) UL (ref 0–0.7)
EOSINOPHIL NFR BLD AUTO: 3.9 %
ERYTHROCYTE [DISTWIDTH] IN BLOOD BY AUTOMATED COUNT: 12.9 % (ref 11–15)
GLOBULIN PLAS-MCNC: 2.3 G/DL (ref 2–3.5)
GLUCOSE BLD-MCNC: 101 MG/DL (ref 70–99)
HCT VFR BLD AUTO: 39.3 %
HGB BLD-MCNC: 13.1 G/DL
IMM GRANULOCYTES # BLD AUTO: 0.08 X10(3) UL (ref 0–1)
IMM GRANULOCYTES NFR BLD: 1.1 %
LYMPHOCYTES # BLD AUTO: 1.6 X10(3) UL (ref 1–4)
LYMPHOCYTES NFR BLD AUTO: 22 %
MAGNESIUM SERPL-MCNC: 2.1 MG/DL (ref 1.6–2.6)
MCH RBC QN AUTO: 30.1 PG (ref 26–34)
MCHC RBC AUTO-ENTMCNC: 33.3 G/DL (ref 31–37)
MCV RBC AUTO: 90.3 FL
MONOCYTES # BLD AUTO: 0.52 X10(3) UL (ref 0.1–1)
MONOCYTES NFR BLD AUTO: 7.2 %
NEUTROPHILS # BLD AUTO: 4.68 X10 (3) UL (ref 1.5–7.7)
NEUTROPHILS # BLD AUTO: 4.68 X10(3) UL (ref 1.5–7.7)
NEUTROPHILS NFR BLD AUTO: 64.4 %
OSMOLALITY SERPL CALC.SUM OF ELEC: 297 MOSM/KG (ref 275–295)
PLATELET # BLD AUTO: 324 10(3)UL (ref 150–450)
POTASSIUM SERPL-SCNC: 4.1 MMOL/L (ref 3.5–5.1)
PROT SERPL-MCNC: 6 G/DL (ref 5.7–8.2)
RBC # BLD AUTO: 4.35 X10(6)UL
SODIUM SERPL-SCNC: 143 MMOL/L (ref 136–145)
WBC # BLD AUTO: 7.3 X10(3) UL (ref 4–11)

## 2024-11-10 PROCEDURE — 99233 SBSQ HOSP IP/OBS HIGH 50: CPT | Performed by: HOSPITALIST

## 2024-11-10 NOTE — PROGRESS NOTES
[facility]  part of [facility]ist Progress Note     Dorys Pickens Patient Status:  Inpatient    1950 MRN [id number]   Location [facility] 4W/SW/SE Attending Rei Peralta MD   Hosp Day # 12 PCP Cristel Arias MD     Subjective:     Patient resting comfortably in chair  No acute distress  No cp, sob, f,c,n,v abd pain or HA   Tolerating sips of liquids   No more dark BM    Objective:    Review of Systems:   ROS completed; pertinent positive and negatives stated in subjective.      Vital signs:  Temp:  [97.8 °F (36.6 °C)-98.4 °F (36.9 °C)] 98.4 °F (36.9 °C)  Pulse:  [] 101  Resp:  [16] 16  BP: (112-127)/() 127/107  SpO2:  [95 %-97 %] 97 %      Physical Exam:    Gen: NAD AO x3  Chest: good air entry CTABL  CVS: normal s1 and s2 RR  Abd: NABS soft NT ND  Neuro: CN 2-12 grossly intact  Ext: no edema in bilateral LE      Diagnostic Data:    Labs:  Recent Labs   Lab 24  0545 24  0459 24  0521 24  0526 24  1410 11/10/24  0449   WBC 7.5 7.2 7.2 7.2  --  7.3   HGB 14.0 12.7 13.3 11.6* 13.7 13.1   MCV 90.6 90.8 91.5 90.4  --  90.3   .0 324.0 307.0 348.0  --  324.0       Recent Labs   Lab 24  0546 24  0459 24  0521 24  0526 11/10/24  0449   GLU 93  93 117* 104* 91 101*   BUN 16  16 18 13 13 16   CREATSERUM 0.55  0.55 0.51* 0.51* 0.50* 0.53*   CA 9.1  9.1 8.7 8.9 8.9 9.2   ALB 3.8 3.4 3.6 3.5 3.7     140 142 142 142 143   K 4.2  4.2 4.0 4.3 4.2 4.1     109 110 111 110 110   CO2 25.0  25.0 25.0 24.0 27.0 27.0   ALKPHO 53* 48*  --   --  50*   AST 31 16  --   --  16   ALT 43 28  --   --  20   BILT 0.2 0.2  --   --  0.2   TP 6.3 5.7  --   --  6.0       Estimated Creatinine Clearance: 72.8 mL/min (A) (based on SCr of 0.53 mg/dL (L)).    No results for input(s): \"PTP\", \"INR\" in the last 168 hours.           Imaging: Imaging data reviewed in Epic.    Medications:    heparin  5,000 Units  Subcutaneous Q8H FAVIO    levothyroxine  65 mcg Intravenous Daily    pantoprazole  40 mg Intravenous Q12H    cefTRIAXone  2 g Intravenous Q24H    metRONIDAZOLE  500 mg Intravenous Q12H    nicotine  1 patch Transdermal Daily       Assessment & Plan:     Acute duodenal perforation  ?PUD  Imaging reviewed  Paraduodenal fluid collection and possible abscess  Contrast study showing extraluminal extravastation of the contrast  GI on consult  Underwent EGD, findings reviewed  UGI with gastrograffin also complete, still showing extravasation  Continue PPI BID and IV abx  CT scan 11/8 showing improvement in contained perforation  Tolerating sips  Will repeat CT abd tomorrow  Gsx on consult  Continue IV abx  Continue TPN  Repeat contrast study as above  TPN via PICC line started 11/01/2024  Continue IV Ceftriaxone and Flagyl  PRN pain control  Hypothyroidism  HLD  Cont home meds      Plan of care discussed with patient at bedside.      Supplementary Documentation:     Quality:  DVT Prophylaxis: hold given dark BM      Estimated date of discharge: TBD  Discharge is dependent on: clinical stability  At this point Ms. Pickens is expected to be discharge to: home    MDM: High

## 2024-11-10 NOTE — PLAN OF CARE
Patient tolerates clear  sips, ice chips. TPN continued. Ambulates on ro. No complaint of pain. Had a black stool. Hgb repeated. No acute changes.   Problem: Patient Centered Care  Goal: Patient preferences are identified and integrated in the patient's plan of care  Description: Interventions:  - What would you like us to know as we care for you? I am from home and speak [place].   - Provide timely, complete, and accurate information to patient/family  - Incorporate patient and family knowledge, values, beliefs, and cultural backgrounds into the planning and delivery of care  - Encourage patient/family to participate in care and decision-making at the level they choose  - Honor patient and family perspectives and choices  Outcome: Progressing

## 2024-11-10 NOTE — PROGRESS NOTES
[facility]  Progress Note    Dorys Pickens Patient Status:  Inpatient    1950 MRN [id number]   Location [facility] 4W/SW/SE Attending Rei Peralta MD   Hosp Day # 12 PCP Cristel Arias MD     Subjective:  Pt appears well, no acute changes overnight.    Objective/Physical Exam:  General: Alert, orientated x3.  Cooperative.  No apparent distress.  Vital Signs:  Blood pressure 112/49, pulse 70, temperature 97.8 °F (36.6 °C), temperature source Oral, resp. rate 16, height 5' 1.42\" (1.56 m), weight 132 lb 9.6 oz (60.1 kg), SpO2 95%, not currently breastfeeding.  Wt Readings from Last 3 Encounters:   24 132 lb 9.6 oz (60.1 kg)   24 137 lb (62.1 kg)   24 138 lb (62.6 kg)     Lungs: No respiratory distress.  Cardiac: Regular rate and rhythm.   Abdomen:  Soft, non distended, non tender, with no rebound or guarding.  No peritoneal signs.   Extremities:  No lower extremity edema noted.      Intake/Output:    Intake/Output Summary (Last 24 hours) at 11/10/2024 1214  Last data filed at 11/10/2024 0653  Gross per 24 hour   Intake 1099.6 ml   Output --   Net 1099.6 ml     I/O last 3 completed shifts:  In: 1149.6 [P.O.:50; IV PIGGYBACK:100]  Out: -   No intake/output data recorded.    Medications:    heparin  5,000 Units Subcutaneous Q8H FAVIO    levothyroxine  65 mcg Intravenous Daily    pantoprazole  40 mg Intravenous Q12H    cefTRIAXone  2 g Intravenous Q24H    metRONIDAZOLE  500 mg Intravenous Q12H    nicotine  1 patch Transdermal Daily       Labs:  Lab Results   Component Value Date    WBC 7.3 11/10/2024    HGB 13.1 11/10/2024    HCT 39.3 11/10/2024    .0 11/10/2024     Lab Results   Component Value Date     11/10/2024    K 4.1 11/10/2024     11/10/2024    CO2 27.0 11/10/2024    BUN 16 11/10/2024    CREATSERUM 0.53 11/10/2024     11/10/2024    CA 9.2 11/10/2024    ALKPHO 50 11/10/2024    ALT 20 11/10/2024    AST 16 11/10/2024    BILT 0.2  11/10/2024    ALB 3.7 11/10/2024    TP 6.0 11/10/2024     No results found for: \"PT\", \"INR\"      Assessment  Patient Active Problem List   Diagnosis    Osteoporosis    Contusion of right chest wall    Perforated diverticulum of duodenum    Duodenal ulcer with perforation (HCC)    Perforated duodenal ulcer (HCC)    Small bowel diverticular disease       Perforated duodenal ulcer       Plan:  Continue clear liquid diet and TPN  Plan to repeat imaging on Monday, may advance diet pending read  Analgesics and antiemetics as needed  Ambulate and up to chair  DVT prophylaxis with heparin  GI prophylaxis with protonix    Quality:  DVT Mechanical Prophylaxis:   SCDs,    DVT Pharmacologic Prophylaxis   Medication    heparin (Porcine) 5000 UNIT/ML injection 5,000 Units                Code Status: Full Code  Perez: No urinary catheter in place  Perez Duration (in days):   Central line:    BALBINA: 11/11/2024        Lalo Donald PA-C  11/10/2024  12:14 PM

## 2024-11-10 NOTE — PLAN OF CARE
Patient alert and oriented x4, on RA, ambulating independently. Patient NPO with exceptions to small sips of water, on TPN, and voiding freely. PRN ambien given per pt request. No complaints of pain overnight. Pt on IV abx rocephin and flagyl. Safety precautions maintained and call light within reach.     Problem: Patient Centered Care  Goal: Patient preferences are identified and integrated in the patient's plan of care  Description: Interventions:  - What would you like us to know as we care for you? I am from home and speak [place].   - Provide timely, complete, and accurate information to patient/family  - Incorporate patient and family knowledge, values, beliefs, and cultural backgrounds into the planning and delivery of care  - Encourage patient/family to participate in care and decision-making at the level they choose  - Honor patient and family perspectives and choices  Outcome: Progressing     Problem: Patient/Family Goals  Goal: Patient/Family Long Term Goal  Description: Patient's Long Term Goal: Discharge home     Interventions:  - Monitor vitals  - Monitor labs  - IV abx  - TPN  - NPO  - Gen sx on consult  - Daily weight  - See additional Care Plan goals for specific interventions  Outcome: Progressing  Goal: Patient/Family Short Term Goal  Description: Patient's Short Term Goal: Manage pain     Interventions:   - Frequent pain assessments  - Non-pharmacological interventions  - Pain medications as needed/indicated  - See additional Care Plan goals for specific interventions  Outcome: Progressing     Problem: GASTROINTESTINAL - ADULT  Goal: Minimal or absence of nausea and vomiting  Description: INTERVENTIONS:  - Maintain adequate hydration with IV or PO as ordered and tolerated  - Nasogastric tube to low intermittent suction as ordered  - Evaluate effectiveness of ordered antiemetic medications  - Provide nonpharmacologic comfort measures as appropriate  - Advance diet as tolerated, if ordered  - Obtain  nutritional consult as needed  - Evaluate fluid balance  Outcome: Progressing  Goal: Maintains or returns to baseline bowel function  Description: INTERVENTIONS:  - Assess bowel function  - Maintain adequate hydration with IV or PO as ordered and tolerated  - Evaluate effectiveness of GI medications  - Encourage mobilization and activity  - Obtain nutritional consult as needed  - Establish a toileting routine/schedule  - Consider collaborating with pharmacy to review patient's medication profile  Outcome: Progressing     Problem: PAIN - ADULT  Goal: Verbalizes/displays adequate comfort level or patient's stated pain goal  Description: INTERVENTIONS:  - Encourage pt to monitor pain and request assistance  - Assess pain using appropriate pain scale  - Administer analgesics based on type and severity of pain and evaluate response  - Implement non-pharmacological measures as appropriate and evaluate response  - Consider cultural and social influences on pain and pain management  - Manage/alleviate anxiety  - Utilize distraction and/or relaxation techniques  - Monitor for opioid side effects  - Notify MD/LIP if interventions unsuccessful or patient reports new pain  - Anticipate increased pain with activity and pre-medicate as appropriate  Outcome: Progressing     Problem: RISK FOR INFECTION - ADULT  Goal: Absence of fever/infection during anticipated neutropenic period  Description: INTERVENTIONS  - Monitor WBC  - Administer growth factors as ordered  - Implement neutropenic guidelines  Outcome: Progressing     Problem: SAFETY ADULT - FALL  Goal: Free from fall injury  Description: INTERVENTIONS:  - Assess pt frequently for physical needs  - Identify cognitive and physical deficits and behaviors that affect risk of falls.  - [place] fall precautions as indicated by assessment.  - Educate pt/family on patient safety including physical limitations  - Instruct pt to call for assistance with activity based on  assessment  - Modify environment to reduce risk of injury  - Provide assistive devices as appropriate  - Consider OT/PT consult to assist with strengthening/mobility  - Encourage toileting schedule  Outcome: Progressing     Problem: DISCHARGE PLANNING  Goal: Discharge to home or other facility with appropriate resources  Description: INTERVENTIONS:  - Identify barriers to discharge w/pt and caregiver  - Include patient/family/discharge partner in discharge planning  - Arrange for needed discharge resources and transportation as appropriate  - Identify discharge learning needs (meds, wound care, etc)  - Arrange for interpreters to assist at discharge as needed  - Consider post-discharge preferences of patient/family/discharge partner  - Complete POLST form as appropriate  - Assess patient's ability to be responsible for managing their own health  - Refer to Case Management Department for coordinating discharge planning if the patient needs post-hospital services based on physician/LIP order or complex needs related to functional status, cognitive ability or social support system  Outcome: Progressing     Problem: Altered Communication/Language Barrier  Goal: Patient/Family is able to understand and participate in their care  Description: Interventions:  - Assess communication ability and preferred communication style  - Implement communication aides and strategies  - Use visual cues when possible  - Listen attentively, be patient, do not interrupt  - Minimize distractions  - Allow time for understanding and response  - Establish method for patient to ask for assistance (call light)  - Provide an  as needed  - Communicate barriers and strategies to overcome with those who interact with patient  Outcome: Progressing

## 2024-11-11 ENCOUNTER — APPOINTMENT (OUTPATIENT)
Dept: CT IMAGING | Facility: HOSPITAL | Age: 74
End: 2024-11-11
Attending: SPECIALIST
Payer: MEDICAID

## 2024-11-11 LAB
ANION GAP SERPL CALC-SCNC: 6 MMOL/L (ref 0–18)
BUN BLD-MCNC: 16 MG/DL (ref 9–23)
BUN/CREAT SERPL: 30.2 (ref 10–20)
CALCIUM BLD-MCNC: 9.2 MG/DL (ref 8.7–10.4)
CHLORIDE SERPL-SCNC: 111 MMOL/L (ref 98–112)
CO2 SERPL-SCNC: 26 MMOL/L (ref 21–32)
CREAT BLD-MCNC: 0.53 MG/DL
EGFRCR SERPLBLD CKD-EPI 2021: 98 ML/MIN/1.73M2 (ref 60–?)
GLUCOSE BLD-MCNC: 98 MG/DL (ref 70–99)
MAGNESIUM SERPL-MCNC: 2.2 MG/DL (ref 1.6–2.6)
OSMOLALITY SERPL CALC.SUM OF ELEC: 297 MOSM/KG (ref 275–295)
PHOSPHATE SERPL-MCNC: 4 MG/DL (ref 2.4–5.1)
POTASSIUM SERPL-SCNC: 4.1 MMOL/L (ref 3.5–5.1)
SODIUM SERPL-SCNC: 143 MMOL/L (ref 136–145)

## 2024-11-11 PROCEDURE — 74177 CT ABD & PELVIS W/CONTRAST: CPT | Performed by: SPECIALIST

## 2024-11-11 PROCEDURE — 99233 SBSQ HOSP IP/OBS HIGH 50: CPT | Performed by: HOSPITALIST

## 2024-11-11 NOTE — CM/SW NOTE
Care Progression Note:  Length of stay: 13  GMLOS: 14  Avoidable Delays:   Code Status: FULL    Acute Medical Issue/Factors:   Acute duodenal perforation-GI and gastric sx consulted, no surgical interventions this admission, pt progressing slowly, pt remains on TPN via PICC w/sips of clears ordered PO, WBC 7.3 on 11/10, IV rocephin ordered daily, IV flagyl ordered q 12 hrs, antiemetics x 2 ordered PRN if needed.     CT abd scheduled for later today. No home Care needs anticipated at [place].     Discharge Barriers: Physical/emotional and/or cognitive functioning   Expected discharge date: TBD   Expected next site of care: Home.     Plan: Home alone pending medical clearance.    / available for discharge planning.     ZOYA Mckeon    [phone]

## 2024-11-11 NOTE — PROGRESS NOTES
[facility]  part of [facility] Hospitalist Progress Note     Dorys Pickens Patient Status:  Inpatient    1950 MRN [id number]   Location [facility] 4W/SW/SE Attending Rei Peralta MD   Hosp Day # 13 PCP Cristel Arias MD     Subjective:     Patient resting comfortably in chair  No acute distress  No cp, sob, f,c,n,v abd pain or HA   Tolerating sips of liquids     Objective:    Review of Systems:   ROS completed; pertinent positive and negatives stated in subjective.      Vital signs:  Temp:  [97.4 °F (36.3 °C)-98.4 °F (36.9 °C)] 97.8 °F (36.6 °C)  Pulse:  [] 78  Resp:  [16-20] 20  BP: (118-137)/() 120/60  SpO2:  [95 %-97 %] 95 %      Physical Exam:    Gen: NAD AO x3  Chest: good air entry CTABL  CVS: normal s1 and s2 RR  Abd: NABS soft NT ND  Neuro: CN 2-12 grossly intact  Ext: no edema in bilateral LE      Diagnostic Data:    Labs:  Recent Labs   Lab 24  0545 24  0459 24  0521 24  0524  1410 11/10/24  0449   WBC 7.5 7.2 7.2 7.2  --  7.3   HGB 14.0 12.7 13.3 11.6* 13.7 13.1   MCV 90.6 90.8 91.5 90.4  --  90.3   .0 324.0 307.0 348.0  --  324.0       Recent Labs   Lab 24  0546 24  0459 24  0521 24  0526 11/10/24  0449 24  05   GLU 93  93 117* 104* 91 101* 98   BUN 16  16 18 13 13 16 16   CREATSERUM 0.55  0.55 0.51* 0.51* 0.50* 0.53* 0.53*   CA 9.1  9.1 8.7 8.9 8.9 9.2 9.2   ALB 3.8 3.4 3.6 3.5 3.7  --      140 142 142 142 143 143   K 4.2  4.2 4.0 4.3 4.2 4.1 4.1     109 110 111 110 110 111   CO2 25.0  25.0 25.0 24.0 27.0 27.0 26.0   ALKPHO 53* 48*  --   --  50*  --    AST 31 16  --   --  16  --    ALT 43 28  --   --  20  --    BILT 0.2 0.2  --   --  0.2  --    TP 6.3 5.7  --   --  6.0  --        Estimated Creatinine Clearance: 72.8 mL/min (A) (based on SCr of 0.53 mg/dL (L)).    No results for input(s): \"PTP\", \"INR\" in the last 168 hours.           Imaging: Imaging  data reviewed in Epic.    Medications:    heparin  5,000 Units Subcutaneous Q8H FAVIO    levothyroxine  65 mcg Intravenous Daily    pantoprazole  40 mg Intravenous Q12H    cefTRIAXone  2 g Intravenous Q24H    metRONIDAZOLE  500 mg Intravenous Q12H    nicotine  1 patch Transdermal Daily       Assessment & Plan:     Acute duodenal perforation  ?PUD  Imaging reviewed  Paraduodenal fluid collection and possible abscess  Contrast study showing extraluminal extravastation of the contrast  GI on consult  Underwent EGD, findings reviewed  UGI with gastrograffin also complete, still showing extravasation  Continue PPI BID and IV abx  CT scan 11/8 showing improvement in contained perforation  Tolerating sips  Will repeat CT abd today  Gsx on consult  Continue IV abx  Continue TPN  Repeat contrast study as above  TPN via PICC line started 11/01/2024  Continue IV Ceftriaxone and Flagyl -> depending on ct today will de-escalate soon  PRN pain control  Hypothyroidism  HLD  Cont home meds      Plan of care discussed with patient at bedside.      Supplementary Documentation:     Quality:  DVT Prophylaxis: hold given dark BM      Estimated date of discharge: TBD  Discharge is dependent on: clinical stability  At this point Ms. Pickens is expected to be discharge to: home    MDM: High

## 2024-11-11 NOTE — PLAN OF CARE
Dorys is alert/oriented, polish speaking. Vitals stable. Tolerating sips of clears. TPN infusing. Rocephin and Flagyl for abx coverage. Ambulating independently, frequently in halls. Denies pain. Heparin for DVT prophylaxis. Voiding freely. Plan for follow up CT tomorrow 11/11. Patient aware of plan of care. Bed low, locked, all safety measures in place.    Problem: Patient Centered Care  Goal: Patient preferences are identified and integrated in the patient's plan of care  Description: Interventions:  - What would you like us to know as we care for you? I am from home and speak [place].   - Provide timely, complete, and accurate information to patient/family  - Incorporate patient and family knowledge, values, beliefs, and cultural backgrounds into the planning and delivery of care  - Encourage patient/family to participate in care and decision-making at the level they choose  - Honor patient and family perspectives and choices  Outcome: Progressing     Problem: Patient/Family Goals  Goal: Patient/Family Long Term Goal  Description: Patient's Long Term Goal: Discharge home     Interventions:  - Monitor vitals  - Monitor labs  - IV abx  - TPN  - NPO  - Gen sx on consult  - Daily weight  - See additional Care Plan goals for specific interventions  Outcome: Progressing  Goal: Patient/Family Short Term Goal  Description: Patient's Short Term Goal: Manage pain     Interventions:   - Frequent pain assessments  - Non-pharmacological interventions  - Pain medications as needed/indicated  - See additional Care Plan goals for specific interventions  Outcome: Progressing     Problem: GASTROINTESTINAL - ADULT  Goal: Minimal or absence of nausea and vomiting  Description: INTERVENTIONS:  - Maintain adequate hydration with IV or PO as ordered and tolerated  - Nasogastric tube to low intermittent suction as ordered  - Evaluate effectiveness of ordered antiemetic medications  - Provide nonpharmacologic comfort measures as  appropriate  - Advance diet as tolerated, if ordered  - Obtain nutritional consult as needed  - Evaluate fluid balance  Outcome: Progressing  Goal: Maintains or returns to baseline bowel function  Description: INTERVENTIONS:  - Assess bowel function  - Maintain adequate hydration with IV or PO as ordered and tolerated  - Evaluate effectiveness of GI medications  - Encourage mobilization and activity  - Obtain nutritional consult as needed  - Establish a toileting routine/schedule  - Consider collaborating with pharmacy to review patient's medication profile  Outcome: Progressing     Problem: PAIN - ADULT  Goal: Verbalizes/displays adequate comfort level or patient's stated pain goal  Description: INTERVENTIONS:  - Encourage pt to monitor pain and request assistance  - Assess pain using appropriate pain scale  - Administer analgesics based on type and severity of pain and evaluate response  - Implement non-pharmacological measures as appropriate and evaluate response  - Consider cultural and social influences on pain and pain management  - Manage/alleviate anxiety  - Utilize distraction and/or relaxation techniques  - Monitor for opioid side effects  - Notify MD/LIP if interventions unsuccessful or patient reports new pain  - Anticipate increased pain with activity and pre-medicate as appropriate  Outcome: Progressing     Problem: RISK FOR INFECTION - ADULT  Goal: Absence of fever/infection during anticipated neutropenic period  Description: INTERVENTIONS  - Monitor WBC  - Administer growth factors as ordered  - Implement neutropenic guidelines  Outcome: Progressing     Problem: SAFETY ADULT - FALL  Goal: Free from fall injury  Description: INTERVENTIONS:  - Assess pt frequently for physical needs  - Identify cognitive and physical deficits and behaviors that affect risk of falls.  - [place] fall precautions as indicated by assessment.  - Educate pt/family on patient safety including physical limitations  -  Instruct pt to call for assistance with activity based on assessment  - Modify environment to reduce risk of injury  - Provide assistive devices as appropriate  - Consider OT/PT consult to assist with strengthening/mobility  - Encourage toileting schedule  Outcome: Progressing     Problem: DISCHARGE PLANNING  Goal: Discharge to home or other facility with appropriate resources  Description: INTERVENTIONS:  - Identify barriers to discharge w/pt and caregiver  - Include patient/family/discharge partner in discharge planning  - Arrange for needed discharge resources and transportation as appropriate  - Identify discharge learning needs (meds, wound care, etc)  - Arrange for interpreters to assist at discharge as needed  - Consider post-discharge preferences of patient/family/discharge partner  - Complete POLST form as appropriate  - Assess patient's ability to be responsible for managing their own health  - Refer to Case Management Department for coordinating discharge planning if the patient needs post-hospital services based on physician/LIP order or complex needs related to functional status, cognitive ability or social support system  Outcome: Progressing     Problem: Altered Communication/Language Barrier  Goal: Patient/Family is able to understand and participate in their care  Description: Interventions:  - Assess communication ability and preferred communication style  - Implement communication aides and strategies  - Use visual cues when possible  - Listen attentively, be patient, do not interrupt  - Minimize distractions  - Allow time for understanding and response  - Establish method for patient to ask for assistance (call light)  - Provide an  as needed  - Communicate barriers and strategies to overcome with those who interact with patient  Outcome: Progressing

## 2024-11-11 NOTE — PROGRESS NOTES
[facility]  part of [facility]    Progress Note    Dorys Pickens Patient Status:  Inpatient    1950 MRN [id number]   Location [facility] 4W/SW/SE Attending Rei Peralta MD   Hosp Day # 13 PCP Cristel Arias MD     Subjective:  Feels ok  tolerated liq    Objective/Physical Exam:  General: Alert, orientated x3.  Cooperative.  No apparent distress.  Vital Signs:  Blood pressure 120/60, pulse 78, temperature 97.8 °F (36.6 °C), temperature source Oral, resp. rate 20, height 5' 1.42\" (1.56 m), weight 135 lb 6.4 oz (61.4 kg), SpO2 95%, not currently breastfeeding.    Labs:  Lab Results   Component Value Date    WBC 7.3 11/10/2024    HGB 13.1 11/10/2024    HCT 39.3 11/10/2024    .0 11/10/2024    CREATSERUM 0.53 (L) 2024    BUN 16 2024     2024    K 4.1 2024     2024    CO2 26.0 2024    GLU 98 2024    CA 9.2 2024    ALB 3.7 11/10/2024    ALKPHO 50 (L) 11/10/2024    BILT 0.2 11/10/2024    TP 6.0 11/10/2024    AST 16 11/10/2024    ALT 20 11/10/2024    T4F 1.5 2024    TSH 2.029 2024    LIP 29 10/29/2024    MG 2.2 2024    PHOS 4.0 2024       Imaging:  CT ABDOMEN+PELVIS(CONTRAST ONLY)(CPT=74177)    Result Date: 2024  PROCEDURE: CT ABDOMEN + PELVIS (CONTRAST ONLY) (CPT=74177)  COMPARISON: [facility], XR UGI/ESOPHAGUS SINGLE CONTRAST (CPT=74240), 2024, 11:47 AM.  [facility], XR UGI/ESOPHAGUS SINGLE CONTRAST (CPT=74240), 10/31/2024, 10:05 AM.  [facility], CT ABDOMEN + PELVIS (CONTRAST ONLY) (CPT=74177), 10/29/2024, 3:22 PM.  INDICATIONS: Duodenal diverticulum leak  TECHNIQUE: CT images of the abdomen and pelvis were obtained with non-ionic intravenous contrast material.  Automated exposure control for dose reduction was used. Adjustment of the mA and/or kV was done based on the patient's size. Use of iterative reconstruction technique for  dose reduction was used.  Dose information is transmitted to the ACR (American College of Radiology) NRDR (National Radiology Data Registry) which includes the Dose Index Registry.  FINDINGS:  LIVER: Diffuse low attenuation seen throughout the liver compatible with fatty infiltration. GALLBLADDER: The patient is post cholecystectomy. BILIARY: Mild prominence of the common bile duct and intrahepatic ducts, an expected finding post cholecystectomy. No gross intra-or extrahepatic biliary ductal dilation. SPLEEN: Unremarkable PANCREAS: The pancreas enhances symmetrically. No ductal dilation. ADRENALS: Unremarkable KIDNEYS: The kidneys enhance symmetrically. There is no hydronephrosis.  AORTA/VASCULAR:   There is atherosclerotic calcification of the abdominal aorta extending into bilateral iliac arteries. RETROPERITONEUM: No mass or enlarged adenopathy.  BOWEL:  Again visualized is a large diverticulum seen at the level of the 2nd portion of the duodenum.  This measures approximately 2.9 x 3.6 by 4.9 cm.  Previously visualized extraluminal pocket of gas and fluid has decreased in size measuring approximately 2.6 x 2.1 cm.  There previously visualized inflammation has improved but not entirely resolved.  No new collection. There is diverticulosis involving the distal descending colon and sigmoid colon without evidence of diverticulitis. The appendix is normal. There are no inflammatory changes within the right lower quadrant.  Remainder of the bowel is otherwise unremarkable without dilation or wall thickening. MESENTERY: Normal.  No mass or hernia.   PELVIS: No enlarged mass or adenopathy. No bladder wall thickening. BONES:   There is degenerative disease of the thoracic and lumbar spine. Degenerative changes are seen within the sacroiliac joints and pubic symphysis. Degenerative changes are seen in the bilateral hips. LUNG BASES: There is bibasilar and lingular atelectasis and scarring. There are coronary artery  calcifications.         CONCLUSION:   Perforated duodenal diverticulitis has improved but not resolved since 10/29/2024.  2.6 cm area of contained perforation has decreased in size but not resolved.  Inflammation within the right upper quadrant has improved but not entirely resolved.  No new collection or drainable abscess.     Dictated by (CST): Jose Guerrero MD on 11/08/2024 at 8:49 AM     Finalized by (CST): Jose Guerrero MD on 11/08/2024 at 9:03 AM          XR UGI/ESOPHAGUS SINGLE CONTRAST (CPT=74240)    Result Date: 11/6/2024  PROCEDURE: XR UGI/ESOPHAGUS SINGLE CONTRAST (CPT=74240)  COMPARISON: [facility], XR UGI/ESOPHAGUS SINGLE CONTRAST (CPT=74240), 10/31/2024, 10:05 AM.   INDICATIONS: Duodenal perforation.  Follow-up for contained extravasation of contrast at the descending duodenal sweep seen on previous study of 10/31/2024.  TECHNIQUE:   A single contrast esophagram and upper gastrointestinal series was performed with fluoroscopy in the usual manner.   FLUOROSCOPY IMAGES OBTAINED:  19 FLUOROSCOPY TIME:  2.5 minute RADIATION DOSE (Dose Area Product):  3519.8-uGy*m^2    FINDINGS:    The preliminary  film of the abdomen still demonstrates residual contrast in the ascending, transverse and descending colon which is slightly less than that seen previously .  ESOPHAGUS STRUCTURE:   Normal.  No visible obstruction, stricture or dilation.  MOTILITY:   Normal.  HERNIA:   None visible.  REFLUX:   None visible.  OTHER:   Negative.   UPPER GI STOMACH:   Normal.  No obstruction, mass or ulceration.  DUODENUM:   Persistent localized contrast extravasation at the medial aspect of the descending portion of the duodenal sweep which is about the same 2 perhaps slightly larger than that noted on the original study of 10/31/2024, and overall measures maximally about 5.1 x 3.8 cm and again suggests a contained site of perforation.  Possibility of this representing a large atypical diverticulum cannot  entirely be excluded.  Again noted is a stable diverticulum of the transverse portion the duodenal sweep measuring 3.4 x 3.3 cm. OTHER:   Negative.              CONCLUSION:  1. Normal esophagus and stomach. 2. Persistent localized contrast extravasation at the medial aspect of the descending portion of the duodenal sweep which is about the same to perhaps slightly larger than that seen on the original study of 10/31/2024 as described above.  The possibility  of this representing a large atypical diverticulum cannot entirely be excluded.  See above.    Dictated by (CST): Paul Aranda MD on 11/06/2024 at 1:09 PM     Finalized by (CST): Paul Aranda MD on 11/06/2024 at 1:16 PM          XR ABDOMEN (1 VIEW) (CPT=74018)    Result Date: 11/5/2024  PROCEDURE: XR ABDOMEN (1 VIEW) (CPT=74018)  COMPARISON: [facility], XR ABDOMEN (1 VIEW) (CPT=74018), 11/04/2024, 9:51 AM.  INDICATIONS: Duodenal Perforation.  TECHNIQUE:   Single view.   FINDINGS:  BOWEL GAS PATTERN: There is still a moderate amount of contrast throughout the ascending, transverse and descending colon which would obscure the site of extravasation of contrast from the known duodenal diverticulum.  This will be attempted tomorrow once the contrast hopefully clears from the colon.  No bowel obstruction. SOFT TISSUES: Normal.  No masses or organomegaly.  CALCIFICATIONS: None significant. BONES: Normal.  No significant arthritic changes.  OTHER: Negative.  No abnormal gaseous collections.          CONCLUSION:  1. Persistent moderate contrast in the colon obscuring the region of the duodenal diverticulum extravasation.  The examination will be attempted again tomorrow.    Dictated by (CST): Paul Aranda MD on 11/05/2024 at 1:29 PM     Finalized by (CST): Paul Aranda MD on 11/05/2024 at 1:30 PM          XR ABDOMEN (1 VIEW) (CPT=74018)    Result Date: 11/4/2024  PROCEDURE: XR ABDOMEN (1 VIEW) (CPT=74018)  COMPARISON: None.  INDICATIONS: Duodenal  diverticulum - perforation.  TECHNIQUE:   Single view.   FINDINGS:  BOWEL GAS PATTERN: There is large amount of contrast in the ascending, transverse and proximal descending colon with the transverse colon contrast obscuring the region of the duodenal perforation, and the examination will be rescheduled for tomorrow once  the colon contrast has been evacuated. SOFT TISSUES: Normal.  No masses or organomegaly.  CALCIFICATIONS: None significant. BONES: Normal.  No significant arthritic changes.  OTHER: Negative.  No abnormal gaseous collections.          CONCLUSION:  1. There is a large amount of contrast in the ascending, transverse and proximal descending colon with the transverse colon contrast obscuring the region of the duodenal perforation , and the examination will be rescheduled for tomorrow once the colon contrast has been evacuated.    Dictated by (CST): Paul Aranda MD on 11/04/2024 at 12:14 PM     Finalized by (CST): Paul Aranda MD on 11/04/2024 at 12:16 PM          XR UGI/ESOPHAGUS SINGLE CONTRAST (CPT=74240)    Result Date: 10/31/2024  PROCEDURE: XR UGI/ESOPHAGUS SINGLE CONTRAST (CPT=74240)  COMPARISON: [facility], CT ABDOMEN + PELVIS (CONTRAST ONLY) (CPT=74177), 10/29/2024, 3:22 PM.   INDICATIONS: Contained perforation of the descending duodenum on recent CT scan..  TECHNIQUE:   A single contrast esophagram and upper gastrointestinal series was performed with fluoroscopy in the usual manner.  The patient was inadvertently given a moderate amount of thin barium for this examination as opposed to water-soluble Gastrografin contrast.   FLUOROSCOPY IMAGES OBTAINED:  22 FLUOROSCOPY TIME:  2.6 minute RADIATION DOSE (Dose Area Product):  3830.1-uGy*m^2 FINDINGS:  ESOPHAGUS STRUCTURE:   Normal.  No visible obstruction, stricture or dilation.  MOTILITY:   Normal.  HERNIA:   None visible.  REFLUX:   None visible.  OTHER:   Negative.   UPPER GI STOMACH:   Poorly distended stomach because of  lack of air contrast technique.  No well-defined mass or outlet obstruction. DUODENUM:   There is apparent extraluminal extravasation of contrast at the descending portion of the duodenum where there is a 2.3 x 2.4 cm collection of contrast which extends beyond the lumen of the duodenum, it is difficult to tell if this is all free contained extravasated contrast at a site of perforation or if there may be an underlying diverticulum with associated adjacent extravasation from a duodenal diverticulum perforation.  There is mild spasm poor distention of the descending portion of  the duodenal sweep.  There are 2 moderate-sized diverticula identified in transverse portion the duodenum with one measuring 2.9 x 3.1 cm, and adjacent 1 measuring 1.2 x 1.4 cm.  There does not appear to be extravasation or perforation from the 2 adjacent diverticula of the duodenal sweep. OTHER:   Negative.              CONCLUSION:  1. There is apparent extraluminal extravasation of contrast at the descending portion of the duodenal sweep just distal to the duodenal bulb which appears to be contained and overall measures about 2.3 x 2.4 cm, and it is difficult to tell if this is all  extraluminal contained contrast or if there may be a diverticulum at this site with adjacent extravasation of contrast from a duodenal diverticular rupture.  There is no well-defined mass.  There is mild spasm and poor distention of the descending portion of the duodenal sweep.  Correlate clinically and with endoscopy.  There are two small to moderate adjacent diverticula noted of the transverse portion of the duodenal sweep without evidence of perforation or contrast extravasation. 2. Normal esophagus.  Normal appearance to the stomach which is poorly distended though.  No well-defined mass.    Dictated by (CST): Paul Aranda MD on 10/31/2024 at 1:24 PM     Finalized by (CST): Paul Aranda MD on 10/31/2024 at 1:38 PM          CT ABDOMEN+PELVIS(CONTRAST  ONLY)(CPT=74177)    Result Date: 10/29/2024  PROCEDURE: CT ABDOMEN + PELVIS (CONTRAST ONLY) (CPT=74177)  COMPARISON: None.  INDICATIONS: Epigastric pain, nausea, and vomiting.  TECHNIQUE: CT images of the abdomen and pelvis were obtained with non-ionic intravenous contrast material.  Automated exposure control for dose reduction was used. Adjustment of the mA and/or kV was done based on the patient's size. Use of iterative reconstruction technique for dose reduction was used.  Dose information is transmitted to the ACR (American College of Radiology) NRDR (National Radiology Data Registry) which includes the Dose Index Registry.  FINDINGS:  LIVER: Too small to characterize hypoattenuating hepatic dome lesion. BILIARY: No evidence for biliary dilatation. Post cholecystectomy. SPLEEN: Normal.  PANCREAS: Normal.  ADRENALS: Normal. KIDNEYS: Normal. AORTA/VASCULAR: Atherosclerotic vascular calcification including coronary artery calcification. No aneurysm or dissection.  LYMPHADENOPATHY: None. GI/MESENTERY: Multiple duodenal diverticula with a contained perforation of the 2nd portion of the duodenum with surrounding inflammatory changes including small para duodenal fluid collections measuring up to 1.6 x 3.6 cm Normal appendix.  Colonic diverticulosis.  No obstruction, bowel wall thickening, or mesenteric mass. ABDOMINAL WALL: Normal.  No mass or hernia.  URINARY BLADDER: Normal. ASCITES:   None. PELVIC ORGANS: No suspect pelvic mass. BONES: Mild-to-moderate chronic superior endplate compression fracture T8 and T10.  Minimal chronic anterior wedging of T9 and T11.  Schmorl's node associated with mild chronic superior endplate compression of L4.  No suspect bone lesion. LUNG BASES: Normal.  No visible pulmonary or pleural disease.  OTHER: Negative.          CONCLUSION:  1. Contained perforation 2nd portion of duodenum either related to perforated diverticulitis or ulcer.  Small para duodenal fluid collection measuring  approximately 3 x 3.6 x 1.6 cm. 2. Multiple duodenal diverticula. 3. Moderate coronary artery calcification.  4. Findings were called to Dr. Lara.    Dictated by (CST): Dustin Hughes MD on 10/29/2024 at 3:46 PM     Finalized by (CST): Dustin Hughes MD on 10/29/2024 at 4:00 PM          XR CHEST AP PORTABLE  (CPT=71045)    Result Date: 10/29/2024  PROCEDURE: XR CHEST AP PORTABLE  (CPT=71045) TIME: 13:18.   COMPARISON: [facility], XR CHEST AP PORTABLE (CPT=71045), 6/20/2024, 1:27 PM.  [facility], XR CHEST PA + LAT CHEST (CPT=71046), 1/08/2024, 11:33 AM.  INDICATIONS: Epigastric pain, nausea, and vomitting x4 days. Diarrhea x1 day.  TECHNIQUE:   Single view.   FINDINGS:  CARDIAC/VASC: The cardiomediastinal silhouette is unchanged in size.  There is atherosclerotic calcification of the thoracic aorta. MEDIAST/JENNIFER:   No visible mass or adenopathy. LUNGS/PLEURA: Small bibasilar opacities.  No pleural effusion.  No pneumothorax. BONES: Multilevel degenerative changes of the thoracic spine.  The thoracic compression fracture deformities were better demonstrated the prior radiograph dated 01/08/2024. OTHER: Negative.          CONCLUSION:   Small bibasilar opacities, which may reflect atelectasis with or without superimposed pneumonia.     Dictated by (CST): Lalo King MD on 10/29/2024 at 1:33 PM     Finalized by (CST): Lalo King MD on 10/29/2024 at 1:35 PM            Assessment/Plan:  Patient Active Problem List   Diagnosis    Osteoporosis    Contusion of right chest wall    Perforated diverticulum of duodenum    Duodenal ulcer with perforation (HCC)    Perforated duodenal ulcer (HCC)    Small bowel diverticular disease   Repeat CT today  if healing increase po  and consider discharge planning if tolerates    DAVID TREVIÑO MD  11/11/2024  12:05 PM

## 2024-11-11 NOTE — PLAN OF CARE
Patient is alert and oriented. Room air. Vital signs stable. TPN and IV abx continued via right PICC line. NPO. Denies pain/nausea. Ambulates independently. Call light and personal belongings within reach. Safety precautions in place.     Problem: Patient Centered Care  Goal: Patient preferences are identified and integrated in the patient's plan of care  Description: Interventions:  - What would you like us to know as we care for you? I am from home and speak [place].   - Provide timely, complete, and accurate information to patient/family  - Incorporate patient and family knowledge, values, beliefs, and cultural backgrounds into the planning and delivery of care  - Encourage patient/family to participate in care and decision-making at the level they choose  - Honor patient and family perspectives and choices  Outcome: Progressing     Problem: Patient/Family Goals  Goal: Patient/Family Long Term Goal  Description: Patient's Long Term Goal: Discharge home     Interventions:  - Monitor vitals  - Monitor labs  - IV abx  - TPN  - NPO  - Gen sx on consult  - Daily weight  - See additional Care Plan goals for specific interventions  Outcome: Progressing  Goal: Patient/Family Short Term Goal  Description: Patient's Short Term Goal: Manage pain     Interventions:   - Frequent pain assessments  - Non-pharmacological interventions  - Pain medications as needed/indicated  - See additional Care Plan goals for specific interventions  Outcome: Progressing     Problem: GASTROINTESTINAL - ADULT  Goal: Minimal or absence of nausea and vomiting  Description: INTERVENTIONS:  - Maintain adequate hydration with IV or PO as ordered and tolerated  - Nasogastric tube to low intermittent suction as ordered  - Evaluate effectiveness of ordered antiemetic medications  - Provide nonpharmacologic comfort measures as appropriate  - Advance diet as tolerated, if ordered  - Obtain nutritional consult as needed  - Evaluate fluid balance  Outcome:  Progressing  Goal: Maintains or returns to baseline bowel function  Description: INTERVENTIONS:  - Assess bowel function  - Maintain adequate hydration with IV or PO as ordered and tolerated  - Evaluate effectiveness of GI medications  - Encourage mobilization and activity  - Obtain nutritional consult as needed  - Establish a toileting routine/schedule  - Consider collaborating with pharmacy to review patient's medication profile  Outcome: Progressing     Problem: PAIN - ADULT  Goal: Verbalizes/displays adequate comfort level or patient's stated pain goal  Description: INTERVENTIONS:  - Encourage pt to monitor pain and request assistance  - Assess pain using appropriate pain scale  - Administer analgesics based on type and severity of pain and evaluate response  - Implement non-pharmacological measures as appropriate and evaluate response  - Consider cultural and social influences on pain and pain management  - Manage/alleviate anxiety  - Utilize distraction and/or relaxation techniques  - Monitor for opioid side effects  - Notify MD/LIP if interventions unsuccessful or patient reports new pain  - Anticipate increased pain with activity and pre-medicate as appropriate  Outcome: Progressing     Problem: RISK FOR INFECTION - ADULT  Goal: Absence of fever/infection during anticipated neutropenic period  Description: INTERVENTIONS  - Monitor WBC  - Administer growth factors as ordered  - Implement neutropenic guidelines  Outcome: Progressing     Problem: SAFETY ADULT - FALL  Goal: Free from fall injury  Description: INTERVENTIONS:  - Assess pt frequently for physical needs  - Identify cognitive and physical deficits and behaviors that affect risk of falls.  - [place] fall precautions as indicated by assessment.  - Educate pt/family on patient safety including physical limitations  - Instruct pt to call for assistance with activity based on assessment  - Modify environment to reduce risk of injury  - Provide assistive  devices as appropriate  - Consider OT/PT consult to assist with strengthening/mobility  - Encourage toileting schedule  Outcome: Progressing     Problem: DISCHARGE PLANNING  Goal: Discharge to home or other facility with appropriate resources  Description: INTERVENTIONS:  - Identify barriers to discharge w/pt and caregiver  - Include patient/family/discharge partner in discharge planning  - Arrange for needed discharge resources and transportation as appropriate  - Identify discharge learning needs (meds, wound care, etc)  - Arrange for interpreters to assist at discharge as needed  - Consider post-discharge preferences of patient/family/discharge partner  - Complete POLST form as appropriate  - Assess patient's ability to be responsible for managing their own health  - Refer to Case Management Department for coordinating discharge planning if the patient needs post-hospital services based on physician/LIP order or complex needs related to functional status, cognitive ability or social support system  Outcome: Progressing     Problem: Altered Communication/Language Barrier  Goal: Patient/Family is able to understand and participate in their care  Description: Interventions:  - Assess communication ability and preferred communication style  - Implement communication aides and strategies  - Use visual cues when possible  - Listen attentively, be patient, do not interrupt  - Minimize distractions  - Allow time for understanding and response  - Establish method for patient to ask for assistance (call light)  - Provide an  as needed  - Communicate barriers and strategies to overcome with those who interact with patient  Outcome: Progressing

## 2024-11-12 LAB
ALBUMIN SERPL-MCNC: 3.4 G/DL (ref 3.2–4.8)
ANION GAP SERPL CALC-SCNC: 5 MMOL/L (ref 0–18)
BASOPHILS # BLD AUTO: 0.11 X10(3) UL (ref 0–0.2)
BASOPHILS NFR BLD AUTO: 1.9 %
BUN BLD-MCNC: 13 MG/DL (ref 9–23)
BUN/CREAT SERPL: 26.5 (ref 10–20)
CALCIUM BLD-MCNC: 8.8 MG/DL (ref 8.7–10.4)
CHLORIDE SERPL-SCNC: 112 MMOL/L (ref 98–112)
CO2 SERPL-SCNC: 25 MMOL/L (ref 21–32)
CREAT BLD-MCNC: 0.49 MG/DL
DEPRECATED RDW RBC AUTO: 43.1 FL (ref 35.1–46.3)
EGFRCR SERPLBLD CKD-EPI 2021: 99 ML/MIN/1.73M2 (ref 60–?)
EOSINOPHIL # BLD AUTO: 0.28 X10(3) UL (ref 0–0.7)
EOSINOPHIL NFR BLD AUTO: 4.9 %
ERYTHROCYTE [DISTWIDTH] IN BLOOD BY AUTOMATED COUNT: 13.2 % (ref 11–15)
GLUCOSE BLD-MCNC: 102 MG/DL (ref 70–99)
HCT VFR BLD AUTO: 37.6 %
HGB BLD-MCNC: 12.7 G/DL
IMM GRANULOCYTES # BLD AUTO: 0.07 X10(3) UL (ref 0–1)
IMM GRANULOCYTES NFR BLD: 1.2 %
LYMPHOCYTES # BLD AUTO: 1.45 X10(3) UL (ref 1–4)
LYMPHOCYTES NFR BLD AUTO: 25.3 %
MAGNESIUM SERPL-MCNC: 2.1 MG/DL (ref 1.6–2.6)
MCH RBC QN AUTO: 30.4 PG (ref 26–34)
MCHC RBC AUTO-ENTMCNC: 33.8 G/DL (ref 31–37)
MCV RBC AUTO: 90 FL
MONOCYTES # BLD AUTO: 0.47 X10(3) UL (ref 0.1–1)
MONOCYTES NFR BLD AUTO: 8.2 %
NEUTROPHILS # BLD AUTO: 3.36 X10 (3) UL (ref 1.5–7.7)
NEUTROPHILS # BLD AUTO: 3.36 X10(3) UL (ref 1.5–7.7)
NEUTROPHILS NFR BLD AUTO: 58.5 %
OSMOLALITY SERPL CALC.SUM OF ELEC: 294 MOSM/KG (ref 275–295)
PHOSPHATE SERPL-MCNC: 3.9 MG/DL (ref 2.4–5.1)
PLATELET # BLD AUTO: 283 10(3)UL (ref 150–450)
POTASSIUM SERPL-SCNC: 4.1 MMOL/L (ref 3.5–5.1)
RBC # BLD AUTO: 4.18 X10(6)UL
SODIUM SERPL-SCNC: 142 MMOL/L (ref 136–145)
WBC # BLD AUTO: 5.7 X10(3) UL (ref 4–11)

## 2024-11-12 PROCEDURE — 99233 SBSQ HOSP IP/OBS HIGH 50: CPT | Performed by: HOSPITALIST

## 2024-11-12 NOTE — CONSULTS
[facility]  part of [facility] ID CONSULT NOTE    Dorys Pickens Patient Status:  Inpatient    1950 MRN [id number]   Location [facility] 4W/SW/SE Attending Rei Peralta MD   Hosp Day # 14 PCP Cristel Arias MD       Reason for Consultation:  Diverticulitis    ASSESSMENT:    Antibiotics: IV ceftriaxone, flagyl    # Duodenal diverticulitis with perforation and extraluminal fluid with fistula - abscess vs uninfected contents   - most recent CT A/P  improved w/o further fistula   - s/p EGD  with duodena diverticulosis with perforation      PLAN:    -  discontinue IV ceftriaxone and flagyl and monitor closely off abx for now  -  diet per GS  -  Follow fever curve, wbc  -  Reviewed labs, micro, imaging reports, available old records  -  d/w patient, RN, Dr. Peralta, Dr. Alvarez    History of Present Illness:  Dorys Pickens is a a(n) 73 year old female. Patient is a 73-year-old female with a history of COPD, HTN who came to the hospital on 10/29 with acute onset of 1 day epigastric pain with 1 episode of emesis but no fevers.  No antibiotics prior to admission.  Was afebrile with initial WBC of 14.  CT A/P showed a contained perforation of the second portion of the duodenum concern for diverticulitis with a fluid collection as well as duodenal diverticula.  Started on ceftriaxone and Flagyl.  Seen by GI and underwent EGD on  with findings of duodenal diverticulosis with perforation.  Path negative for malignancy or H. pylori.  Started on TPN via PICC line.  Upper GI study  with extravasation of contrast noted in the duodenum.  Most recent CT done  with decreased size of the extraluminal fluid and no further fistula noted.    History:  Past Medical History:    Disorder of thyroid    High cholesterol    Hyperlipidemia     Past Surgical History:   Procedure Laterality Date    Cholecystectomy      Egd N/A 2024    Dr. Peguero     History reviewed.  No pertinent family history.   reports that she has been smoking cigarettes. She started smoking about 57 years ago. She has a 28.7 pack-year smoking history. She has never used smokeless tobacco. She reports that she does not drink alcohol and does not use drugs.    Allergies:  Allergies[1]    Medications:    Current Facility-Administered Medications:     adult 3 in 1 TPN, , Intravenous, Continuous TPN    adult 3 in 1 TPN, , Intravenous, Continuous TPN    heparin (Porcine) 5000 UNIT/ML injection 5,000 Units, 5,000 Units, Subcutaneous, Q8H FAVIO    acetaminophen (Ofirmev) 10 mg/mL infusion premix 1,000 mg, 1,000 mg, Intravenous, Q6H PRN    phenol (Chloraseptic) 1.4 % oral liquid spray, , Oral, Q2H PRN    levothyroxine (Synthroid) 100 MCG/5ML injection 65 mcg, 65 mcg, Intravenous, Daily    dextrose 10% infusion (TPN no rate), , Intravenous, Continuous PRN    zolpidem (Ambien) tab 5 mg, 5 mg, Oral, Nightly PRN    pantoprazole (Protonix) 40 mg in sodium chloride 0.9% PF 10 mL IV push, 40 mg, Intravenous, Q12H    morphINE PF 2 MG/ML injection 1 mg, 1 mg, Intravenous, Q2H PRN **OR** morphINE PF 2 MG/ML injection 2 mg, 2 mg, Intravenous, Q2H PRN **OR** morphINE PF 4 MG/ML injection 4 mg, 4 mg, Intravenous, Q2H PRN    ondansetron (Zofran) 4 MG/2ML injection 4 mg, 4 mg, Intravenous, Q6H PRN    cefTRIAXone (Rocephin) 2 g in sodium chloride 0.9% 100 mL IVPB-ADDV, 2 g, Intravenous, Q24H    metRONIDAZOLE in sodium chloride 0.79% (Flagyl) 5 mg/mL IVPB premix 500 mg, 500 mg, Intravenous, Q12H    albuterol (Ventolin HFA) 108 (90 Base) MCG/ACT inhaler 2 puff, 2 puff, Inhalation, Q4H PRN    nicotine (Nicoderm CQ) 21 MG/24HR patch 1 patch, 1 patch, Transdermal, Daily    Review of Systems:  Per HPI    Physical Exam:  Vital signs: Blood pressure 124/59, pulse 88, temperature 98.7 °F (37.1 °C), temperature source Oral, resp. rate 20, height 156 cm (5' 1.42\"), weight 135 lb 6.4 oz (61.4 kg), SpO2 97%, not currently  breastfeeding.    General: Alert, oriented, NAD  HEENT: Moist mucous membranes. EOMI  Neck: No lymphadenopathy.  Supple.  Cardiovascular: No chest wall tenderness  Respiratory: Symmetric expansion  Abdomen: Soft, nontender, nondistended.   Musculoskeletal: No edema noted  Integument: No lesions. No erythema.    Laboratory Data: Reviewed in EMR    Microbiology: Reviewed in EMR    Radiology: Reviewed    Thank you for allowing us to participate in the care of this patient. Please do not hesitate to call if you have any questions.     We will continue to follow with you and will make further recommendations based on his progress.    Otto Parikh MD   [facility]  [phone]  11/12/2024         [1]   Allergies  Allergen Reactions    Mushrooms ANAPHYLAXIS    Pcn [Penicillins] SHORTNESS OF BREATH     Pt tolerated ceftriaxone in the ED on 10/29/24    Amoxicillin     Sulfa Antibiotics

## 2024-11-12 NOTE — DIETARY NOTE
ADULT NUTRITION REASSESSMENT    Pt is at high nutrition risk.  Pt does not meet malnutrition criteria.      RECOMMENDATIONS TO MD: See Nutrition Intervention for weaned CPN and oral nutritional supplement (ONS) specifics      ADMITTING DIAGNOSIS:  Duodenal ulcer with perforation (HCC) [K26.5]  Perforated diverticulum of duodenum [K57.00]  PERTINENT PAST MEDICAL HISTORY:   Past Medical History:    Disorder of thyroid    High cholesterol    Hyperlipidemia     PATIENT STATUS:   Initial 10/31/24: Pt assessed due to consult to initiate TPN. Pt admit for duodenal ulcer with perforation. PMHx sig for HLD. Visited pt today, friend in the room. Polish  ID: [id number] utilized for interview. Diet Hx: Pt reported feeling hungry. Endorsed good appetite PTA. Endorsed intakes were decreased for only 2 days PTA due to GI symptoms:pain, one episode of vomiting, and headache. Pt reported that prior to onset of symptoms she was consuming her regular eating pattern of of breakfast of eggs or cheese sandwich, lunch of fruit , and dinner of meat and rice and soups. Weight Hx: pt denied weight loss at admission MST. Pt reported UBW: 138-140#. Per weight history on EHR, pt wt has ranged from  1337-143# over the last couple of years. .9#. Negligible weight loss, not significant. NFPE  conducted, no wasting noted. Pt appears well nourished and does not appear to be at refeeding risk. Nutrition Plan: Will order CPN when PICC line is placed. RD explained nature of Parenteral nutrition support. Pt verbalized understanding.  11/1/24 Consult received to initiate and manage TPN. Chart reviewed. Pt assessed, see note from 10/31. PICC line in place. Reviewed Labs, Reviewed Meds. Ordered TPN for tonight as below. Please consult RD if earlier nutrition intervention is needed. Will follow per protocol.   11/4/24 UPDATE: Remains NPO. TPN continues. UGI planned--monitor for po start to taper/DC TPN.    11/08/24 UPDATE: TPN infusing at goal.  Remains NPO. CT scan without oral contrast this AM - \"if still with leak she will need surgical repair\" per MD note. Pt visited. Polish speaking. No complaints at this time.      Update 11/12/24: RA completed per protocol. Chart reviewed, CT 11/8 showing improvement in contained perforation. Repeat CT yesterday (11/11). Full liquid diet (FLD) initiated 11/11 PM. Discussion with RN, pt tolerating full liquid diet. Diet advanced to low fiber/soft after breakfast this morning. Intakes reviewed, 0-100% x 4 meals since last visit. Discussion with MD, wean CPN with plans to discontinue tomorrow. Pt visited, RN assisted with translating (Polish). Pt agreeable to Magic Cup Daily to help maximize nutrition. +ONS per discussion. Encouraged adequate energy and protein intake. Provided low fiber handout per RN request.   Addendum 1300: Discussion with general surgery - modified low fiber diet per MD request. Surgeon updated diet order with restrictions.     FOOD/NUTRITION RELATED HISTORY:  Appetite: Improved  Intake: ~0-100% x4 meals documented since last visit  Intake Meeting Needs: Marginal, added oral nutrition supplements (ONS) and Wean CPN   Percent Meals Eaten (last 6 days)       Date/Time Percent Meals Eaten (%)    11/06/24 1230 0 %     Percent Meals Eaten (%): npo at 11/06/24 1230    11/06/24 1834 0 %     Percent Meals Eaten (%): npo at 11/06/24 1834    11/09/24 1100 100 %    11/10/24 1700 0 %    11/11/24 1930 25 %    11/12/24 1054 100 %          Food Allergies:  Mushrooms causes anaphylaxis  *(common allergens to PN mixtures reviewed, none were reported)  Cultural/Ethnic/Jew Preferences: None    GASTROINTESTINAL: +BM 11/11/24 - small;loose;green    MEDICATIONS: reviewed; Noted non-cardiac electrolyte replacement protocol ordered  Weaning CPN tonight with plans to discontinue tomorrow   adult 3 in 1 TPN      adult 3 in 1 TPN 83.3 mL/hr at 11/11/24 2100    dextrose 10%        heparin  5,000 Units Subcutaneous Q8H  FAVIO    levothyroxine  65 mcg Intravenous Daily    pantoprazole  40 mg Intravenous Q12H    cefTRIAXone  2 g Intravenous Q24H    metRONIDAZOLE  500 mg Intravenous Q12H    nicotine  1 patch Transdermal Daily     LABS: reviewed WNL  Recent Labs     11/09/24  0526 11/10/24  0449 11/11/24  0521 11/12/24  0544   GLU 91 101* 98 102*   BUN 13 16 16 13   CREATSERUM 0.50* 0.53* 0.53* 0.49*   CA 8.9 9.2 9.2 8.8   MG 2.1 2.1 2.2 2.1    143 143 142   K 4.2 4.1 4.1 4.1    110 111 112   CO2 27.0 27.0 26.0 25.0   PHOS 3.6  --  4.0 3.9   OSMOCALC 294 297* 297* 294     NUTRITION RELATED PHYSICAL FINDINGS:  - Nutrition Focused Physical Exam (NFPE): no wasting noted  - Fluid Accumulation: none  See RN documentation for details  - Skin Integrity: intact See RN documentation for details    ANTHROPOMETRICS:  HT: 156 cm (5' 1.42\")  WT: 61.4 kg (135 lb 6.4 oz)  - wt stable to admit wt  Last Visit Wt: 132# 10 oz on 11/3/24   ADMISSION WT: 61.6 kg (135 lbs 14.4 oz)   BMI: Body mass index is 25.24 kg/m².  BMI CLASSIFICATION: 25-29.9 kg/m2 - overweight  IBW: 105 lbs        129% IBW  Usual Body Wt: 138-140 lbs over a couple of years     98% UBW    WEIGHT HISTORY:  Patient Weight(s) for the past 336 hrs:   Weight   11/11/24 0836 61.4 kg (135 lb 6.4 oz)   11/03/24 1700 60.1 kg (132 lb 9.6 oz)   10/29/24 1817 61.6 kg (135 lb 14.4 oz)     Wt Readings from Last 10 Encounters:   11/11/24 61.4 kg (135 lb 6.4 oz)   06/24/24 62.1 kg (137 lb)   06/20/24 62.6 kg (138 lb)   06/20/24 62.6 kg (138 lb)   03/19/24 63 kg (139 lb)   01/18/24 62.7 kg (138 lb 3.2 oz)   09/26/23 62.1 kg (137 lb)   10/27/22 62.1 kg (137 lb)   02/08/22 64.9 kg (143 lb)   01/07/22 64.9 kg (143 lb)     NUTRITION DIAGNOSIS/PROBLEM:   Inadequate oral intake related to Decreased ability to consume sufficient energy due to altered GI function 2/2 perforated duodenal ulcer as evidenced by report of poor po 2 days PTA, current NPO status day 3, and need for PN.    NUTRITION  DIAGNOSIS PROGRESS:  Improvement (unresolved)-- diet initiated/advanced to low fiber/soft, tolerated FLD. Weaned TPN tonight with plans to discontinue tomorrow    NUTRITION INTERVENTION:   NUTRITION PRESCRIPTION:   Estimated Nutrition needs: --dosing wt of 60 kg - wt taken on 11/3/24  Calories: 2008-6946 calories/day (22-25 calories per kg Dosing wt)  Protein: 72 -78 g protein/day (1.2-1.3 g protein/kg Dosing wt)  Fluid Needs: 6644-7061 ml/day (chronological age method (30 ml/kg) or Lara Segar)    - Diet:       Procedures    Low Fiber/Soft diet Low Fiber/Soft; Is Patient on Accuchecks? No      Parenteral Nutrition: RD ordered the following  CPN via right basilic double lumen PICC, 1500 ml total volume, 60 g protein, 550 kcal dextrose (162 g) & 300 kcal lipid. Total energy = 1090 kcal.   Additives and electrolytes ordered.  Meets 83% of min estimated kcal and 83% of min estimated protein needs.     - Meals and snacks: Encouraged adequate PO intake  - Medical Food Supplements: RD added Magic Cup (290 calories/ 9 g protein each) Daily Mixed berry.   - Vitamin and mineral supplements: none  - Feeding assistance: meal set up  - Nutrition education: Discussed importance of adequate energy and protein intake Provided low fiber diet education handout   - Coordination of nutrition care: collaboration with other providers  - Discharge and transfer of nutrition care to new setting or provider: monitor plans    MONITOR AND EVALUATE/NUTRITION GOALS:  - Food and Nutrient Intake:      Monitor: adequacy of PO intake and adequacy of supplement intake   - Food and Nutrient Administration:      Monitor: TPN tolerance, adequacy of TPN, for TPN adjustment, and wean TPN  - Anthropometric Measurement:    Monitor weight   - Nutrition Goals:     maintain wt within 5%, PO and supplement greater than 75% of needs, good supplement intake, return to normal GI function, taper/discontinue TPN, labs within acceptable limits, minimize lean body  mass loss, prevent skin breakdown, and support body systems    DIETITIAN FOLLOW UP: RD to follow and monitor nutrition status/manage TPN daily.     Christa Gorman MS, HOMER, RDN, LDN  Clinical Dietitian  P: [phone]

## 2024-11-12 NOTE — PAYOR COMM NOTE
--------------  11/11- 12 CONTINUED STAY REVIEW    Payor: [facility]  Subscriber #:  [id number]  Authorization Number: [id number]    11/11:     HOSPITALIST:    Patient resting comfortably in chair  No acute distress  No cp, sob, f,c,n,v abd pain or HA   Tolerating sips of liquids       Vital signs:  Temp:  [97.4 °F (36.3 °C)-98.4 °F (36.9 °C)] 97.8 °F (36.6 °C)  Pulse:  [] 78  Resp:  [16-20] 20  BP: (118-137)/() 120/60  SpO2:  [95 %-97 %] 95 %      Physical Exam:    Gen: NAD AO x3  Chest: good air entry CTABL  CVS: normal s1 and s2 RR  Abd: NABS soft NT ND  Neuro: CN 2-12 grossly intact  Ext: no edema in bilateral LE    Scheduled Medications    heparin  5,000 Units Subcutaneous Q8H FAVIO    levothyroxine  65 mcg Intravenous Daily    pantoprazole  40 mg Intravenous Q12H    cefTRIAXone  2 g Intravenous Q24H    metRONIDAZOLE  500 mg Intravenous Q12H    nicotine  1 patch Transdermal Daily          Assessment & Plan:  Acute duodenal perforation  ?PUD  Imaging reviewed  Paraduodenal fluid collection and possible abscess  Contrast study showing extraluminal extravastation of the contrast  GI on consult  Underwent EGD, findings reviewed  UGI with gastrograffin also complete, still showing extravasation  Continue PPI BID and IV abx  CT scan 11/8 showing improvement in contained perforation  Tolerating sips  Will repeat CT abd today  Gsx on consult  Continue IV abx  Continue TPN  Repeat contrast study as above  TPN via PICC line started 11/01/2024  Continue IV Ceftriaxone and Flagyl -> depending on ct today will de-escalate soon  PRN pain control  Hypothyroidism  HLD  Cont home meds     SURGERY:    Feels ok  tolerated liq           Lab Results   Component Value Date     CREATSERUM 0.53 (L) 11/11/2024     BUN 16 11/11/2024      11/11/2024     K 4.1 11/11/2024      11/11/2024     CO2 26.0 11/11/2024     GLU 98 11/11/2024     CA 9.2 11/11/2024     MG 2.2 11/11/2024     PHOS  4.0 11/11/2024      Assessment/Plan:      Patient Active Problem List   Diagnosis    Osteoporosis    Contusion of right chest wall    Perforated diverticulum of duodenum    Duodenal ulcer with perforation (HCC)    Perforated duodenal ulcer (HCC)    Small bowel diverticular disease     Repeat CT today  if healing increase po  and consider discharge planning if tolerates      11/12:    HOSPITALIST:    Tolerating full liquids      Vital signs:  Temp:  [98.1 °F (36.7 °C)-98.7 °F (37.1 °C)] 98.7 °F (37.1 °C)  Pulse:  [80-88] 88  Resp:  [16-20] 20  BP: (115-124)/(47-65) 124/59  SpO2:  [97 %-99 %] 97 %      Physical Exam:    Gen: NAD AO x3  Chest: good air entry CTABL  CVS: normal s1 and s2 RR  Abd: NABS soft NT ND  Neuro: CN 2-12 grossly intact  Ext: no edema in bilateral LE     Lab 11/07/24  0459 11/08/24  0521 11/09/24  0526 11/09/24  1410 11/10/24  0449 11/12/24  0544   WBC 7.2 7.2 7.2  --  7.3 5.7   HGB 12.7 13.3 11.6* 13.7 13.1 12.7   MCV 90.8 91.5 90.4  --  90.3 90.0   .0 307.0 348.0  --  324.0 283.0      Lab 11/06/24  0546 11/07/24  0459 11/08/24  0521 11/09/24  0526 11/10/24  0449 11/11/24  0521 11/12/24  0544   GLU 93  93 117*   < > 91 101* 98 102*   BUN 16  16 18   < > 13 16 16 13   CREATSERUM 0.55  0.55 0.51*   < > 0.50* 0.53* 0.53* 0.49*   CA 9.1  9.1 8.7   < > 8.9 9.2 9.2 8.8   ALB 3.8 3.4   < > 3.5 3.7  --  3.4     140 142   < > 142 143 143 142   K 4.2  4.2 4.0   < > 4.2 4.1 4.1 4.1     109 110   < > 110 110 111 112   CO2 25.0  25.0 25.0   < > 27.0 27.0 26.0 25.0       Scheduled Medications    heparin  5,000 Units Subcutaneous Q8H FAVIO    levothyroxine  65 mcg Intravenous Daily    pantoprazole  40 mg Intravenous Q12H    cefTRIAXone  2 g Intravenous Q24H    metRONIDAZOLE  500 mg Intravenous Q12H    nicotine  1 patch Transdermal Daily               Assessment & Plan:  Acute duodenal perforation  ?PUD  Imaging reviewed  Paraduodenal fluid collection and possible abscess  Contrast study  showing extraluminal extravastation of the contrast -> on CT of 11/11 no extravasation seen  GI on consult  Underwent EGD, findings reviewed  UGI with gastrograffin also complete  Continue PPI BID and IV abx  CT scan 11/8 showing improvement in contained perforation  Tolerating sips  CT scan of 11/11 showing further improvement no extravasation  7.    Given abscess form diverticular perforation, will consult ID for abx recs on dc   8.     Start on FLD and ADAT  Gsx on consult  Continue IV abx  Continue TPN -> stop TPN after tonight   Repeat studies as above  Continue IV Ceftriaxone and Flagyl -> await ID eval regarding abx duration  PRN pain control  Hypothyroidism  HLD  Cont home meds        SURGERY:    Feels ok    11/11 CT ABDOMEN+PELVIS   There has been slight interval decrease in size of extraluminal collection medial to the duodenum compatible with abscess from perforated duodenal diverticulitis. On previous study this measured 6.1 x 2.4 x 3.6 cm. In the same dimensions on current study this collection measures 4.1 x 1.9 x 3.6 cm. In addition, there is no oral contrast clearly identified within this collection to suggest persistent communication. There has also been near total resolution of the previously seen surrounding inflammation. There are no new acute appearing abnormality is within the abdomen.     Assessment/Plan:      Patient Active Problem List   Diagnosis    Osteoporosis    Contusion of right chest wall    Perforated diverticulum of duodenum    Duodenal ulcer with perforation (HCC)    Perforated duodenal ulcer (HCC)    Small bowel diverticular disease     CT  healing   difficult to tell if just diverticuli filling or leakage    Modified solid diet    ID:    Reason for Consultation:  Diverticulitis     ASSESSMENT:     Antibiotics: IV ceftriaxone, flagyl     # Duodenal diverticulitis with perforation and extraluminal fluid with fistula - abscess vs uninfected contents               - most recent CT  A/P 11/11 improved w/o further fistula               - s/p EGD 11/1 with duodena diverticulosis with perforation      PLAN:     -  discontinue IV ceftriaxone and flagyl and monitor closely off abx for now  -  diet per GS  -  Follow fever curve, wbc  -  Reviewed labs, micro, imaging reports, available old records  -  d/w patient, RN, Dr. Peralta, Dr. Alvarez     History of Present Illness:  Dorys Pickens is a a(n) 73 year old female. Patient is a 73-year-old female with a history of COPD, HTN who came to the hospital on 10/29 with acute onset of 1 day epigastric pain with 1 episode of emesis but no fevers.  No antibiotics prior to admission.  Was afebrile with initial WBC of 14.  CT A/P showed a contained perforation of the second portion of the duodenum concern for diverticulitis with a fluid collection as well as duodenal diverticula.  Started on ceftriaxone and Flagyl.  Seen by GI and underwent EGD on 11/1 with findings of duodenal diverticulosis with perforation.  Path negative for malignancy or H. pylori.  Started on TPN via PICC line.  Upper GI study 11/6 with extravasation of contrast noted in the duodenum.  Most recent CT done 11/11 with decreased size of the extraluminal fluid and no further fistula noted.     MEDICATIONS ADMINISTERED IN LAST 1 DAY:  acetaminophen (Ofirmev) 10 mg/mL infusion premix 1,000 mg       Date Action Dose Route User    11/11/2024 1806 New Bag 1,000 mg Intravenous Dorys Matta RN          adult 3 in 1 TPN       Date Action Dose Route User    11/11/2024 2100 New Bag (none) Intravenous Maribel Duran RN          cefTRIAXone (Rocephin) 2 g in sodium chloride 0.9% 100 mL IVPB-ADDV       Date Action Dose Route User    11/11/2024 1642 New Bag 2 g Intravenous Dorys Matta RN          heparin (Porcine) 5000 UNIT/ML injection 5,000 Units       Date Action Dose Route User    11/12/2024 1344 Given 5,000 Units Subcutaneous (Left Lower Abdomen) Dorys Matta RN    11/12/2024 0535 Given  5,000 Units Subcutaneous (Left Lower Abdomen) Maribel Duran RN    11/11/2024 2100 Given 5,000 Units Subcutaneous (Left Lower Abdomen) Maribel Duran RN          levothyroxine (Synthroid) 100 MCG/5ML injection 65 mcg       Date Action Dose Route User    11/12/2024 0930 Given 65 mcg Intravenous Dorys Matta RN          metRONIDAZOLE in sodium chloride 0.79% (Flagyl) 5 mg/mL IVPB premix 500 mg       Date Action Dose Route User    11/12/2024 0541 New Bag 500 mg Intravenous Maribel Duran RN    11/11/2024 1808 New Bag 500 mg Intravenous Dorys Matta, MOSES       pantoprazole (Protonix) 40 mg in sodium chloride 0.9% PF 10 mL IV push       Date Action Dose Route User    11/12/2024 0535 Given 40 mg Intravenous Maribel Duran RN    11/11/2024 1638 Given 40 mg Intravenous Dorys Matta RN

## 2024-11-12 NOTE — PLAN OF CARE
Patient primarily Polish speaking. She denies of pain, no n/v. TPN infusing at 83/hr. Up self, voiding freely. Ambien taken for sleep. On fulls, low appetite. No BM overnight. Safety precautions in place.     Problem: Patient Centered Care  Goal: Patient preferences are identified and integrated in the patient's plan of care  Description: Interventions:  - What would you like us to know as we care for you? I am from home and speak Polish.   - Provide timely, complete, and accurate information to patient/family  - Incorporate patient and family knowledge, values, beliefs, and cultural backgrounds into the planning and delivery of care  - Encourage patient/family to participate in care and decision-making at the level they choose  - Honor patient and family perspectives and choices  Outcome: Progressing     Problem: GASTROINTESTINAL - ADULT  Goal: Minimal or absence of nausea and vomiting  Description: INTERVENTIONS:  - Maintain adequate hydration with IV or PO as ordered and tolerated  - Nasogastric tube to low intermittent suction as ordered  - Evaluate effectiveness of ordered antiemetic medications  - Provide nonpharmacologic comfort measures as appropriate  - Advance diet as tolerated, if ordered  - Obtain nutritional consult as needed  - Evaluate fluid balance  Outcome: Progressing  Goal: Maintains or returns to baseline bowel function  Description: INTERVENTIONS:  - Assess bowel function  - Maintain adequate hydration with IV or PO as ordered and tolerated  - Evaluate effectiveness of GI medications  - Encourage mobilization and activity  - Obtain nutritional consult as needed  - Establish a toileting routine/schedule  - Consider collaborating with pharmacy to review patient's medication profile  Outcome: Progressing

## 2024-11-12 NOTE — PROGRESS NOTES
[facility]     Dorys Pickens Patient Status:  Inpatient    1950 MRN [id number]   Location [facility] 4W/SW/SE Attending Rei Peralta MD   Hosp Day # 14 PCP Cristel Arias MD     Subjective:     Patient resting comfortably in chair  No acute distress  No cp, sob, f,c,n,v abd pain or HA   Tolerating full liquids    Objective:    Review of Systems:   ROS completed; pertinent positive and negatives stated in subjective.      Vital signs:  Temp:  [98.1 °F (36.7 °C)-98.7 °F (37.1 °C)] 98.7 °F (37.1 °C)  Pulse:  [80-88] 88  Resp:  [16-20] 20  BP: (115-124)/(47-65) 124/59  SpO2:  [97 %-99 %] 97 %      Physical Exam:    Gen: NAD AO x3  Chest: good air entry CTABL  CVS: normal s1 and s2 RR  Abd: NABS soft NT ND  Neuro: CN 2-12 grossly intact  Ext: no edema in bilateral LE      Diagnostic Data:    Labs:  Recent Labs   Lab 24  0459 24  0521 24  0526 24  1410 11/10/24  04424  0544   WBC 7.2 7.2 7.2  --  7.3 5.7   HGB 12.7 13.3 11.6* 13.7 13.1 12.7   MCV 90.8 91.5 90.4  --  90.3 90.0   .0 307.0 348.0  --  324.0 283.0       Recent Labs   Lab 24  0546 24  0459 24  0521 24  0526 11/10/24  0449 24  0521 24  0544   GLU 93  93 117*   < > 91 101* 98 102*   BUN 16  16 18   < > 13 16 16 13   CREATSERUM 0.55  0.55 0.51*   < > 0.50* 0.53* 0.53* 0.49*   CA 9.1  9.1 8.7   < > 8.9 9.2 9.2 8.8   ALB 3.8 3.4   < > 3.5 3.7  --  3.4     140 142   < > 142 143 143 142   K 4.2  4.2 4.0   < > 4.2 4.1 4.1 4.1     109 110   < > 110 110 111 112   CO2 25.0  25.0 25.0   < > 27.0 27.0 26.0 25.0   ALKPHO 53* 48*  --   --  50*  --   --    AST 31 16  --   --  16  --   --    ALT 43 28  --   --  20  --   --    BILT 0.2 0.2  --   --  0.2  --   --    TP 6.3 5.7  --   --  6.0  --   --     < > = values in this interval not displayed.       Estimated Creatinine Clearance: 78.8  mL/min (A) (based on SCr of 0.49 mg/dL (L)).    No results for input(s): \"PTP\", \"INR\" in the last 168 hours.           Imaging: Imaging data reviewed in Epic.    Medications:    heparin  5,000 Units Subcutaneous Q8H FAVIO    levothyroxine  65 mcg Intravenous Daily    pantoprazole  40 mg Intravenous Q12H    cefTRIAXone  2 g Intravenous Q24H    metRONIDAZOLE  500 mg Intravenous Q12H    nicotine  1 patch Transdermal Daily       Assessment & Plan:     Acute duodenal perforation  ?PUD  Imaging reviewed  Paraduodenal fluid collection and possible abscess  Contrast study showing extraluminal extravastation of the contrast -> on CT of 11/11 no extravasation seen  GI on consult  Underwent EGD, findings reviewed  UGI with gastrograffin also complete  Continue PPI BID and IV abx  CT scan 11/8 showing improvement in contained perforation  Tolerating sips  CT scan of 11/11 showing further improvement no extravasation  7.    Given abscess form diverticular perforation, will consult ID for abx recs on dc   8.     Start on FLD and ADAT  Gsx on consult  Continue IV abx  Continue TPN -> stop TPN after tonight   Repeat studies as above  Continue IV Ceftriaxone and Flagyl -> await ID eval regarding abx duration  PRN pain control  Hypothyroidism  HLD  Cont home meds      Plan of care discussed with patient at bedside.      Supplementary Documentation:     Quality:  DVT Prophylaxis: heparin subcutaneous       Estimated date of discharge: TBD  Discharge is dependent on: clinical stability  At this point Ms. Pickens is expected to be discharge to: home    MDM: High

## 2024-11-12 NOTE — PROGRESS NOTES
[facility]  part of [facility]    Progress Note    Dorys Pickens Patient Status:  Inpatient    1950 MRN [id number]   Location [facility] 4W/SW/SE Attending Rei Peralta MD   Hosp Day # 14 PCP Cristel Arias MD     Subjective:  Feels ok     Objective/Physical Exam:  General: Alert, orientated x3.  Cooperative.  No apparent distress.  Vital Signs:  Blood pressure 124/59, pulse 88, temperature 98.7 °F (37.1 °C), temperature source Oral, resp. rate 20, height 5' 1.42\" (1.56 m), weight 135 lb 6.4 oz (61.4 kg), SpO2 97%, not currently breastfeeding.      Labs:  Lab Results   Component Value Date    WBC 5.7 2024    HGB 12.7 2024    HCT 37.6 2024    .0 2024    CREATSERUM 0.49 (L) 2024    BUN 13 2024     2024    K 4.1 2024     2024    CO2 25.0 2024     (H) 2024    CA 8.8 2024    ALB 3.4 2024    ALKPHO 50 (L) 11/10/2024    BILT 0.2 11/10/2024    TP 6.0 11/10/2024    AST 16 11/10/2024    ALT 20 11/10/2024    T4F 1.5 2024    TSH 2.029 2024    LIP 29 10/29/2024    MG 2.1 2024    PHOS 3.9 2024       Imaging:  CT ABDOMEN+PELVIS(CONTRAST ONLY)(CPT=74177)    Result Date: 2024  PROCEDURE: CT ABDOMEN + PELVIS (CONTRAST ONLY) (CPT=74177)  COMPARISON: [facility], CT ABDOMEN + PELVIS (CONTRAST ONLY) (CPT=74177), 2024, 7:27 AM.  INDICATIONS: duodenal diverticuli leak  TECHNIQUE: CT images of the abdomen and pelvis were obtained with non-ionic intravenous contrast material.  Automated exposure control for dose reduction was used. Adjustment of the mA and/or kV was done based on the patient's size. Use of iterative reconstruction technique for dose reduction was used.  Dose information is transmitted to the ACR (American College of Radiology) NRDR (National Radiology Data Registry) which includes the Dose Index Registry.  FINDINGS:   The  following findings remain:  1. There has been slight interval decrease in size of extraluminal collection medial to the duodenum compatible with abscess from perforated duodenal diverticulitis.  On previous study this measured 6.1 x 2.4 x 3.6 cm. In the same dimensions on current study this collection measures 4.1 x 1.9 x 3.6 cm.  In addition, there is no oral contrast clearly identified within this collection to suggest persistent communication.  There has also been near total resolution of the previously seen surrounding inflammation.  There are no new acute appearing abnormality is within the abdomen.  2. The liver demonstrates slight decreased attenuation compatible with minimal fatty infiltration.  There are no focal hepatic lesions clearly identified.  3. The gallbladder is surgically absent.  There is no common or intrahepatic biliary ductal dilatation.  4. There are moderate atherosclerotic calcifications within a nonaneurysmal abdominal aorta and iliac arterial system.  5.  There is a focal lumbar scoliosis with apex at the L3-L4 level.  The finding is associated with are moderate to severe degenerative changes at the L3-L4 level.  There are moderate degenerative changes throughout the remainder of the lumbar There is mild central compression of the superior aspects of the TA and T10 vertebral bodies.  There is no acute fracture identified.  Spine.  The remainder of the examination is unremarkable.  Specifically, the lung bases are clear.  The visualized aspects of the spleen, pancreas, adrenals, kidneys, stomach, small bowel, colon, and remaining soft tissues demonstrated grossly normal CT appearance for a patient of this age.  There is no free fluid or organized fluid collection.  There is no significant adenopathy.         CONCLUSION: There has been slight interval decrease in size of extraluminal collection medial to the duodenum compatible with abscess from perforated duodenal diverticulitis.  On  previous study this measured 6.1 x 2.4 x 3.6 cm. In the same dimensions on current study this collection measures 4.1 x 1.9 x 3.6 cm.  In addition, there is no oral contrast clearly identified within this collection to suggest persistent communication.  There has also been near total resolution of the previously seen surrounding inflammation.  There are no new acute appearing abnormality is within the abdomen.  Stable nonacute findings are present and are described within the body of the report.   Dictated by (CST): Olayinka Lam MD on 11/11/2024 at 2:05 PM     Finalized by (CST): Olayinka Lam MD on 11/11/2024 at 2:17 PM          CT ABDOMEN+PELVIS(CONTRAST ONLY)(CPT=74177)    Result Date: 11/8/2024  PROCEDURE: CT ABDOMEN + PELVIS (CONTRAST ONLY) (CPT=74177)  COMPARISON: [facility], XR UGI/ESOPHAGUS SINGLE CONTRAST (CPT=74240), 11/06/2024, 11:47 AM.  [facility], XR UGI/ESOPHAGUS SINGLE CONTRAST (CPT=74240), 10/31/2024, 10:05 AM.  [facility], CT ABDOMEN + PELVIS (CONTRAST ONLY) (CPT=74177), 10/29/2024, 3:22 PM.  INDICATIONS: Duodenal diverticulum leak  TECHNIQUE: CT images of the abdomen and pelvis were obtained with non-ionic intravenous contrast material.  Automated exposure control for dose reduction was used. Adjustment of the mA and/or kV was done based on the patient's size. Use of iterative reconstruction technique for dose reduction was used.  Dose information is transmitted to the ACR (American College of Radiology) NRDR (National Radiology Data Registry) which includes the Dose Index Registry.  FINDINGS:  LIVER: Diffuse low attenuation seen throughout the liver compatible with fatty infiltration. GALLBLADDER: The patient is post cholecystectomy. BILIARY: Mild prominence of the common bile duct and intrahepatic ducts, an expected finding post cholecystectomy. No gross intra-or extrahepatic biliary ductal dilation. SPLEEN: Unremarkable PANCREAS: The  pancreas enhances symmetrically. No ductal dilation. ADRENALS: Unremarkable KIDNEYS: The kidneys enhance symmetrically. There is no hydronephrosis.  AORTA/VASCULAR:   There is atherosclerotic calcification of the abdominal aorta extending into bilateral iliac arteries. RETROPERITONEUM: No mass or enlarged adenopathy.  BOWEL:  Again visualized is a large diverticulum seen at the level of the 2nd portion of the duodenum.  This measures approximately 2.9 x 3.6 by 4.9 cm.  Previously visualized extraluminal pocket of gas and fluid has decreased in size measuring approximately 2.6 x 2.1 cm.  There previously visualized inflammation has improved but not entirely resolved.  No new collection. There is diverticulosis involving the distal descending colon and sigmoid colon without evidence of diverticulitis. The appendix is normal. There are no inflammatory changes within the right lower quadrant.  Remainder of the bowel is otherwise unremarkable without dilation or wall thickening. MESENTERY: Normal.  No mass or hernia.   PELVIS: No enlarged mass or adenopathy. No bladder wall thickening. BONES:   There is degenerative disease of the thoracic and lumbar spine. Degenerative changes are seen within the sacroiliac joints and pubic symphysis. Degenerative changes are seen in the bilateral hips. LUNG BASES: There is bibasilar and lingular atelectasis and scarring. There are coronary artery calcifications.         CONCLUSION:   Perforated duodenal diverticulitis has improved but not resolved since 10/29/2024.  2.6 cm area of contained perforation has decreased in size but not resolved.  Inflammation within the right upper quadrant has improved but not entirely resolved.  No new collection or drainable abscess.     Dictated by (CST): Jose Guerrero MD on 11/08/2024 at 8:49 AM     Finalized by (CST): Jose Guerrero MD on 11/08/2024 at 9:03 AM          XR UGI/ESOPHAGUS SINGLE CONTRAST (CPT=74240)    Result Date: 11/6/2024  PROCEDURE: XR  UGI/ESOPHAGUS SINGLE CONTRAST (CPT=74240)  COMPARISON: [facility], XR UGI/ESOPHAGUS SINGLE CONTRAST (CPT=74240), 10/31/2024, 10:05 AM.   INDICATIONS: Duodenal perforation.  Follow-up for contained extravasation of contrast at the descending duodenal sweep seen on previous study of 10/31/2024.  TECHNIQUE:   A single contrast esophagram and upper gastrointestinal series was performed with fluoroscopy in the usual manner.   FLUOROSCOPY IMAGES OBTAINED:  19 FLUOROSCOPY TIME:  2.5 minute RADIATION DOSE (Dose Area Product):  3519.8-uGy*m^2    FINDINGS:    The preliminary  film of the abdomen still demonstrates residual contrast in the ascending, transverse and descending colon which is slightly less than that seen previously .  ESOPHAGUS STRUCTURE:   Normal.  No visible obstruction, stricture or dilation.  MOTILITY:   Normal.  HERNIA:   None visible.  REFLUX:   None visible.  OTHER:   Negative.   UPPER GI STOMACH:   Normal.  No obstruction, mass or ulceration.  DUODENUM:   Persistent localized contrast extravasation at the medial aspect of the descending portion of the duodenal sweep which is about the same 2 perhaps slightly larger than that noted on the original study of 10/31/2024, and overall measures maximally about 5.1 x 3.8 cm and again suggests a contained site of perforation.  Possibility of this representing a large atypical diverticulum cannot entirely be excluded.  Again noted is a stable diverticulum of the transverse portion the duodenal sweep measuring 3.4 x 3.3 cm. OTHER:   Negative.              CONCLUSION:  1. Normal esophagus and stomach. 2. Persistent localized contrast extravasation at the medial aspect of the descending portion of the duodenal sweep which is about the same to perhaps slightly larger than that seen on the original study of 10/31/2024 as described above.  The possibility  of this representing a large atypical diverticulum cannot entirely be excluded.  See above.     Dictated by (CST): Paul Aranda MD on 11/06/2024 at 1:09 PM     Finalized by (CST): Paul Aranda MD on 11/06/2024 at 1:16 PM          XR ABDOMEN (1 VIEW) (CPT=74018)    Result Date: 11/5/2024  PROCEDURE: XR ABDOMEN (1 VIEW) (CPT=74018)  COMPARISON: [facility], XR ABDOMEN (1 VIEW) (CPT=74018), 11/04/2024, 9:51 AM.  INDICATIONS: Duodenal Perforation.  TECHNIQUE:   Single view.   FINDINGS:  BOWEL GAS PATTERN: There is still a moderate amount of contrast throughout the ascending, transverse and descending colon which would obscure the site of extravasation of contrast from the known duodenal diverticulum.  This will be attempted tomorrow once the contrast hopefully clears from the colon.  No bowel obstruction. SOFT TISSUES: Normal.  No masses or organomegaly.  CALCIFICATIONS: None significant. BONES: Normal.  No significant arthritic changes.  OTHER: Negative.  No abnormal gaseous collections.          CONCLUSION:  1. Persistent moderate contrast in the colon obscuring the region of the duodenal diverticulum extravasation.  The examination will be attempted again tomorrow.    Dictated by (CST): Paul Aranda MD on 11/05/2024 at 1:29 PM     Finalized by (CST): Paul Aranda MD on 11/05/2024 at 1:30 PM          XR ABDOMEN (1 VIEW) (CPT=74018)    Result Date: 11/4/2024  PROCEDURE: XR ABDOMEN (1 VIEW) (CPT=74018)  COMPARISON: None.  INDICATIONS: Duodenal diverticulum - perforation.  TECHNIQUE:   Single view.   FINDINGS:  BOWEL GAS PATTERN: There is large amount of contrast in the ascending, transverse and proximal descending colon with the transverse colon contrast obscuring the region of the duodenal perforation, and the examination will be rescheduled for tomorrow once  the colon contrast has been evacuated. SOFT TISSUES: Normal.  No masses or organomegaly.  CALCIFICATIONS: None significant. BONES: Normal.  No significant arthritic changes.  OTHER: Negative.  No abnormal gaseous collections.           CONCLUSION:  1. There is a large amount of contrast in the ascending, transverse and proximal descending colon with the transverse colon contrast obscuring the region of the duodenal perforation , and the examination will be rescheduled for tomorrow once the colon contrast has been evacuated.    Dictated by (CST): Paul Aranda MD on 11/04/2024 at 12:14 PM     Finalized by (CST): Paul Aranda MD on 11/04/2024 at 12:16 PM          XR UGI/ESOPHAGUS SINGLE CONTRAST (CPT=74240)    Result Date: 10/31/2024  PROCEDURE: XR UGI/ESOPHAGUS SINGLE CONTRAST (CPT=74240)  COMPARISON: [facility], CT ABDOMEN + PELVIS (CONTRAST ONLY) (CPT=74177), 10/29/2024, 3:22 PM.   INDICATIONS: Contained perforation of the descending duodenum on recent CT scan..  TECHNIQUE:   A single contrast esophagram and upper gastrointestinal series was performed with fluoroscopy in the usual manner.  The patient was inadvertently given a moderate amount of thin barium for this examination as opposed to water-soluble Gastrografin contrast.   FLUOROSCOPY IMAGES OBTAINED:  22 FLUOROSCOPY TIME:  2.6 minute RADIATION DOSE (Dose Area Product):  3830.1-uGy*m^2 FINDINGS:  ESOPHAGUS STRUCTURE:   Normal.  No visible obstruction, stricture or dilation.  MOTILITY:   Normal.  HERNIA:   None visible.  REFLUX:   None visible.  OTHER:   Negative.   UPPER GI STOMACH:   Poorly distended stomach because of lack of air contrast technique.  No well-defined mass or outlet obstruction. DUODENUM:   There is apparent extraluminal extravasation of contrast at the descending portion of the duodenum where there is a 2.3 x 2.4 cm collection of contrast which extends beyond the lumen of the duodenum, it is difficult to tell if this is all free contained extravasated contrast at a site of perforation or if there may be an underlying diverticulum with associated adjacent extravasation from a duodenal diverticulum perforation.  There is mild spasm poor distention of  the descending portion of  the duodenal sweep.  There are 2 moderate-sized diverticula identified in transverse portion the duodenum with one measuring 2.9 x 3.1 cm, and adjacent 1 measuring 1.2 x 1.4 cm.  There does not appear to be extravasation or perforation from the 2 adjacent diverticula of the duodenal sweep. OTHER:   Negative.              CONCLUSION:  1. There is apparent extraluminal extravasation of contrast at the descending portion of the duodenal sweep just distal to the duodenal bulb which appears to be contained and overall measures about 2.3 x 2.4 cm, and it is difficult to tell if this is all  extraluminal contained contrast or if there may be a diverticulum at this site with adjacent extravasation of contrast from a duodenal diverticular rupture.  There is no well-defined mass.  There is mild spasm and poor distention of the descending portion of the duodenal sweep.  Correlate clinically and with endoscopy.  There are two small to moderate adjacent diverticula noted of the transverse portion of the duodenal sweep without evidence of perforation or contrast extravasation. 2. Normal esophagus.  Normal appearance to the stomach which is poorly distended though.  No well-defined mass.    Dictated by (CST): Paul Aranda MD on 10/31/2024 at 1:24 PM     Finalized by (CST): Paul Aranda MD on 10/31/2024 at 1:38 PM          CT ABDOMEN+PELVIS(CONTRAST ONLY)(CPT=74177)    Result Date: 10/29/2024  PROCEDURE: CT ABDOMEN + PELVIS (CONTRAST ONLY) (CPT=74177)  COMPARISON: None.  INDICATIONS: Epigastric pain, nausea, and vomiting.  TECHNIQUE: CT images of the abdomen and pelvis were obtained with non-ionic intravenous contrast material.  Automated exposure control for dose reduction was used. Adjustment of the mA and/or kV was done based on the patient's size. Use of iterative reconstruction technique for dose reduction was used.  Dose information is transmitted to the ACR (American College of Radiology) NRDR  (National Radiology Data Registry) which includes the Dose Index Registry.  FINDINGS:  LIVER: Too small to characterize hypoattenuating hepatic dome lesion. BILIARY: No evidence for biliary dilatation. Post cholecystectomy. SPLEEN: Normal.  PANCREAS: Normal.  ADRENALS: Normal. KIDNEYS: Normal. AORTA/VASCULAR: Atherosclerotic vascular calcification including coronary artery calcification. No aneurysm or dissection.  LYMPHADENOPATHY: None. GI/MESENTERY: Multiple duodenal diverticula with a contained perforation of the 2nd portion of the duodenum with surrounding inflammatory changes including small para duodenal fluid collections measuring up to 1.6 x 3.6 cm Normal appendix.  Colonic diverticulosis.  No obstruction, bowel wall thickening, or mesenteric mass. ABDOMINAL WALL: Normal.  No mass or hernia.  URINARY BLADDER: Normal. ASCITES:   None. PELVIC ORGANS: No suspect pelvic mass. BONES: Mild-to-moderate chronic superior endplate compression fracture T8 and T10.  Minimal chronic anterior wedging of T9 and T11.  Schmorl's node associated with mild chronic superior endplate compression of L4.  No suspect bone lesion. LUNG BASES: Normal.  No visible pulmonary or pleural disease.  OTHER: Negative.          CONCLUSION:  1. Contained perforation 2nd portion of duodenum either related to perforated diverticulitis or ulcer.  Small para duodenal fluid collection measuring approximately 3 x 3.6 x 1.6 cm. 2. Multiple duodenal diverticula. 3. Moderate coronary artery calcification.  4. Findings were called to Dr. Lara.    Dictated by (CST): Dustin Hughes MD on 10/29/2024 at 3:46 PM     Finalized by (CST): Dustin Hughes MD on 10/29/2024 at 4:00 PM          XR CHEST AP PORTABLE  (CPT=71045)    Result Date: 10/29/2024  PROCEDURE: XR CHEST AP PORTABLE  (CPT=71045) TIME: 13:18.   COMPARISON: [facility], XR CHEST AP PORTABLE (CPT=71045), 6/20/2024, 1:27 PM.  [facility], XR CHEST PA + LAT CHEST  (CPT=71046), 1/08/2024, 11:33 AM.  INDICATIONS: Epigastric pain, nausea, and vomitting x4 days. Diarrhea x1 day.  TECHNIQUE:   Single view.   FINDINGS:  CARDIAC/VASC: The cardiomediastinal silhouette is unchanged in size.  There is atherosclerotic calcification of the thoracic aorta. MEDIAST/JENNIFER:   No visible mass or adenopathy. LUNGS/PLEURA: Small bibasilar opacities.  No pleural effusion.  No pneumothorax. BONES: Multilevel degenerative changes of the thoracic spine.  The thoracic compression fracture deformities were better demonstrated the prior radiograph dated 01/08/2024. OTHER: Negative.          CONCLUSION:   Small bibasilar opacities, which may reflect atelectasis with or without superimposed pneumonia.     Dictated by (CST): Lalo King MD on 10/29/2024 at 1:33 PM     Finalized by (CST): Lalo King MD on 10/29/2024 at 1:35 PM            Assessment/Plan:  Patient Active Problem List   Diagnosis    Osteoporosis    Contusion of right chest wall    Perforated diverticulum of duodenum    Duodenal ulcer with perforation (HCC)    Perforated duodenal ulcer (HCC)    Small bowel diverticular disease   CT  healing   difficult to tell if just diverticuli filling or leakage    Modified solid diet    DAVID TREVIÑO MD  11/12/2024  12:45 PM

## 2024-11-13 LAB
ANION GAP SERPL CALC-SCNC: 7 MMOL/L (ref 0–18)
BUN BLD-MCNC: 16 MG/DL (ref 9–23)
BUN/CREAT SERPL: 30.8 (ref 10–20)
CALCIUM BLD-MCNC: 9.1 MG/DL (ref 8.7–10.4)
CHLORIDE SERPL-SCNC: 110 MMOL/L (ref 98–112)
CO2 SERPL-SCNC: 26 MMOL/L (ref 21–32)
CREAT BLD-MCNC: 0.52 MG/DL
EGFRCR SERPLBLD CKD-EPI 2021: 98 ML/MIN/1.73M2 (ref 60–?)
GLUCOSE BLD-MCNC: 105 MG/DL (ref 70–99)
MAGNESIUM SERPL-MCNC: 2 MG/DL (ref 1.6–2.6)
OSMOLALITY SERPL CALC.SUM OF ELEC: 298 MOSM/KG (ref 275–295)
PHOSPHATE SERPL-MCNC: 4 MG/DL (ref 2.4–5.1)
POTASSIUM SERPL-SCNC: 4 MMOL/L (ref 3.5–5.1)
SODIUM SERPL-SCNC: 143 MMOL/L (ref 136–145)

## 2024-11-13 PROCEDURE — 99233 SBSQ HOSP IP/OBS HIGH 50: CPT | Performed by: INTERNAL MEDICINE

## 2024-11-13 RX ORDER — ACETAMINOPHEN 325 MG/1
650 TABLET ORAL EVERY 6 HOURS PRN
Status: DISCONTINUED | OUTPATIENT
Start: 2024-11-13 | End: 2024-11-14

## 2024-11-13 RX ORDER — LEVOTHYROXINE SODIUM 88 UG/1
88 TABLET ORAL
Status: DISCONTINUED | OUTPATIENT
Start: 2024-11-14 | End: 2024-11-14

## 2024-11-13 NOTE — PLAN OF CARE
Patient is alert and oriented. RA. Voiding freely. Up independently. TPN infusing to RUE PICC line. Call light within reach.  Problem: Patient Centered Care  Goal: Patient preferences are identified and integrated in the patient's plan of care  Description: Interventions:  - What would you like us to know as we care for you? I am from home and speak Polish.   - Provide timely, complete, and accurate information to patient/family  - Incorporate patient and family knowledge, values, beliefs, and cultural backgrounds into the planning and delivery of care  - Encourage patient/family to participate in care and decision-making at the level they choose  - Honor patient and family perspectives and choices  Outcome: Progressing     Problem: GASTROINTESTINAL - ADULT  Goal: Minimal or absence of nausea and vomiting  Description: INTERVENTIONS:  - Maintain adequate hydration with IV or PO as ordered and tolerated  - Nasogastric tube to low intermittent suction as ordered  - Evaluate effectiveness of ordered antiemetic medications  - Provide nonpharmacologic comfort measures as appropriate  - Advance diet as tolerated, if ordered  - Obtain nutritional consult as needed  - Evaluate fluid balance  Outcome: Progressing  Goal: Maintains or returns to baseline bowel function  Description: INTERVENTIONS:  - Assess bowel function  - Maintain adequate hydration with IV or PO as ordered and tolerated  - Evaluate effectiveness of GI medications  - Encourage mobilization and activity  - Obtain nutritional consult as needed  - Establish a toileting routine/schedule  - Consider collaborating with pharmacy to review patient's medication profile  Outcome: Progressing     Problem: PAIN - ADULT  Goal: Verbalizes/displays adequate comfort level or patient's stated pain goal  Description: INTERVENTIONS:  - Encourage pt to monitor pain and request assistance  - Assess pain using appropriate pain scale  - Administer analgesics based on type and  severity of pain and evaluate response  - Implement non-pharmacological measures as appropriate and evaluate response  - Consider cultural and social influences on pain and pain management  - Manage/alleviate anxiety  - Utilize distraction and/or relaxation techniques  - Monitor for opioid side effects  - Notify MD/LIP if interventions unsuccessful or patient reports new pain  - Anticipate increased pain with activity and pre-medicate as appropriate  Outcome: Progressing     Problem: RISK FOR INFECTION - ADULT  Goal: Absence of fever/infection during anticipated neutropenic period  Description: INTERVENTIONS  - Monitor WBC  - Administer growth factors as ordered  - Implement neutropenic guidelines  Outcome: Progressing     Problem: SAFETY ADULT - FALL  Goal: Free from fall injury  Description: INTERVENTIONS:  - Assess pt frequently for physical needs  - Identify cognitive and physical deficits and behaviors that affect risk of falls.  - Hayden fall precautions as indicated by assessment.  - Educate pt/family on patient safety including physical limitations  - Instruct pt to call for assistance with activity based on assessment  - Modify environment to reduce risk of injury  - Provide assistive devices as appropriate  - Consider OT/PT consult to assist with strengthening/mobility  - Encourage toileting schedule  Outcome: Progressing     Problem: DISCHARGE PLANNING  Goal: Discharge to home or other facility with appropriate resources  Description: INTERVENTIONS:  - Identify barriers to discharge w/pt and caregiver  - Include patient/family/discharge partner in discharge planning  - Arrange for needed discharge resources and transportation as appropriate  - Identify discharge learning needs (meds, wound care, etc)  - Arrange for interpreters to assist at discharge as needed  - Consider post-discharge preferences of patient/family/discharge partner  - Complete POLST form as appropriate  - Assess patient's ability to  be responsible for managing their own health  - Refer to Case Management Department for coordinating discharge planning if the patient needs post-hospital services based on physician/LIP order or complex needs related to functional status, cognitive ability or social support system  Outcome: Progressing     Problem: Altered Communication/Language Barrier  Goal: Patient/Family is able to understand and participate in their care  Description: Interventions:  - Assess communication ability and preferred communication style  - Implement communication aides and strategies  - Use visual cues when possible  - Listen attentively, be patient, do not interrupt  - Minimize distractions  - Allow time for understanding and response  - Establish method for patient to ask for assistance (call light)  - Provide an  as needed  - Communicate barriers and strategies to overcome with those who interact with patient  Outcome: Progressing

## 2024-11-13 NOTE — PLAN OF CARE
No complaints of nausea or vomitting overnight. TPN continued.  Problem: GASTROINTESTINAL - ADULT  Goal: Minimal or absence of nausea and vomiting  Description: INTERVENTIONS:  - Maintain adequate hydration with IV or PO as ordered and tolerated  - Nasogastric tube to low intermittent suction as ordered  - Evaluate effectiveness of ordered antiemetic medications  - Provide nonpharmacologic comfort measures as appropriate  - Advance diet as tolerated, if ordered  - Obtain nutritional consult as needed  Problem: GASTROINTESTINAL - ADULT  Goal: Minimal or absence of nausea and vomiting  Description: INTERVENTIONS:  - Maintain adequate hydration with IV or PO as ordered and tolerated  - Nasogastric tube to low intermittent suction as ordered  - Evaluate effectiveness of ordered antiemetic medications  - Provide nonpharmacologic comfort measures as appropriate  - Advance diet as tolerated, if ordered  - Obtain nutritional consult as needed  - Evaluate fluid balance  Outcome: Progressing  Goal: Maintains or returns to baseline bowel function  Description: INTERVENTIONS:  - Assess bowel function  - Maintain adequate hydration with IV or PO as ordered and tolerated  - Evaluate effectiveness of GI medications  - Encourage mobilization and activity  - Obtain nutritional consult as needed  - Establish a toileting routine/schedule  - Consider collaborating with pharmacy to review patient's medication profile  Outcome: Progressing     - Evaluate fluid balance  Outcome: Progressing  Goal: Maintains or returns to baseline bowel function  Description: INTERVENTIONS:  - Assess bowel function  - Maintain adequate hydration with IV or PO as ordered and tolerated  - Evaluate effectiveness of GI medications  - Encourage mobilization and activity  - Obtain nutritional consult as needed  - Establish a toileting routine/schedule  - Consider collaborating with pharmacy to review patient's medication profile  Outcome: Progressing      Problem: PAIN - ADULT  Goal: Verbalizes/displays adequate comfort level or patient's stated pain goal  Description: INTERVENTIONS:  - Encourage pt to monitor pain and request assistance  - Assess pain using appropriate pain scale  - Administer analgesics based on type and severity of pain and evaluate response  - Implement non-pharmacological measures as appropriate and evaluate response  - Consider cultural and social influences on pain and pain management  - Manage/alleviate anxiety  - Utilize distraction and/or relaxation techniques  - Monitor for opioid side effects  - Notify MD/LIP if interventions unsuccessful or patient reports new pain  - Anticipate increased pain with activity and pre-medicate as appropriate  Outcome: Progressing

## 2024-11-13 NOTE — PLAN OF CARE
Problem: Patient Centered Care  Goal: Patient preferences are identified and integrated in the patient's plan of care  Description: Interventions:  - What would you like us to know as we care for you? I am from home and speak [place].   - Provide timely, complete, and accurate information to patient/family  - Incorporate patient and family knowledge, values, beliefs, and cultural backgrounds into the planning and delivery of care  - Encourage patient/family to participate in care and decision-making at the level they choose  - Honor patient and family perspectives and choices  Outcome: Progressing   Patient alert and oriented x 4. Dorys denies pain or new complaints. Diet advanced to soft/ low fiber diet, patient has been tolerating food well, denies nausea, had one loose stool. Voiding freely. Weaning off TPN, IV Abx were discontinued. Patient frequently ambulating in halls tolerating activity well. Fall precautions in place.

## 2024-11-13 NOTE — PAYOR COMM NOTE
--------------  11/13 CONTINUED STAY REVIEW    Payor: [facility]  Subscriber #:  [id number]  Authorization Number: [id number]    ID:     Feels well. Has been eating. Remains on TPN. States appetite is good.     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  -  Diet per GS.  -  Follow fever curve, wbc.  -  Reviewed labs, micro, imaging reports, available old records      /71 (BP Location: Left arm)   Pulse 84   Temp 97.8 °F (36.6 °C) (Oral)   Resp 16   Ht 5' 1.42\" (1.56 m)   Wt 135 lb 6.4 oz (61.4 kg)   SpO2 98%   BMI 25.24 kg/m²      Gen:                   Awake, in chair  HEENT:             EOMI, neck supple  CV/lungs:           RRR, CTAB  Abdom:              Soft, NT/ND, +BS  Skin/extrem:      No rashes, no c/c/e  Lines:                 PICC+      HOSPITALIST:    She denied any active complaints at the time of interview. In good spirits this morning and looking forward to discharging home soon.       Vital signs:  Temp:  [97.7 °F (36.5 °C)-98.7 °F (37.1 °C)] 97.8 °F (36.6 °C)  Pulse:  [84-97] 84  Resp:  [16-20] 16  BP: (118-132)/(59-71) 118/71  SpO2:  [96 %-98 %] 98 %        Physical Exam:    Gen: NAD AO x3  Chest: good air entry CTABL  CVS: normal s1 and s2 RR  Abd: NABS soft NT ND  Neuro: CN 2-12 grossly intact  Ext: no edema in bilateral LE      Lab 11/07/24  0459 11/08/24  0521 11/09/24  0526 11/09/24  1410 11/10/24  0449 11/12/24  0544   WBC 7.2 7.2 7.2  --  7.3 5.7   HGB 12.7 13.3 11.6* 13.7 13.1 12.7   MCV 90.8 91.5 90.4  --  90.3 90.0   .0 307.0 348.0  --  324.0 283.0      Lab 11/07/24  0459 11/09/24  0526 11/10/24  0449 11/11/24  0521  11/12/24  0544 11/13/24  0545   * 91 101* 98 102* 105*   BUN 18 13 16 16 13 16   CREATSERUM 0.51* 0.50* 0.53* 0.53* 0.49* 0.52*   CA 8.7 8.9 9.2 9.2 8.8 9.1   ALB 3.4 3.5 3.7  --  3.4  --     142 143 143 142 143   K 4.0 4.2 4.1 4.1 4.1 4.0    110 110 111 112 110   CO2 25.0 27.0 27.0 26.0 25.0 26.0       Scheduled Medications    heparin  5,000 Units Subcutaneous Q8H FAVIO    levothyroxine  65 mcg Intravenous Daily    pantoprazole  40 mg Intravenous Q12H    nicotine  1 patch Transdermal Daily             Assessment & Plan:  Acute duodenal perforation  ?PUD  Imaging reviewed  Paraduodenal fluid collection and possible abscess  Contrast study showing extraluminal extravastation of the contrast -> on CT of 11/11 no extravasation seen  GI on consult  Underwent EGD, findings reviewed  UGI with gastrograffin also complete  Continue PPI BID and IV abx  CT scan 11/8 showing improvement in contained perforation  Tolerating sips  CT scan of 11/11 showing further improvement no extravasation  7.    Given abscess form diverticular perforation, will consult ID for abx recs on dc   8.    FLD and ADAT  Gsx on consult  Wean TPN, ADAT  Repeat studies as above  ID on consult  Monitor off abx at this time  PRN pain control  Hypothyroidism  HLD  Cont home meds     Disposition  Pending medical clearance      MEDICATIONS ADMINISTERED IN LAST 1 DAY:  adult 3 in 1 TPN       Date Action Dose Route User    11/12/2024 2110 New Bag (none) Intravenous Mary Hinds RN          heparin (Porcine) 5000 UNIT/ML injection 5,000 Units       Date Action Dose Route User    11/13/2024 0528 Given 5,000 Units Subcutaneous (Right Lower Abdomen) Dajlit Lopez RN    11/12/2024 2111 Given 5,000 Units Subcutaneous (Right Lower Abdomen) Mary Hinds, MOSES     pantoprazole (Protonix) 40 mg in sodium chloride 0.9% PF 10 mL IV push       Date Action Dose Route User    11/13/2024 0528 Given 40 mg Intravenous Daljit Lopez RN    11/12/2024  1634 Given 40 mg Intravenous Dorys Matta, RN

## 2024-11-13 NOTE — PROGRESS NOTES
[facility]  [facility] Progress [facility]     Dorys Pickens Patient Status:  Inpatient    1950 MRN [id number]   Location [facility] 4W/SW/SE Attending Rei Peralta MD   Hosp Day # 15 PCP Cristel Arias MD     Subjective:     She denied any active complaints at the time of interview. In good spirits this morning and looking forward to discharging home soon.     Objective:    Review of Systems:   ROS completed; pertinent positive and negatives stated in subjective.      Vital signs:  Temp:  [97.7 °F (36.5 °C)-98.7 °F (37.1 °C)] 97.8 °F (36.6 °C)  Pulse:  [84-97] 84  Resp:  [16-20] 16  BP: (118-132)/(59-71) 118/71  SpO2:  [96 %-98 %] 98 %      Physical Exam:    Gen: NAD AO x3  Chest: good air entry CTABL  CVS: normal s1 and s2 RR  Abd: NABS soft NT ND  Neuro: CN 2-12 grossly intact  Ext: no edema in bilateral LE      Diagnostic Data:    Labs:  Recent Labs   Lab 24  0526 24  1410 11/10/24  0449 11/12/24  0544   WBC 7.2 7.2 7.2  --  7.3 5.7   HGB 12.7 13.3 11.6* 13.7 13.1 12.7   MCV 90.8 91.5 90.4  --  90.3 90.0   .0 307.0 348.0  --  324.0 283.0       Recent Labs   Lab 24  0521 24  0526 11/10/24  0449 24  0521 24  0544 24  0545   *   < > 91 101* 98 102* 105*   BUN 18   < > 13 16 16 13 16   CREATSERUM 0.51*   < > 0.50* 0.53* 0.53* 0.49* 0.52*   CA 8.7   < > 8.9 9.2 9.2 8.8 9.1   ALB 3.4   < > 3.5 3.7  --  3.4  --       < > 142 143 143 142 143   K 4.0   < > 4.2 4.1 4.1 4.1 4.0      < > 110 110 111 112 110   CO2 25.0   < > 27.0 27.0 26.0 25.0 26.0   ALKPHO 48*  --   --  50*  --   --   --    AST 16  --   --  16  --   --   --    ALT 28  --   --  20  --   --   --    BILT 0.2  --   --  0.2  --   --   --    TP 5.7  --   --  6.0  --   --   --     < > = values in this interval not displayed.       Estimated Creatinine Clearance: 74.2 mL/min (A) (based on  SCr of 0.52 mg/dL (L)).    No results for input(s): \"PTP\", \"INR\" in the last 168 hours.           Imaging: Imaging data reviewed in Epic.    Medications:    heparin  5,000 Units Subcutaneous Q8H FAVIO    levothyroxine  65 mcg Intravenous Daily    pantoprazole  40 mg Intravenous Q12H    nicotine  1 patch Transdermal Daily       Assessment & Plan:     Acute duodenal perforation  ?PUD  Imaging reviewed  Paraduodenal fluid collection and possible abscess  Contrast study showing extraluminal extravastation of the contrast -> on CT of 11/11 no extravasation seen  GI on consult  Underwent EGD, findings reviewed  UGI with gastrograffin also complete  Continue PPI BID and IV abx  CT scan 11/8 showing improvement in contained perforation  Tolerating sips  CT scan of 11/11 showing further improvement no extravasation  7.    Given abscess form diverticular perforation, will consult ID for abx recs on dc   8.    FLD and ADAT  Gsx on consult  Wean TPN, ADAT  Repeat studies as above  ID on consult  Monitor off abx at this time  PRN pain control  Hypothyroidism  HLD  Cont home meds    Disposition  Pending medical clearance    Plan of care discussed with patient at bedside.      Supplementary Documentation:     Quality:  DVT Prophylaxis: heparin subcutaneous           MDM: High

## 2024-11-13 NOTE — PLAN OF CARE
Problem: Patient Centered Care  Goal: Patient preferences are identified and integrated in the patient's plan of care  Description: Interventions:  - What would you like us to know as we care for you? I am from home and speak [place].   - Provide timely, complete, and accurate information to patient/family  - Incorporate patient and family knowledge, values, beliefs, and cultural backgrounds into the planning and delivery of care  - Encourage patient/family to participate in care and decision-making at the level they choose  - Honor patient and family perspectives and choices  Outcome: Progressing

## 2024-11-13 NOTE — PROGRESS NOTES
[facility]  part of [facility]    Progress Note    Dorys Pickens Patient Status:  Inpatient    1950 MRN [id number]   Location [facility] 4W/SW/SE Attending Arpit Hutchins MD   Hosp Day # 15 PCP Cristel Arias MD     Subjective:  Feels ok  stool +   tolerated solids    Objective/Physical Exam:  General: Alert, orientated x3.  Cooperative.  No apparent distress.  Vital Signs:    Abdomen:    Grossly neg  Labs:  Lab Results   Component Value Date    WBC 5.7 2024    HGB 12.7 2024    HCT 37.6 2024    .0 2024    CREATSERUM 0.52 (L) 2024    BUN 16 2024     2024    K 4.0 2024     2024    CO2 26.0 2024     (H) 2024    CA 9.1 2024    ALB 3.4 2024    ALKPHO 50 (L) 11/10/2024    BILT 0.2 11/10/2024    TP 6.0 11/10/2024    AST 16 11/10/2024    ALT 20 11/10/2024    T4F 1.5 2024    TSH 2.029 2024    LIP 29 10/29/2024    MG 2.0 2024    PHOS 4.0 2024       Imaging:  CT ABDOMEN+PELVIS(CONTRAST ONLY)(CPT=74177)    Result Date: 2024  PROCEDURE: CT ABDOMEN + PELVIS (CONTRAST ONLY) (CPT=74177)  COMPARISON: [facility], CT ABDOMEN + PELVIS (CONTRAST ONLY) (CPT=74177), 2024, 7:27 AM.  INDICATIONS: duodenal diverticuli leak  TECHNIQUE: CT images of the abdomen and pelvis were obtained with non-ionic intravenous contrast material.  Automated exposure control for dose reduction was used. Adjustment of the mA and/or kV was done based on the patient's size. Use of iterative reconstruction technique for dose reduction was used.  Dose information is transmitted to the ACR (American College of Radiology) NRDR (National Radiology Data Registry) which includes the Dose Index Registry.  FINDINGS:   The following findings remain:  1. There has been slight interval decrease in size of extraluminal collection medial to the duodenum compatible with abscess from  perforated duodenal diverticulitis.  On previous study this measured 6.1 x 2.4 x 3.6 cm. In the same dimensions on current study this collection measures 4.1 x 1.9 x 3.6 cm.  In addition, there is no oral contrast clearly identified within this collection to suggest persistent communication.  There has also been near total resolution of the previously seen surrounding inflammation.  There are no new acute appearing abnormality is within the abdomen.  2. The liver demonstrates slight decreased attenuation compatible with minimal fatty infiltration.  There are no focal hepatic lesions clearly identified.  3. The gallbladder is surgically absent.  There is no common or intrahepatic biliary ductal dilatation.  4. There are moderate atherosclerotic calcifications within a nonaneurysmal abdominal aorta and iliac arterial system.  5.  There is a focal lumbar scoliosis with apex at the L3-L4 level.  The finding is associated with are moderate to severe degenerative changes at the L3-L4 level.  There are moderate degenerative changes throughout the remainder of the lumbar There is mild central compression of the superior aspects of the TA and T10 vertebral bodies.  There is no acute fracture identified.  Spine.  The remainder of the examination is unremarkable.  Specifically, the lung bases are clear.  The visualized aspects of the spleen, pancreas, adrenals, kidneys, stomach, small bowel, colon, and remaining soft tissues demonstrated grossly normal CT appearance for a patient of this age.  There is no free fluid or organized fluid collection.  There is no significant adenopathy.         CONCLUSION: There has been slight interval decrease in size of extraluminal collection medial to the duodenum compatible with abscess from perforated duodenal diverticulitis.  On previous study this measured 6.1 x 2.4 x 3.6 cm. In the same dimensions on current study this collection measures 4.1 x 1.9 x 3.6 cm.  In addition, there is no  oral contrast clearly identified within this collection to suggest persistent communication.  There has also been near total resolution of the previously seen surrounding inflammation.  There are no new acute appearing abnormality is within the abdomen.  Stable nonacute findings are present and are described within the body of the report.   Dictated by (CST): Olayinka Lam MD on 11/11/2024 at 2:05 PM     Finalized by (CST): Olayinka Lam MD on 11/11/2024 at 2:17 PM          CT ABDOMEN+PELVIS(CONTRAST ONLY)(CPT=74177)    Result Date: 11/8/2024  PROCEDURE: CT ABDOMEN + PELVIS (CONTRAST ONLY) (CPT=74177)  COMPARISON: [facility], XR UGI/ESOPHAGUS SINGLE CONTRAST (CPT=74240), 11/06/2024, 11:47 AM.  [facility], XR UGI/ESOPHAGUS SINGLE CONTRAST (CPT=74240), 10/31/2024, 10:05 AM.  [facility], CT ABDOMEN + PELVIS (CONTRAST ONLY) (CPT=74177), 10/29/2024, 3:22 PM.  INDICATIONS: Duodenal diverticulum leak  TECHNIQUE: CT images of the abdomen and pelvis were obtained with non-ionic intravenous contrast material.  Automated exposure control for dose reduction was used. Adjustment of the mA and/or kV was done based on the patient's size. Use of iterative reconstruction technique for dose reduction was used.  Dose information is transmitted to the ACR (American College of Radiology) NRDR (National Radiology Data Registry) which includes the Dose Index Registry.  FINDINGS:  LIVER: Diffuse low attenuation seen throughout the liver compatible with fatty infiltration. GALLBLADDER: The patient is post cholecystectomy. BILIARY: Mild prominence of the common bile duct and intrahepatic ducts, an expected finding post cholecystectomy. No gross intra-or extrahepatic biliary ductal dilation. SPLEEN: Unremarkable PANCREAS: The pancreas enhances symmetrically. No ductal dilation. ADRENALS: Unremarkable KIDNEYS: The kidneys enhance symmetrically. There is no hydronephrosis.  AORTA/VASCULAR:    There is atherosclerotic calcification of the abdominal aorta extending into bilateral iliac arteries. RETROPERITONEUM: No mass or enlarged adenopathy.  BOWEL:  Again visualized is a large diverticulum seen at the level of the 2nd portion of the duodenum.  This measures approximately 2.9 x 3.6 by 4.9 cm.  Previously visualized extraluminal pocket of gas and fluid has decreased in size measuring approximately 2.6 x 2.1 cm.  There previously visualized inflammation has improved but not entirely resolved.  No new collection. There is diverticulosis involving the distal descending colon and sigmoid colon without evidence of diverticulitis. The appendix is normal. There are no inflammatory changes within the right lower quadrant.  Remainder of the bowel is otherwise unremarkable without dilation or wall thickening. MESENTERY: Normal.  No mass or hernia.   PELVIS: No enlarged mass or adenopathy. No bladder wall thickening. BONES:   There is degenerative disease of the thoracic and lumbar spine. Degenerative changes are seen within the sacroiliac joints and pubic symphysis. Degenerative changes are seen in the bilateral hips. LUNG BASES: There is bibasilar and lingular atelectasis and scarring. There are coronary artery calcifications.         CONCLUSION:   Perforated duodenal diverticulitis has improved but not resolved since 10/29/2024.  2.6 cm area of contained perforation has decreased in size but not resolved.  Inflammation within the right upper quadrant has improved but not entirely resolved.  No new collection or drainable abscess.     Dictated by (CST): Jose Guerrero MD on 11/08/2024 at 8:49 AM     Finalized by (CST): Jose Guerrero MD on 11/08/2024 at 9:03 AM          XR UGI/ESOPHAGUS SINGLE CONTRAST (CPT=74240)    Result Date: 11/6/2024  PROCEDURE: XR UGI/ESOPHAGUS SINGLE CONTRAST (CPT=74240)  COMPARISON: [facility], XR UGI/ESOPHAGUS SINGLE CONTRAST (CPT=74240), 10/31/2024, 10:05 AM.    INDICATIONS: Duodenal perforation.  Follow-up for contained extravasation of contrast at the descending duodenal sweep seen on previous study of 10/31/2024.  TECHNIQUE:   A single contrast esophagram and upper gastrointestinal series was performed with fluoroscopy in the usual manner.   FLUOROSCOPY IMAGES OBTAINED:  19 FLUOROSCOPY TIME:  2.5 minute RADIATION DOSE (Dose Area Product):  3519.8-uGy*m^2    FINDINGS:    The preliminary  film of the abdomen still demonstrates residual contrast in the ascending, transverse and descending colon which is slightly less than that seen previously .  ESOPHAGUS STRUCTURE:   Normal.  No visible obstruction, stricture or dilation.  MOTILITY:   Normal.  HERNIA:   None visible.  REFLUX:   None visible.  OTHER:   Negative.   UPPER GI STOMACH:   Normal.  No obstruction, mass or ulceration.  DUODENUM:   Persistent localized contrast extravasation at the medial aspect of the descending portion of the duodenal sweep which is about the same 2 perhaps slightly larger than that noted on the original study of 10/31/2024, and overall measures maximally about 5.1 x 3.8 cm and again suggests a contained site of perforation.  Possibility of this representing a large atypical diverticulum cannot entirely be excluded.  Again noted is a stable diverticulum of the transverse portion the duodenal sweep measuring 3.4 x 3.3 cm. OTHER:   Negative.              CONCLUSION:  1. Normal esophagus and stomach. 2. Persistent localized contrast extravasation at the medial aspect of the descending portion of the duodenal sweep which is about the same to perhaps slightly larger than that seen on the original study of 10/31/2024 as described above.  The possibility  of this representing a large atypical diverticulum cannot entirely be excluded.  See above.    Dictated by (CST): Paul Aranda MD on 11/06/2024 at 1:09 PM     Finalized by (CST): Paul Aranda MD on 11/06/2024 at 1:16 PM          XR ABDOMEN (1  VIEW) (CPT=74018)    Result Date: 11/5/2024  PROCEDURE: XR ABDOMEN (1 VIEW) (CPT=74018)  COMPARISON: [facility], XR ABDOMEN (1 VIEW) (CPT=74018), 11/04/2024, 9:51 AM.  INDICATIONS: Duodenal Perforation.  TECHNIQUE:   Single view.   FINDINGS:  BOWEL GAS PATTERN: There is still a moderate amount of contrast throughout the ascending, transverse and descending colon which would obscure the site of extravasation of contrast from the known duodenal diverticulum.  This will be attempted tomorrow once the contrast hopefully clears from the colon.  No bowel obstruction. SOFT TISSUES: Normal.  No masses or organomegaly.  CALCIFICATIONS: None significant. BONES: Normal.  No significant arthritic changes.  OTHER: Negative.  No abnormal gaseous collections.          CONCLUSION:  1. Persistent moderate contrast in the colon obscuring the region of the duodenal diverticulum extravasation.  The examination will be attempted again tomorrow.    Dictated by (CST): Paul Aranda MD on 11/05/2024 at 1:29 PM     Finalized by (CST): Paul Aranda MD on 11/05/2024 at 1:30 PM          XR ABDOMEN (1 VIEW) (CPT=74018)    Result Date: 11/4/2024  PROCEDURE: XR ABDOMEN (1 VIEW) (CPT=74018)  COMPARISON: None.  INDICATIONS: Duodenal diverticulum - perforation.  TECHNIQUE:   Single view.   FINDINGS:  BOWEL GAS PATTERN: There is large amount of contrast in the ascending, transverse and proximal descending colon with the transverse colon contrast obscuring the region of the duodenal perforation, and the examination will be rescheduled for tomorrow once  the colon contrast has been evacuated. SOFT TISSUES: Normal.  No masses or organomegaly.  CALCIFICATIONS: None significant. BONES: Normal.  No significant arthritic changes.  OTHER: Negative.  No abnormal gaseous collections.          CONCLUSION:  1. There is a large amount of contrast in the ascending, transverse and proximal descending colon with the transverse colon contrast  obscuring the region of the duodenal perforation , and the examination will be rescheduled for tomorrow once the colon contrast has been evacuated.    Dictated by (CST): Paul Aranda MD on 11/04/2024 at 12:14 PM     Finalized by (CST): Paul Aranda MD on 11/04/2024 at 12:16 PM          XR UGI/ESOPHAGUS SINGLE CONTRAST (CPT=74240)    Result Date: 10/31/2024  PROCEDURE: XR UGI/ESOPHAGUS SINGLE CONTRAST (CPT=74240)  COMPARISON: [facility], CT ABDOMEN + PELVIS (CONTRAST ONLY) (CPT=74177), 10/29/2024, 3:22 PM.   INDICATIONS: Contained perforation of the descending duodenum on recent CT scan..  TECHNIQUE:   A single contrast esophagram and upper gastrointestinal series was performed with fluoroscopy in the usual manner.  The patient was inadvertently given a moderate amount of thin barium for this examination as opposed to water-soluble Gastrografin contrast.   FLUOROSCOPY IMAGES OBTAINED:  22 FLUOROSCOPY TIME:  2.6 minute RADIATION DOSE (Dose Area Product):  3830.1-uGy*m^2 FINDINGS:  ESOPHAGUS STRUCTURE:   Normal.  No visible obstruction, stricture or dilation.  MOTILITY:   Normal.  HERNIA:   None visible.  REFLUX:   None visible.  OTHER:   Negative.   UPPER GI STOMACH:   Poorly distended stomach because of lack of air contrast technique.  No well-defined mass or outlet obstruction. DUODENUM:   There is apparent extraluminal extravasation of contrast at the descending portion of the duodenum where there is a 2.3 x 2.4 cm collection of contrast which extends beyond the lumen of the duodenum, it is difficult to tell if this is all free contained extravasated contrast at a site of perforation or if there may be an underlying diverticulum with associated adjacent extravasation from a duodenal diverticulum perforation.  There is mild spasm poor distention of the descending portion of  the duodenal sweep.  There are 2 moderate-sized diverticula identified in transverse portion the duodenum with one  measuring 2.9 x 3.1 cm, and adjacent 1 measuring 1.2 x 1.4 cm.  There does not appear to be extravasation or perforation from the 2 adjacent diverticula of the duodenal sweep. OTHER:   Negative.              CONCLUSION:  1. There is apparent extraluminal extravasation of contrast at the descending portion of the duodenal sweep just distal to the duodenal bulb which appears to be contained and overall measures about 2.3 x 2.4 cm, and it is difficult to tell if this is all  extraluminal contained contrast or if there may be a diverticulum at this site with adjacent extravasation of contrast from a duodenal diverticular rupture.  There is no well-defined mass.  There is mild spasm and poor distention of the descending portion of the duodenal sweep.  Correlate clinically and with endoscopy.  There are two small to moderate adjacent diverticula noted of the transverse portion of the duodenal sweep without evidence of perforation or contrast extravasation. 2. Normal esophagus.  Normal appearance to the stomach which is poorly distended though.  No well-defined mass.    Dictated by (CST): Paul Aranda MD on 10/31/2024 at 1:24 PM     Finalized by (CST): Paul Aranda MD on 10/31/2024 at 1:38 PM          CT ABDOMEN+PELVIS(CONTRAST ONLY)(CPT=74177)    Result Date: 10/29/2024  PROCEDURE: CT ABDOMEN + PELVIS (CONTRAST ONLY) (CPT=74177)  COMPARISON: None.  INDICATIONS: Epigastric pain, nausea, and vomiting.  TECHNIQUE: CT images of the abdomen and pelvis were obtained with non-ionic intravenous contrast material.  Automated exposure control for dose reduction was used. Adjustment of the mA and/or kV was done based on the patient's size. Use of iterative reconstruction technique for dose reduction was used.  Dose information is transmitted to the ACR (American College of Radiology) NRDR (National Radiology Data Registry) which includes the Dose Index Registry.  FINDINGS:  LIVER: Too small to characterize hypoattenuating  hepatic dome lesion. BILIARY: No evidence for biliary dilatation. Post cholecystectomy. SPLEEN: Normal.  PANCREAS: Normal.  ADRENALS: Normal. KIDNEYS: Normal. AORTA/VASCULAR: Atherosclerotic vascular calcification including coronary artery calcification. No aneurysm or dissection.  LYMPHADENOPATHY: None. GI/MESENTERY: Multiple duodenal diverticula with a contained perforation of the 2nd portion of the duodenum with surrounding inflammatory changes including small para duodenal fluid collections measuring up to 1.6 x 3.6 cm Normal appendix.  Colonic diverticulosis.  No obstruction, bowel wall thickening, or mesenteric mass. ABDOMINAL WALL: Normal.  No mass or hernia.  URINARY BLADDER: Normal. ASCITES:   None. PELVIC ORGANS: No suspect pelvic mass. BONES: Mild-to-moderate chronic superior endplate compression fracture T8 and T10.  Minimal chronic anterior wedging of T9 and T11.  Schmorl's node associated with mild chronic superior endplate compression of L4.  No suspect bone lesion. LUNG BASES: Normal.  No visible pulmonary or pleural disease.  OTHER: Negative.          CONCLUSION:  1. Contained perforation 2nd portion of duodenum either related to perforated diverticulitis or ulcer.  Small para duodenal fluid collection measuring approximately 3 x 3.6 x 1.6 cm. 2. Multiple duodenal diverticula. 3. Moderate coronary artery calcification.  4. Findings were called to Dr. Lara.    Dictated by (CST): Dustin Hughes MD on 10/29/2024 at 3:46 PM     Finalized by (CST): Dustin Hughes MD on 10/29/2024 at 4:00 PM          XR CHEST AP PORTABLE  (CPT=71045)    Result Date: 10/29/2024  PROCEDURE: XR CHEST AP PORTABLE  (CPT=71045) TIME: 13:18.   COMPARISON: [facility], XR CHEST AP PORTABLE (CPT=71045), 6/20/2024, 1:27 PM.  [facility], XR CHEST PA + LAT CHEST (CPT=71046), 1/08/2024, 11:33 AM.  INDICATIONS: Epigastric pain, nausea, and vomitting x4 days. Diarrhea x1 day.  TECHNIQUE:   Single view.    FINDINGS:  CARDIAC/VASC: The cardiomediastinal silhouette is unchanged in size.  There is atherosclerotic calcification of the thoracic aorta. MEDIAST/JENNIFER:   No visible mass or adenopathy. LUNGS/PLEURA: Small bibasilar opacities.  No pleural effusion.  No pneumothorax. BONES: Multilevel degenerative changes of the thoracic spine.  The thoracic compression fracture deformities were better demonstrated the prior radiograph dated 01/08/2024. OTHER: Negative.          CONCLUSION:   Small bibasilar opacities, which may reflect atelectasis with or without superimposed pneumonia.     Dictated by (CST): Lalo King MD on 10/29/2024 at 1:33 PM     Finalized by (CST): Lalo King MD on 10/29/2024 at 1:35 PM            Assessment/Plan:  Patient Active Problem List   Diagnosis    Osteoporosis    Contusion of right chest wall    Perforated diverticulum of duodenum    Duodenal ulcer with perforation (HCC)    Perforated duodenal ulcer (HCC)    Small bowel diverticular disease   Tolerating    Discharge planning for tomorrow  DAVID TREVIÑO MD  11/13/2024  4:02 PM

## 2024-11-13 NOTE — PROGRESS NOTES
INFECTIOUS DISEASE PROGRESS NOTE  [facility]  part of [facility] ID PROGRESS NOTE    Dorys Pickens Patient Status:  Inpatient    1950 MRN [id number]   Location [facility] 4W/SW/SE Attending Arpit Hutchins MD   Hosp Day # 15 PCP Cristel Arias MD     Subjective:  ROS reviewed with language line interpretor. Feels well. Has been eating. Remains on TPN. States appetite is good.    ASSESSMENT:    Antibiotics: OFF  IV ceftriaxone, flagyl     # Duodenal diverticulitis with perforation and extraluminal fluid with fistula - abscess vs uninfected contents               - most recent CT A/P  improved w/o further fistula               - s/p EGD  with duodenal diverticulosis with perforation        PLAN:  -  Continue to monitor off abx.  -  Diet per GS.  -  Follow fever curve, wbc.  -  Reviewed labs, micro, imaging reports, available old records  -  Case d/w patient, RN.     History of Present Illness:  73-year-old polish-speaking female with a history of COPD, HTN who came to the hospital on 10/29 with acute onset of 1 day epigastric pain with 1 episode of emesis but no fevers.  No antibiotics prior to admission.  Was afebrile with initial WBC of 14.  CT A/P showed a contained perforation of the second portion of the duodenum concern for diverticulitis with a fluid collection as well as duodenal diverticula.  Started on ceftriaxone and Flagyl.  Seen by GI and underwent EGD on  with findings of duodenal diverticulosis with perforation.  Path negative for malignancy or H. pylori.  Started on TPN via PICC line.  Upper GI study  with extravasation of contrast noted in the duodenum.  Most recent CT done  with decreased size of the extraluminal fluid and no further fistula noted.     Physical Exam:  /71 (BP Location: Left arm)   Pulse 84   Temp 97.8 °F (36.6 °C) (Oral)   Resp 16   Ht 5' 1.42\" (1.56 m)   Wt 135 lb 6.4 oz (61.4 kg)   SpO2 98%   BMI  25.24 kg/m²     Gen:   Awake, in chair  HEENT:  EOMI, neck supple  CV/lungs:  RRR, CTAB  Abdom:  Soft, NT/ND, +BS  Skin/extrem:  No rashes, no c/c/e  Lines:  PICC+    Laboratory Data: Reviewed    Microbiology: Reviewed    Radiology: Reviewed      [facility]  [phone]  11/13/2024

## 2024-11-14 VITALS
DIASTOLIC BLOOD PRESSURE: 68 MMHG | WEIGHT: 135.38 LBS | SYSTOLIC BLOOD PRESSURE: 125 MMHG | OXYGEN SATURATION: 98 % | HEIGHT: 61.42 IN | RESPIRATION RATE: 18 BRPM | TEMPERATURE: 98 F | BODY MASS INDEX: 25.23 KG/M2 | HEART RATE: 87 BPM

## 2024-11-14 PROCEDURE — 99239 HOSP IP/OBS DSCHRG MGMT >30: CPT | Performed by: INTERNAL MEDICINE

## 2024-11-14 NOTE — PLAN OF CARE
Pt Aox4, on room air. Vital signs stable. TPN discontinued. R arm precautions maintained.   Problem: GASTROINTESTINAL - ADULT  Goal: Minimal or absence of nausea and vomiting  Description: INTERVENTIONS:  - Maintain adequate hydration with IV or PO as ordered and tolerated  - Nasogastric tube to low intermittent suction as ordered  - Evaluate effectiveness of ordered antiemetic medications  - Provide nonpharmacologic comfort measures as appropriate  - Advance diet as tolerated, if ordered  - Obtain nutritional consult as needed  - Evaluate fluid balance  11/14/2024 0102 by Daljit Lopez RN  Outcome: Progressing  11/14/2024 0054 by Daljit Lopez RN  Outcome: Progressing  Goal: Maintains or returns to baseline bowel function  Description: INTERVENTIONS:  - Assess bowel function  - Maintain adequate hydration with IV or PO as ordered and tolerated  - Evaluate effectiveness of GI medications  - Encourage mobilization and activity  - Obtain nutritional consult as needed  - Establish a toileting routine/schedule  - Consider collaborating with pharmacy to review patient's medication profile  11/14/2024 0102 by Daljit Lopez RN  Outcome: Progressing  11/14/2024 0054 by Daljit Lopez RN  Outcome: Progressing     Problem: PAIN - ADULT  Goal: Verbalizes/displays adequate comfort level or patient's stated pain goal  Description: INTERVENTIONS:  - Encourage pt to monitor pain and request assistance  - Assess pain using appropriate pain scale  - Administer analgesics based on type and severity of pain and evaluate response  - Implement non-pharmacological measures as appropriate and evaluate response  - Consider cultural and social influences on pain and pain management  - Manage/alleviate anxiety  - Utilize distraction and/or relaxation techniques  - Monitor for opioid side effects  - Notify MD/LIP if interventions unsuccessful or patient reports new pain  - Anticipate increased pain with activity and  pre-medicate as appropriate  11/14/2024 0102 by Daljit Lopez, RN  Outcome: Progressing  11/14/2024 0054 by Daljit Lopez, RN  Outcome: Progressing

## 2024-11-14 NOTE — PROGRESS NOTES
[facility]  part of [facility]    Progress Note    Dorys Pickens Patient Status:  Inpatient    1950 MRN [id number]   Location [facility] 4W/SW/SE Attending Arpit Hutchins MD   Hosp Day # 16 PCP Cristel Arias MD     Subjective:  Feels ok  tolerated po    Objective/Physical Exam:  General: Alert, orientated x3.  Cooperative.  No apparent distress.  Vital Signs:  Blood pressure 125/68, pulse 87, temperature 97.6 °F (36.4 °C), temperature source Oral, resp. rate 18, height 5' 1.42\" (1.56 m), weight 135 lb 6.4 oz (61.4 kg), SpO2 98%, not currently breastfeeding.    Labs:  Lab Results   Component Value Date    WBC 5.7 2024    HGB 12.7 2024    HCT 37.6 2024    .0 2024    CREATSERUM 0.52 (L) 2024    BUN 16 2024     2024    K 4.0 2024     2024    CO2 26.0 2024     (H) 2024    CA 9.1 2024    ALB 3.4 2024    ALKPHO 50 (L) 11/10/2024    BILT 0.2 11/10/2024    TP 6.0 11/10/2024    AST 16 11/10/2024    ALT 20 11/10/2024    T4F 1.5 2024    TSH 2.029 2024    LIP 29 10/29/2024    MG 2.0 2024    PHOS 4.0 2024       Imaging:  CT ABDOMEN+PELVIS(CONTRAST ONLY)(CPT=74177)    Result Date: 2024  PROCEDURE: CT ABDOMEN + PELVIS (CONTRAST ONLY) (CPT=74177)  COMPARISON: [facility], CT ABDOMEN + PELVIS (CONTRAST ONLY) (CPT=74177), 2024, 7:27 AM.  INDICATIONS: duodenal diverticuli leak  TECHNIQUE: CT images of the abdomen and pelvis were obtained with non-ionic intravenous contrast material.  Automated exposure control for dose reduction was used. Adjustment of the mA and/or kV was done based on the patient's size. Use of iterative reconstruction technique for dose reduction was used.  Dose information is transmitted to the ACR (American College of Radiology) NRDR (National Radiology Data Registry) which includes the Dose Index Registry.  FINDINGS:    The following findings remain:  1. There has been slight interval decrease in size of extraluminal collection medial to the duodenum compatible with abscess from perforated duodenal diverticulitis.  On previous study this measured 6.1 x 2.4 x 3.6 cm. In the same dimensions on current study this collection measures 4.1 x 1.9 x 3.6 cm.  In addition, there is no oral contrast clearly identified within this collection to suggest persistent communication.  There has also been near total resolution of the previously seen surrounding inflammation.  There are no new acute appearing abnormality is within the abdomen.  2. The liver demonstrates slight decreased attenuation compatible with minimal fatty infiltration.  There are no focal hepatic lesions clearly identified.  3. The gallbladder is surgically absent.  There is no common or intrahepatic biliary ductal dilatation.  4. There are moderate atherosclerotic calcifications within a nonaneurysmal abdominal aorta and iliac arterial system.  5.  There is a focal lumbar scoliosis with apex at the L3-L4 level.  The finding is associated with are moderate to severe degenerative changes at the L3-L4 level.  There are moderate degenerative changes throughout the remainder of the lumbar There is mild central compression of the superior aspects of the TA and T10 vertebral bodies.  There is no acute fracture identified.  Spine.  The remainder of the examination is unremarkable.  Specifically, the lung bases are clear.  The visualized aspects of the spleen, pancreas, adrenals, kidneys, stomach, small bowel, colon, and remaining soft tissues demonstrated grossly normal CT appearance for a patient of this age.  There is no free fluid or organized fluid collection.  There is no significant adenopathy.         CONCLUSION: There has been slight interval decrease in size of extraluminal collection medial to the duodenum compatible with abscess from perforated duodenal diverticulitis.  On  previous study this measured 6.1 x 2.4 x 3.6 cm. In the same dimensions on current study this collection measures 4.1 x 1.9 x 3.6 cm.  In addition, there is no oral contrast clearly identified within this collection to suggest persistent communication.  There has also been near total resolution of the previously seen surrounding inflammation.  There are no new acute appearing abnormality is within the abdomen.  Stable nonacute findings are present and are described within the body of the report.   Dictated by (CST): Olayinka Lam MD on 11/11/2024 at 2:05 PM     Finalized by (CST): Olayinka Lam MD on 11/11/2024 at 2:17 PM          CT ABDOMEN+PELVIS(CONTRAST ONLY)(CPT=74177)    Result Date: 11/8/2024  PROCEDURE: CT ABDOMEN + PELVIS (CONTRAST ONLY) (CPT=74177)  COMPARISON: [facility], XR UGI/ESOPHAGUS SINGLE CONTRAST (CPT=74240), 11/06/2024, 11:47 AM.  [facility], XR UGI/ESOPHAGUS SINGLE CONTRAST (CPT=74240), 10/31/2024, 10:05 AM.  [facility], CT ABDOMEN + PELVIS (CONTRAST ONLY) (CPT=74177), 10/29/2024, 3:22 PM.  INDICATIONS: Duodenal diverticulum leak  TECHNIQUE: CT images of the abdomen and pelvis were obtained with non-ionic intravenous contrast material.  Automated exposure control for dose reduction was used. Adjustment of the mA and/or kV was done based on the patient's size. Use of iterative reconstruction technique for dose reduction was used.  Dose information is transmitted to the ACR (American College of Radiology) NRDR (National Radiology Data Registry) which includes the Dose Index Registry.  FINDINGS:  LIVER: Diffuse low attenuation seen throughout the liver compatible with fatty infiltration. GALLBLADDER: The patient is post cholecystectomy. BILIARY: Mild prominence of the common bile duct and intrahepatic ducts, an expected finding post cholecystectomy. No gross intra-or extrahepatic biliary ductal dilation. SPLEEN: Unremarkable PANCREAS: The  pancreas enhances symmetrically. No ductal dilation. ADRENALS: Unremarkable KIDNEYS: The kidneys enhance symmetrically. There is no hydronephrosis.  AORTA/VASCULAR:   There is atherosclerotic calcification of the abdominal aorta extending into bilateral iliac arteries. RETROPERITONEUM: No mass or enlarged adenopathy.  BOWEL:  Again visualized is a large diverticulum seen at the level of the 2nd portion of the duodenum.  This measures approximately 2.9 x 3.6 by 4.9 cm.  Previously visualized extraluminal pocket of gas and fluid has decreased in size measuring approximately 2.6 x 2.1 cm.  There previously visualized inflammation has improved but not entirely resolved.  No new collection. There is diverticulosis involving the distal descending colon and sigmoid colon without evidence of diverticulitis. The appendix is normal. There are no inflammatory changes within the right lower quadrant.  Remainder of the bowel is otherwise unremarkable without dilation or wall thickening. MESENTERY: Normal.  No mass or hernia.   PELVIS: No enlarged mass or adenopathy. No bladder wall thickening. BONES:   There is degenerative disease of the thoracic and lumbar spine. Degenerative changes are seen within the sacroiliac joints and pubic symphysis. Degenerative changes are seen in the bilateral hips. LUNG BASES: There is bibasilar and lingular atelectasis and scarring. There are coronary artery calcifications.         CONCLUSION:   Perforated duodenal diverticulitis has improved but not resolved since 10/29/2024.  2.6 cm area of contained perforation has decreased in size but not resolved.  Inflammation within the right upper quadrant has improved but not entirely resolved.  No new collection or drainable abscess.     Dictated by (CST): Jose Guerrero MD on 11/08/2024 at 8:49 AM     Finalized by (CST): Jose Guerrero MD on 11/08/2024 at 9:03 AM          XR UGI/ESOPHAGUS SINGLE CONTRAST (CPT=74240)    Result Date: 11/6/2024  PROCEDURE: XR  UGI/ESOPHAGUS SINGLE CONTRAST (CPT=74240)  COMPARISON: [facility], XR UGI/ESOPHAGUS SINGLE CONTRAST (CPT=74240), 10/31/2024, 10:05 AM.   INDICATIONS: Duodenal perforation.  Follow-up for contained extravasation of contrast at the descending duodenal sweep seen on previous study of 10/31/2024.  TECHNIQUE:   A single contrast esophagram and upper gastrointestinal series was performed with fluoroscopy in the usual manner.   FLUOROSCOPY IMAGES OBTAINED:  19 FLUOROSCOPY TIME:  2.5 minute RADIATION DOSE (Dose Area Product):  3519.8-uGy*m^2    FINDINGS:    The preliminary  film of the abdomen still demonstrates residual contrast in the ascending, transverse and descending colon which is slightly less than that seen previously .  ESOPHAGUS STRUCTURE:   Normal.  No visible obstruction, stricture or dilation.  MOTILITY:   Normal.  HERNIA:   None visible.  REFLUX:   None visible.  OTHER:   Negative.   UPPER GI STOMACH:   Normal.  No obstruction, mass or ulceration.  DUODENUM:   Persistent localized contrast extravasation at the medial aspect of the descending portion of the duodenal sweep which is about the same 2 perhaps slightly larger than that noted on the original study of 10/31/2024, and overall measures maximally about 5.1 x 3.8 cm and again suggests a contained site of perforation.  Possibility of this representing a large atypical diverticulum cannot entirely be excluded.  Again noted is a stable diverticulum of the transverse portion the duodenal sweep measuring 3.4 x 3.3 cm. OTHER:   Negative.              CONCLUSION:  1. Normal esophagus and stomach. 2. Persistent localized contrast extravasation at the medial aspect of the descending portion of the duodenal sweep which is about the same to perhaps slightly larger than that seen on the original study of 10/31/2024 as described above.  The possibility  of this representing a large atypical diverticulum cannot entirely be excluded.  See above.     Dictated by (CST): Paul Aranda MD on 11/06/2024 at 1:09 PM     Finalized by (CST): Palu Aranda MD on 11/06/2024 at 1:16 PM          XR ABDOMEN (1 VIEW) (CPT=74018)    Result Date: 11/5/2024  PROCEDURE: XR ABDOMEN (1 VIEW) (CPT=74018)  COMPARISON: [facility], XR ABDOMEN (1 VIEW) (CPT=74018), 11/04/2024, 9:51 AM.  INDICATIONS: Duodenal Perforation.  TECHNIQUE:   Single view.   FINDINGS:  BOWEL GAS PATTERN: There is still a moderate amount of contrast throughout the ascending, transverse and descending colon which would obscure the site of extravasation of contrast from the known duodenal diverticulum.  This will be attempted tomorrow once the contrast hopefully clears from the colon.  No bowel obstruction. SOFT TISSUES: Normal.  No masses or organomegaly.  CALCIFICATIONS: None significant. BONES: Normal.  No significant arthritic changes.  OTHER: Negative.  No abnormal gaseous collections.          CONCLUSION:  1. Persistent moderate contrast in the colon obscuring the region of the duodenal diverticulum extravasation.  The examination will be attempted again tomorrow.    Dictated by (CST): Paul Aranda MD on 11/05/2024 at 1:29 PM     Finalized by (CST): Paul Aranda MD on 11/05/2024 at 1:30 PM          XR ABDOMEN (1 VIEW) (CPT=74018)    Result Date: 11/4/2024  PROCEDURE: XR ABDOMEN (1 VIEW) (CPT=74018)  COMPARISON: None.  INDICATIONS: Duodenal diverticulum - perforation.  TECHNIQUE:   Single view.   FINDINGS:  BOWEL GAS PATTERN: There is large amount of contrast in the ascending, transverse and proximal descending colon with the transverse colon contrast obscuring the region of the duodenal perforation, and the examination will be rescheduled for tomorrow once  the colon contrast has been evacuated. SOFT TISSUES: Normal.  No masses or organomegaly.  CALCIFICATIONS: None significant. BONES: Normal.  No significant arthritic changes.  OTHER: Negative.  No abnormal gaseous collections.           CONCLUSION:  1. There is a large amount of contrast in the ascending, transverse and proximal descending colon with the transverse colon contrast obscuring the region of the duodenal perforation , and the examination will be rescheduled for tomorrow once the colon contrast has been evacuated.    Dictated by (CST): Paul Aranda MD on 11/04/2024 at 12:14 PM     Finalized by (CST): Paul Aranda MD on 11/04/2024 at 12:16 PM          XR UGI/ESOPHAGUS SINGLE CONTRAST (CPT=74240)    Result Date: 10/31/2024  PROCEDURE: XR UGI/ESOPHAGUS SINGLE CONTRAST (CPT=74240)  COMPARISON: [facility], CT ABDOMEN + PELVIS (CONTRAST ONLY) (CPT=74177), 10/29/2024, 3:22 PM.   INDICATIONS: Contained perforation of the descending duodenum on recent CT scan..  TECHNIQUE:   A single contrast esophagram and upper gastrointestinal series was performed with fluoroscopy in the usual manner.  The patient was inadvertently given a moderate amount of thin barium for this examination as opposed to water-soluble Gastrografin contrast.   FLUOROSCOPY IMAGES OBTAINED:  22 FLUOROSCOPY TIME:  2.6 minute RADIATION DOSE (Dose Area Product):  3830.1-uGy*m^2 FINDINGS:  ESOPHAGUS STRUCTURE:   Normal.  No visible obstruction, stricture or dilation.  MOTILITY:   Normal.  HERNIA:   None visible.  REFLUX:   None visible.  OTHER:   Negative.   UPPER GI STOMACH:   Poorly distended stomach because of lack of air contrast technique.  No well-defined mass or outlet obstruction. DUODENUM:   There is apparent extraluminal extravasation of contrast at the descending portion of the duodenum where there is a 2.3 x 2.4 cm collection of contrast which extends beyond the lumen of the duodenum, it is difficult to tell if this is all free contained extravasated contrast at a site of perforation or if there may be an underlying diverticulum with associated adjacent extravasation from a duodenal diverticulum perforation.  There is mild spasm poor distention of  the descending portion of  the duodenal sweep.  There are 2 moderate-sized diverticula identified in transverse portion the duodenum with one measuring 2.9 x 3.1 cm, and adjacent 1 measuring 1.2 x 1.4 cm.  There does not appear to be extravasation or perforation from the 2 adjacent diverticula of the duodenal sweep. OTHER:   Negative.              CONCLUSION:  1. There is apparent extraluminal extravasation of contrast at the descending portion of the duodenal sweep just distal to the duodenal bulb which appears to be contained and overall measures about 2.3 x 2.4 cm, and it is difficult to tell if this is all  extraluminal contained contrast or if there may be a diverticulum at this site with adjacent extravasation of contrast from a duodenal diverticular rupture.  There is no well-defined mass.  There is mild spasm and poor distention of the descending portion of the duodenal sweep.  Correlate clinically and with endoscopy.  There are two small to moderate adjacent diverticula noted of the transverse portion of the duodenal sweep without evidence of perforation or contrast extravasation. 2. Normal esophagus.  Normal appearance to the stomach which is poorly distended though.  No well-defined mass.    Dictated by (CST): Paul Aranda MD on 10/31/2024 at 1:24 PM     Finalized by (CST): Paul Aranda MD on 10/31/2024 at 1:38 PM          CT ABDOMEN+PELVIS(CONTRAST ONLY)(CPT=74177)    Result Date: 10/29/2024  PROCEDURE: CT ABDOMEN + PELVIS (CONTRAST ONLY) (CPT=74177)  COMPARISON: None.  INDICATIONS: Epigastric pain, nausea, and vomiting.  TECHNIQUE: CT images of the abdomen and pelvis were obtained with non-ionic intravenous contrast material.  Automated exposure control for dose reduction was used. Adjustment of the mA and/or kV was done based on the patient's size. Use of iterative reconstruction technique for dose reduction was used.  Dose information is transmitted to the ACR (American College of Radiology) NRDR  (National Radiology Data Registry) which includes the Dose Index Registry.  FINDINGS:  LIVER: Too small to characterize hypoattenuating hepatic dome lesion. BILIARY: No evidence for biliary dilatation. Post cholecystectomy. SPLEEN: Normal.  PANCREAS: Normal.  ADRENALS: Normal. KIDNEYS: Normal. AORTA/VASCULAR: Atherosclerotic vascular calcification including coronary artery calcification. No aneurysm or dissection.  LYMPHADENOPATHY: None. GI/MESENTERY: Multiple duodenal diverticula with a contained perforation of the 2nd portion of the duodenum with surrounding inflammatory changes including small para duodenal fluid collections measuring up to 1.6 x 3.6 cm Normal appendix.  Colonic diverticulosis.  No obstruction, bowel wall thickening, or mesenteric mass. ABDOMINAL WALL: Normal.  No mass or hernia.  URINARY BLADDER: Normal. ASCITES:   None. PELVIC ORGANS: No suspect pelvic mass. BONES: Mild-to-moderate chronic superior endplate compression fracture T8 and T10.  Minimal chronic anterior wedging of T9 and T11.  Schmorl's node associated with mild chronic superior endplate compression of L4.  No suspect bone lesion. LUNG BASES: Normal.  No visible pulmonary or pleural disease.  OTHER: Negative.          CONCLUSION:  1. Contained perforation 2nd portion of duodenum either related to perforated diverticulitis or ulcer.  Small para duodenal fluid collection measuring approximately 3 x 3.6 x 1.6 cm. 2. Multiple duodenal diverticula. 3. Moderate coronary artery calcification.  4. Findings were called to Dr. Lara.    Dictated by (CST): Dustin Hughes MD on 10/29/2024 at 3:46 PM     Finalized by (CST): Dustin Hughes MD on 10/29/2024 at 4:00 PM          XR CHEST AP PORTABLE  (CPT=71045)    Result Date: 10/29/2024  PROCEDURE: XR CHEST AP PORTABLE  (CPT=71045) TIME: 13:18.   COMPARISON: [facility], XR CHEST AP PORTABLE (CPT=71045), 6/20/2024, 1:27 PM.  [facility], XR CHEST PA + LAT CHEST  (CPT=71046), 1/08/2024, 11:33 AM.  INDICATIONS: Epigastric pain, nausea, and vomitting x4 days. Diarrhea x1 day.  TECHNIQUE:   Single view.   FINDINGS:  CARDIAC/VASC: The cardiomediastinal silhouette is unchanged in size.  There is atherosclerotic calcification of the thoracic aorta. MEDIAST/JENNIFER:   No visible mass or adenopathy. LUNGS/PLEURA: Small bibasilar opacities.  No pleural effusion.  No pneumothorax. BONES: Multilevel degenerative changes of the thoracic spine.  The thoracic compression fracture deformities were better demonstrated the prior radiograph dated 01/08/2024. OTHER: Negative.          CONCLUSION:   Small bibasilar opacities, which may reflect atelectasis with or without superimposed pneumonia.     Dictated by (CST): Lalo King MD on 10/29/2024 at 1:33 PM     Finalized by (CST): Lalo King MD on 10/29/2024 at 1:35 PM            Assessment/Plan:  Patient Active Problem List   Diagnosis    Osteoporosis    Contusion of right chest wall    Perforated diverticulum of duodenum    Duodenal ulcer with perforation (HCC)    Perforated duodenal ulcer (HCC)    Small bowel diverticular disease   Tolerated po  Ok for discharge  To see one week  Instructions given     DAVID TREVIÑO MD  11/14/2024  2:10 PM

## 2024-11-14 NOTE — PLAN OF CARE
A&Ox4, tolerating small amounts per meal, TPN to discontinue tonight, up self care, ambulating in hallway/chair, voiding freely, bm x1 today.  No complaints of pain or nausea.   Plan to discharge home when medically stable.  Patient updated care plan.    Problem: Patient Centered Care  Goal: Patient preferences are identified and integrated in the patient's plan of care  Description: Interventions:  - What would you like us to know as we care for you? I am from home and speak [place].   - Provide timely, complete, and accurate information to patient/family  - Incorporate patient and family knowledge, values, beliefs, and cultural backgrounds into the planning and delivery of care  - Encourage patient/family to participate in care and decision-making at the level they choose  - Honor patient and family perspectives and choices  Outcome: Progressing     Problem: Patient/Family Goals  Goal: Patient/Family Long Term Goal  Description: Patient's Long Term Goal: Discharge home     Interventions:  - Monitor vitals  - Monitor labs  - IV abx  - TPN  - NPO  - Gen sx on consult  - Daily weight  - See additional Care Plan goals for specific interventions  Outcome: Progressing  Goal: Patient/Family Short Term Goal  Description: Patient's Short Term Goal: Manage pain     Interventions:   - Frequent pain assessments  - Non-pharmacological interventions  - Pain medications as needed/indicated  - See additional Care Plan goals for specific interventions  Outcome: Progressing     Problem: GASTROINTESTINAL - ADULT  Goal: Minimal or absence of nausea and vomiting  Description: INTERVENTIONS:  - Maintain adequate hydration with IV or PO as ordered and tolerated  - Nasogastric tube to low intermittent suction as ordered  - Evaluate effectiveness of ordered antiemetic medications  - Provide nonpharmacologic comfort measures as appropriate  - Advance diet as tolerated, if ordered  - Obtain nutritional consult as needed  - Evaluate fluid  balance  Outcome: Progressing  Goal: Maintains or returns to baseline bowel function  Description: INTERVENTIONS:  - Assess bowel function  - Maintain adequate hydration with IV or PO as ordered and tolerated  - Evaluate effectiveness of GI medications  - Encourage mobilization and activity  - Obtain nutritional consult as needed  - Establish a toileting routine/schedule  - Consider collaborating with pharmacy to review patient's medication profile  Outcome: Progressing     Problem: PAIN - ADULT  Goal: Verbalizes/displays adequate comfort level or patient's stated pain goal  Description: INTERVENTIONS:  - Encourage pt to monitor pain and request assistance  - Assess pain using appropriate pain scale  - Administer analgesics based on type and severity of pain and evaluate response  - Implement non-pharmacological measures as appropriate and evaluate response  - Consider cultural and social influences on pain and pain management  - Manage/alleviate anxiety  - Utilize distraction and/or relaxation techniques  - Monitor for opioid side effects  - Notify MD/LIP if interventions unsuccessful or patient reports new pain  - Anticipate increased pain with activity and pre-medicate as appropriate  Outcome: Progressing     Problem: RISK FOR INFECTION - ADULT  Goal: Absence of fever/infection during anticipated neutropenic period  Description: INTERVENTIONS  - Monitor WBC  - Administer growth factors as ordered  - Implement neutropenic guidelines  Outcome: Progressing     Problem: SAFETY ADULT - FALL  Goal: Free from fall injury  Description: INTERVENTIONS:  - Assess pt frequently for physical needs  - Identify cognitive and physical deficits and behaviors that affect risk of falls.  - [place] fall precautions as indicated by assessment.  - Educate pt/family on patient safety including physical limitations  - Instruct pt to call for assistance with activity based on assessment  - Modify environment to reduce risk of injury  -  Provide assistive devices as appropriate  - Consider OT/PT consult to assist with strengthening/mobility  - Encourage toileting schedule  Outcome: Progressing     Problem: DISCHARGE PLANNING  Goal: Discharge to home or other facility with appropriate resources  Description: INTERVENTIONS:  - Identify barriers to discharge w/pt and caregiver  - Include patient/family/discharge partner in discharge planning  - Arrange for needed discharge resources and transportation as appropriate  - Identify discharge learning needs (meds, wound care, etc)  - Arrange for interpreters to assist at discharge as needed  - Consider post-discharge preferences of patient/family/discharge partner  - Complete POLST form as appropriate  - Assess patient's ability to be responsible for managing their own health  - Refer to Case Management Department for coordinating discharge planning if the patient needs post-hospital services based on physician/LIP order or complex needs related to functional status, cognitive ability or social support system  Outcome: Progressing     Problem: Altered Communication/Language Barrier  Goal: Patient/Family is able to understand and participate in their care  Description: Interventions:  - Assess communication ability and preferred communication style  - Implement communication aides and strategies  - Use visual cues when possible  - Listen attentively, be patient, do not interrupt  - Minimize distractions  - Allow time for understanding and response  - Establish method for patient to ask for assistance (call light)  - Provide an  as needed  - Communicate barriers and strategies to overcome with those who interact with patient  Outcome: Progressing

## 2024-11-14 NOTE — DISCHARGE SUMMARY
[facility]  part of [facility]    DISCHARGE SUMMARY     Dorys Pickens Patient Status:  Inpatient    1950 MRN [id number]   Location [facility] 4W/SW/SE Attending Arpit Hutchins MD   Hosp Day # 16 PCP Cristel Arias MD     Date of Admission: 10/29/2024  Date of Discharge:  2024    Discharge Disposition: [facility] Emergency Room    Discharge Diagnosis:     Acute duodenal perforation  ?PUD  Hypothyroidism  HLD    History of Present Illness:     Patient is a 73-year-old  female who presented to the emergency department for evaluation after she was seen at the urgent care center for epigastric abdominal pain earlier today. Because of her persistent symptoms she was sent to the emergency department for evaluation. CBC showed white blood cell count of 14.4 with left shift. Lipase and troponin were negative. Chemistry was unremarkable. Lactic acid and blood cultures were obtained. Chest x-ray showed no acute findings. CT scan of the abdomen showed contained perforation of the second portion of duodenum either related to perforated diverticulitis or ulcer, small area of paraduodenal fluid collection measuring 3 x 3.6 x 1.6 cm. Patient was started on IV Rocephin and Flagyl, IV Protonix, and she will be admitted to the hospital for further management.     Brief Synopsis:     Acute duodenal perforation  ?PUD  Imaging reviewed  Paraduodenal fluid collection and possible abscess  Contrast study showing extraluminal extravastation of the contrast -> on CT of  no extravasation seen  GI on consult  Underwent EGD, findings reviewed  UGI with gastrograffin also complete  Continue PPI BID and IV abx  CT scan  showing improvement in contained perforation  Tolerating sips  CT scan of  showing further improvement no extravasation  7.    Given abscess form diverticular perforation, will consult ID for abx recs on dc   8.    FLD and ADAT  Gsx on consult  Wean TPN, ADAT  Repeat  studies as above  Okay to discharge home   ID on consult  Monitor off abx at this time  PRN pain control  Hypothyroidism  HLD  Cont home meds    Patient is to follow up with PCP, GI, ID and Gsx as opt for further care. Discharge meds ordered per surgery, ID and GI recs.   Hold home antihypertensives as the patient has not required them throughout her hospital stay and her BP is low/normal. She is to maintain a BP diary and follow up with her PCP for further care.   Patient is to remain compliant with all discharge medications and instructions and to follow up as advised.   Patient encouraged to make healthy lifestyle and dietary changes.    Lace+ Score: 50  59-90 High Risk  29-58 Medium Risk  0-28   Low Risk       TCM Follow-Up Recommendation:  LACE 29-58: Moderate Risk of readmission after discharge from the hospital.      Procedures during hospitalization:   EGD    Incidental or significant findings and recommendations (brief descriptions):  None    Lab/Test results pending at Discharge:   None    Consultants:  GI  ID  GSx    Discharge Medication List:     Discharge Medications        CHANGE how you take these medications        Instructions Prescription details   albuterol 108 (90 Base) MCG/ACT Aers  Commonly known as: Ventolin HFA  What changed: Another medication with the same name was removed. Continue taking this medication, and follow the directions you see here.      Inhale 2 puffs into the lungs every 4 (four) hours as needed for Wheezing.   Quantity: 18 g  Refills: 3     alendronate 70 MG Tabs  Commonly known as: Fosamax  What changed: how much to take      TAKE 1 TABLET BY MOUTH EVERY WEEK   Quantity: 12 tablet  Refills: 3     fluticasone propionate 50 MCG/ACT Susp  Commonly known as: Flonase  What changed: Another medication with the same name was removed. Continue taking this medication, and follow the directions you see here.      2 sprays by Nasal route daily.   Quantity: 16 g  Refills: 0             CONTINUE taking these medications        Instructions Prescription details   clotrimazole-betamethasone 1-0.05 % Crea  Commonly known as: Lotrisone      Topically bid   Quantity: 60 g  Refills: 3     ergocalciferol 1.25 MG (77577 UT) Caps  Commonly known as: Vitamin D2      Take 1 capsule (50,000 Units total) by mouth once a week.   Quantity: 12 capsule  Refills: 3     gabapentin 100 MG Caps  Commonly known as: Neurontin      Take 1 capsule (100 mg total) by mouth nightly.   Quantity: 30 capsule  Refills: 3     levothyroxine 88 MCG Tabs  Commonly known as: Synthroid      Take 1 tablet (88 mcg total) by mouth before breakfast.   Quantity: 90 tablet  Refills: 3     nystatin-triamcinolone 100,000-0.1 Units/g-% Crea  Commonly known as: Mycolog II      Topically bid   Quantity: 1 Tube  Refills: 3     pantoprazole 40 MG Tbec  Commonly known as: Protonix      Take 1 tablet (40 mg total) by mouth every morning before breakfast.   Quantity: 90 tablet  Refills: 3     simvastatin 10 MG Tabs  Commonly known as: Zocor      Take 1 tablet (10 mg total) by mouth nightly.   Quantity: 90 tablet  Refills: 3     zolpidem 10 MG Tabs  Commonly known as: Ambien      Take 1 tablet (10 mg total) by mouth nightly as needed for Sleep.   Quantity: 30 tablet  Refills: 3            STOP taking these medications      aspirin 81 MG Tbec  Commonly known as: Aspirin Low Dose        benzonatate 200 MG Caps  Commonly known as: Tessalon        cyclobenzaprine 10 MG Tabs  Commonly known as: Flexeril        diclofenac 50 MG Tbec  Commonly known as: Voltaren        Diclofenac Sodium 1 % Gel  Commonly known as: Voltaren        doxycycline 100 MG Caps  Commonly known as: Vibramycin        fluconazole 100 MG Tabs  Commonly known as: Diflucan        Fungoid Tincture 2 % Soln  Generic drug: Miconazole Nitrate        furosemide 40 MG Tabs  Commonly known as: Lasix        HYDROcodone-acetaminophen 5-325 MG Tabs  Commonly known as: Norco        ibuprofen 400  MG Tabs  Commonly known as: Motrin        ibuprofen 600 MG Tabs  Commonly known as: Motrin        levocetirizine 5 MG Tabs  Commonly known as: Xyzal        LORazepam 0.5 MG Tabs  Commonly known as: Ativan        predniSONE 10 MG Tabs  Commonly known as: Deltasone        propranolol 20 MG Tabs  Commonly known as: Inderal        traMADol 50 MG Tabs  Commonly known as: Ultram        traZODone 100 MG Tabs  Commonly known as: Desyrel        triamcinolone 0.1 % Crea  Commonly known as: Kenalog                 [place]    Follow-up appointment:   Cristel Arias MD  [address] [facility] [address]  [phone]    Follow up in 1 week(s)      Otto Parikh MD  [address]  [place] [address]  [phone]    Follow up in 1 week(s)      Isma Alvarez MD  [address]  [facility] [address]  [phone]    Follow up in 1 week(s)      Jazz Sanches MD  [address] [facility] [address]  [phone]    Follow up in 1 week(s)      Appointments for Next 30 Days 11/14/2024 - 12/14/2024        Date Arrival Time Visit Type Length Department Provider     11/21/2024  3:00 PM  PHYSICAL - ESTABLISHED PATIENT [[id number]] 30 min [facility] Cristel Arias MD    Patient Instructions:         Location Instructions:     Masks are optional for all patients and visitors, unless otherwise indicated.                      Vital signs:  Temp:  [97.6 °F (36.4 °C)-97.9 °F (36.6 °C)] 97.6 °F (36.4 °C)  Pulse:  [79-87] 79  Resp:  [16-17] 17  BP: (102-124)/(55-70) 102/55  SpO2:  [97 %] 97 %    Physical Exam:    Gen: NAD AO x3  Chest: good air entry CTABL  CVS: normal s1 and s2 RR  Abd: NABS soft NT ND  Neuro: CN 2-12 grossly intact  Ext: no edema in bilateral LE    -----------------------------------------------------------------------------------------------  PATIENT DISCHARGE INSTRUCTIONS: See electronic chart    Arpit Hutchins MD  Hospitalist    Time  spent:  > 30 minutes    The 21st Century Cures Act makes medical notes like these available to patients in the interest of transparency. Please be advised this is a medical document. Medical documents are intended to carry relevant information, facts as evident, and the clinical opinion of the practitioner. The medical note is intended as peer to peer communication and may appear blunt or direct. It is written in medical language and may contain abbreviations or verbiage that are unfamiliar.

## 2024-11-14 NOTE — PLAN OF CARE
Problem: GASTROINTESTINAL - ADULT  Goal: Minimal or absence of nausea and vomiting  Description: INTERVENTIONS:  - Maintain adequate hydration with IV or PO as ordered and tolerated  - Nasogastric tube to low intermittent suction as ordered  - Evaluate effectiveness of ordered antiemetic medications  - Provide nonpharmacologic comfort measures as appropriate  - Advance diet as tolerated, if ordered  - Obtain nutritional consult as needed  - Evaluate fluid balance  Outcome: Progressing  Goal: Maintains or returns to baseline bowel function  Description: INTERVENTIONS:  - Assess bowel function  - Maintain adequate hydration with IV or PO as ordered and tolerated  - Evaluate effectiveness of GI medications  - Encourage mobilization and activity  - Obtain nutritional consult as needed  - Establish a toileting routine/schedule  - Consider collaborating with pharmacy to review patient's medication profile  Outcome: Progressing     Problem: PAIN - ADULT  Goal: Verbalizes/displays adequate comfort level or patient's stated pain goal  Description: INTERVENTIONS:  - Encourage pt to monitor pain and request assistance  - Assess pain using appropriate pain scale  - Administer analgesics based on type and severity of pain and evaluate response  - Implement non-pharmacological measures as appropriate and evaluate response  - Consider cultural and social influences on pain and pain management  - Manage/alleviate anxiety  - Utilize distraction and/or relaxation techniques  - Monitor for opioid side effects  - Notify MD/LIP if interventions unsuccessful or patient reports new pain  - Anticipate increased pain with activity and pre-medicate as appropriate  Outcome: Progressing

## 2024-11-15 ENCOUNTER — TELEPHONE (OUTPATIENT)
Dept: INTERNAL MEDICINE CLINIC | Facility: CLINIC | Age: 74
End: 2024-11-15

## 2024-11-15 ENCOUNTER — PATIENT OUTREACH (OUTPATIENT)
Dept: CASE MANAGEMENT | Age: 74
End: 2024-11-15

## 2024-11-15 NOTE — PROGRESS NOTES
Attempted to contact pt for condition update however no answer. Call continued to ring and did not go to a . [facility] to try again at a later time.

## 2024-11-15 NOTE — TELEPHONE ENCOUNTER
Attempted to contact patient for condition update today however no answer.  Patient has an appt with Dr. Arias on 11/21/24 however it is for a Physical.     HFU appointment recommended by 11/28/24 as patient is a Moderate risk for readmission.  Please advise.      Clinical staff:  Please follow-up with PCP to see if the appt visit type should be changed to NON- TCM HFU (9140) or stay as scheduled as patient would greatly benefit from HFU appointment.  Thank you!

## 2024-11-15 NOTE — PAYOR COMM NOTE
--------------  DISCHARGE REVIEW    Payor: [facility]  Subscriber #:  [id number]  Authorization Number: [id number]    Admit date: 10/29/24  Admit time:   5:21 PM  Discharge Date: 2024  6:56 PM     Admitting Physician: Lucia Tavarez MD  Attending Physician:  No att. providers found  Primary Care Physician: Cristel Arias MD          Discharge Summary Notes        Discharge Summary signed by Arpit Hutchins MD at 2024 12:12 PM       Author: Arpit Hutchins MD Specialty: HOSPITALIST, Internal Medicine Author Type: Physician    Filed: 2024 12:12 PM Date of Service: 2024 11:54 AM Status: Addendum    : Arpit Hutchins MD (Physician)    Related Notes: Original Note by Arpit Hutchins MD (Physician) filed at 2024 11:57 AM           [facility]  part of [facility]    DISCHARGE SUMMARY     Dorys Pickens Patient Status:  Inpatient    1950 MRN [id number]   Location [facility] 4W/SW/SE Attending Arpit Hutchins MD    Day # 16 PCP Cristel Arias MD     Date of Admission: 10/29/2024  Date of Discharge:  2024    Discharge Disposition: [facility] Emergency Room    Discharge Diagnosis:     Acute duodenal perforation  ?PUD  Hypothyroidism  HLD    History of Present Illness:     Patient is a 73-year-old  female who presented to the emergency department for evaluation after she was seen at the urgent care center for epigastric abdominal pain earlier today. Because of her persistent symptoms she was sent to the emergency department for evaluation. CBC showed white blood cell count of 14.4 with left shift. Lipase and troponin were negative. Chemistry was unremarkable. Lactic acid and blood cultures were obtained. Chest x-ray showed no acute findings. CT scan of the abdomen showed contained perforation of the second portion of duodenum either related to perforated diverticulitis or ulcer, small area of paraduodenal fluid  collection measuring 3 x 3.6 x 1.6 cm. Patient was started on IV Rocephin and Flagyl, IV Protonix, and she will be admitted to the hospital for further management.     Brief Synopsis:     Acute duodenal perforation  ?PUD  Imaging reviewed  Paraduodenal fluid collection and possible abscess  Contrast study showing extraluminal extravastation of the contrast -> on CT of 11/11 no extravasation seen  GI on consult  Underwent EGD, findings reviewed  UGI with gastrograffin also complete  Continue PPI BID and IV abx  CT scan 11/8 showing improvement in contained perforation  Tolerating sips  CT scan of 11/11 showing further improvement no extravasation  7.    Given abscess form diverticular perforation, will consult ID for abx recs on dc   8.    FLD and ADAT  Gsx on consult  Wean TPN, ADAT  Repeat studies as above  Okay to discharge home   ID on consult  Monitor off abx at this time  PRN pain control  Hypothyroidism  HLD  Cont home meds    Patient is to follow up with PCP, GI, ID and Gsx as opt for further care. Discharge meds ordered per surgery, ID and GI recs.   Hold home antihypertensives as the patient has not required them throughout her hospital stay and her BP is low/normal. She is to maintain a BP diary and follow up with her PCP for further care.   Patient is to remain compliant with all discharge medications and instructions and to follow up as advised.   Patient encouraged to make healthy lifestyle and dietary changes.    Lace+ Score: 50  59-90 High Risk  29-58 Medium Risk  0-28   Low Risk       TCM Follow-Up Recommendation:  LACE 29-58: Moderate Risk of readmission after discharge from the hospital.      Procedures during hospitalization:   EGD    Incidental or significant findings and recommendations (brief descriptions):  None    Lab/Test results pending at Discharge:   None    Consultants:  GI  ID  GSx    Discharge Medication List:     Discharge Medications        CHANGE how you take these medications         Instructions Prescription details   albuterol 108 (90 Base) MCG/ACT Aers  Commonly known as: Ventolin HFA  What changed: Another medication with the same name was removed. Continue taking this medication, and follow the directions you see here.      Inhale 2 puffs into the lungs every 4 (four) hours as needed for Wheezing.   Quantity: 18 g  Refills: 3     alendronate 70 MG Tabs  Commonly known as: Fosamax  What changed: how much to take      TAKE 1 TABLET BY MOUTH EVERY WEEK   Quantity: 12 tablet  Refills: 3     fluticasone propionate 50 MCG/ACT Susp  Commonly known as: Flonase  What changed: Another medication with the same name was removed. Continue taking this medication, and follow the directions you see here.      2 sprays by Nasal route daily.   Quantity: 16 g  Refills: 0            CONTINUE taking these medications        Instructions Prescription details   clotrimazole-betamethasone 1-0.05 % Crea  Commonly known as: Lotrisone      Topically bid   Quantity: 60 g  Refills: 3     ergocalciferol 1.25 MG (96019 UT) Caps  Commonly known as: Vitamin D2      Take 1 capsule (50,000 Units total) by mouth once a week.   Quantity: 12 capsule  Refills: 3     gabapentin 100 MG Caps  Commonly known as: Neurontin      Take 1 capsule (100 mg total) by mouth nightly.   Quantity: 30 capsule  Refills: 3     levothyroxine 88 MCG Tabs  Commonly known as: Synthroid      Take 1 tablet (88 mcg total) by mouth before breakfast.   Quantity: 90 tablet  Refills: 3     nystatin-triamcinolone 100,000-0.1 Units/g-% Crea  Commonly known as: Mycolog II      Topically bid   Quantity: 1 Tube  Refills: 3     pantoprazole 40 MG Tbec  Commonly known as: Protonix      Take 1 tablet (40 mg total) by mouth every morning before breakfast.   Quantity: 90 tablet  Refills: 3     simvastatin 10 MG Tabs  Commonly known as: Zocor      Take 1 tablet (10 mg total) by mouth nightly.   Quantity: 90 tablet  Refills: 3     zolpidem 10 MG Tabs  Commonly known  as: Ambien      Take 1 tablet (10 mg total) by mouth nightly as needed for Sleep.   Quantity: 30 tablet  Refills: 3            STOP taking these medications      aspirin 81 MG Tbec  Commonly known as: Aspirin Low Dose        benzonatate 200 MG Caps  Commonly known as: Tessalon        cyclobenzaprine 10 MG Tabs  Commonly known as: Flexeril        diclofenac 50 MG Tbec  Commonly known as: Voltaren        Diclofenac Sodium 1 % Gel  Commonly known as: Voltaren        doxycycline 100 MG Caps  Commonly known as: Vibramycin        fluconazole 100 MG Tabs  Commonly known as: Diflucan        Fungoid Tincture 2 % Soln  Generic drug: Miconazole Nitrate        furosemide 40 MG Tabs  Commonly known as: Lasix        HYDROcodone-acetaminophen 5-325 MG Tabs  Commonly known as: Norco        ibuprofen 400 MG Tabs  Commonly known as: Motrin        ibuprofen 600 MG Tabs  Commonly known as: Motrin        levocetirizine 5 MG Tabs  Commonly known as: Xyzal        LORazepam 0.5 MG Tabs  Commonly known as: Ativan        predniSONE 10 MG Tabs  Commonly known as: Deltasone        propranolol 20 MG Tabs  Commonly known as: Inderal        traMADol 50 MG Tabs  Commonly known as: Ultram        traZODone 100 MG Tabs  Commonly known as: Desyrel        triamcinolone 0.1 % Crea  Commonly known as: Kenalog                 [facility] reviewed: yes    Follow-up appointment:   Cristel Arias MD  [address] [facility] [id number] [phone]    Follow up in 1 week(s)      Otto Parikh MD  [address] [facility] [address]  [facility] [id number] [phone]    Follow up in 1 week(s)      Isma Alvarez MD  [address] [facility] [address]  [facility] [id number] [phone]    Follow up in 1 week(s)      Jazz Sanches MD  [address] [facility] [id number] [phone]    Follow up in 1 week(s)      Appointments for Next 30 Days 11/14/2024 - 12/14/2024        Date Arrival Time Visit Type Length Department Provider     11/21/2024  3:00 PM  PHYSICAL -  ESTABLISHED PATIENT [2834] 30 min [facility], [facility] [place] Cristel Arias MD    Patient Instructions:         Location Instructions:     Masks are optional for all patients and visitors, unless otherwise indicated.                      Vital signs:  Temp:  [97.6 °F (36.4 °C)-97.9 °F (36.6 °C)] 97.6 °F (36.4 °C)  Pulse:  [79-87] 79  Resp:  [16-17] 17  BP: (102-124)/(55-70) 102/55  SpO2:  [97 %] 97 %    Physical Exam:    Gen: NAD AO x3  Chest: good air entry CTABL  CVS: normal s1 and s2 RR  Abd: NABS soft NT ND  Neuro: CN 2-12 grossly intact  Ext: no edema in bilateral LE    -----------------------------------------------------------------------------------------------  PATIENT DISCHARGE INSTRUCTIONS: See electronic chart    Arpit Hutchins MD  Hospitalist    Time spent:  > 30 minutes    The 21st Century Cures Act makes medical notes like these available to patients in the interest of transparency. Please be advised this is a medical document. Medical documents are intended to carry relevant information, facts as evident, and the clinical opinion of the practitioner. The medical note is intended as peer to peer communication and may appear blunt or direct. It is written in medical language and may contain abbreviations or verbiage that are unfamiliar.     Electronically signed by Arpit Hutchins MD on 11/14/2024 12:12 PM         REVIEWER COMMENTS

## 2024-11-15 NOTE — PROGRESS NOTES
LM for pt to call [facility] for condition update since discharge. [facility] phone number was provided for pt to call back.

## 2024-11-15 NOTE — TELEPHONE ENCOUNTER
We can change her 11/21/24 apt into a tcm.    QING       Will change appt VT as approved to HFU. Closing encounter.

## 2024-11-15 NOTE — TELEPHONE ENCOUNTER
I spoke to daughter.  Wanted to book the appointment out a little bit to allow her time to get in to see surgery first.  Accepted below appointment and given detailed directions to office.    Your Appointments      Monday December 16, 2024 4:30 PM  Consult with Radha Dickens PA-C  [facility] [address] [address]  [phone]

## 2024-11-21 ENCOUNTER — OFFICE VISIT (OUTPATIENT)
Dept: INTERNAL MEDICINE CLINIC | Facility: CLINIC | Age: 74
End: 2024-11-21
Payer: MEDICAID

## 2024-11-21 VITALS
SYSTOLIC BLOOD PRESSURE: 120 MMHG | WEIGHT: 134 LBS | HEIGHT: 61.42 IN | DIASTOLIC BLOOD PRESSURE: 70 MMHG | BODY MASS INDEX: 24.98 KG/M2

## 2024-11-21 DIAGNOSIS — F17.200 SMOKER: ICD-10-CM

## 2024-11-21 DIAGNOSIS — E03.9 ACQUIRED HYPOTHYROIDISM: ICD-10-CM

## 2024-11-21 DIAGNOSIS — K26.5 PERFORATED DUODENAL ULCER (HCC): Primary | ICD-10-CM

## 2024-11-21 PROCEDURE — 99214 OFFICE O/P EST MOD 30 MIN: CPT | Performed by: INTERNAL MEDICINE

## 2024-11-21 NOTE — PROGRESS NOTES
Subjective:   Dorys Pickens is a 73 year old female who presents for Hospital F/U     Patient here for post hospitalization for perforated dudenal ulcer which was treated conservatively  History/Other: Had an intraabdominal fluid collection which resolv ed with IV antibiotics and bowel rest.   Chief Complaint Reviewed and Verified  No Further Nursing Notes to   Review  Medications Reviewed  Problem List Reviewed         Tobacco:smoker  Social History     Tobacco Use   Smoking Status Every Day    Current packs/day: 0.50    Average packs/day: 0.5 packs/day for 57.4 years (28.7 ttl pk-yrs)    Types: Cigarettes    Start date: 6/26/1967   Smokeless Tobacco Never     E-Cigarettes/Vaping       Questions Responses    E-Cigarette Use Never User           Tobacco cessation counseling for 3-10 minutes (add E/M code #61463).      Current Outpatient Medications   Medication Sig Dispense Refill    fluticasone propionate 50 MCG/ACT Nasal Suspension 2 sprays by Nasal route daily. 16 g 0    albuterol 108 (90 Base) MCG/ACT Inhalation Aero Soln Inhale 2 puffs into the lungs every 4 (four) hours as needed for Wheezing. 18 g 3    ergocalciferol 1.25 MG (20868 UT) Oral Cap Take 1 capsule (50,000 Units total) by mouth once a week. 12 capsule 3    levothyroxine 88 MCG Oral Tab Take 1 tablet (88 mcg total) by mouth before breakfast. 90 tablet 3    simvastatin 10 MG Oral Tab Take 1 tablet (10 mg total) by mouth nightly. 90 tablet 3    zolpidem 10 MG Oral Tab Take 1 tablet (10 mg total) by mouth nightly as needed for Sleep. 30 tablet 3    pantoprazole 40 MG Oral Tab EC Take 1 tablet (40 mg total) by mouth every morning before breakfast. 90 tablet 3    gabapentin 100 MG Oral Cap Take 1 capsule (100 mg total) by mouth nightly. 30 capsule 3    nystatin-triamcinolone 170160-6.1 UNIT/GM-% External Cream Topically bid 1 Tube 3    clotrimazole-betamethasone 1-0.05 % External Cream Topically bid 60 g 3         Review of Systems:  Review of  Systems   Gastrointestinal:  Negative for abdominal pain, constipation and vomiting.   Neurological:  Positive for weakness.         Objective:   /70   Ht 5' 1.42\" (1.56 m)   Wt 134 lb (60.8 kg)   BMI 24.97 kg/m²  Estimated body mass index is 24.97 kg/m² as calculated from the following:    Height as of this encounter: 5' 1.42\" (1.56 m).    Weight as of this encounter: 134 lb (60.8 kg).  Physical Exam  Constitutional:       Appearance: Normal appearance. She is normal weight.   HENT:      Head: Normocephalic and atraumatic.   Eyes:      General: No scleral icterus.     Pupils: Pupils are equal, round, and reactive to light.   Cardiovascular:      Rate and Rhythm: Normal rate and regular rhythm.      Heart sounds: No murmur heard.     No gallop.   Pulmonary:      Effort: Pulmonary effort is normal.      Breath sounds: Normal breath sounds.   Abdominal:      General: There is no distension.      Palpations: Abdomen is soft. There is no mass.      Tenderness: There is no abdominal tenderness.   Musculoskeletal:      Cervical back: Neck supple.      Right lower leg: No edema.      Left lower leg: No edema.   Lymphadenopathy:      Cervical: No cervical adenopathy.   Skin:     Findings: No lesion.   Neurological:      Mental Status: She is alert. Mental status is at baseline.   Psychiatric:         Thought Content: Thought content normal.           Assessment & Plan:   1. Perforated duodenal ulcer (HCC) (Primary) continue Protonix 40 mg daily    2. Smoker - smoking cessation stressed for prevention of recurrance.  3. Hypothyroidism - on levothyroxine replacement 88 mcg daily    No follow-ups on file.    Cristel Arias MD, 11/21/2024, 3:04 PM

## 2024-11-22 NOTE — PROGRESS NOTES
.Multiple attempts to reach the pt with no returned phone call. Pt went to HFU with PCP on 11/21/24, closing encounter.

## 2024-11-25 ENCOUNTER — TELEPHONE (OUTPATIENT)
Dept: FAMILY MEDICINE CLINIC | Facility: CLINIC | Age: 74
End: 2024-11-25

## 2024-11-26 ENCOUNTER — TELEPHONE (OUTPATIENT)
Dept: INTERNAL MEDICINE CLINIC | Facility: CLINIC | Age: 74
End: 2024-11-26

## 2024-11-26 NOTE — TELEPHONE ENCOUNTER
Please call daughter Ivanna Purcell back regarding her mother  Lilly Pickens,  1950, to discuss what is going on with the her mother.  She has concerns about her mother's health.

## 2024-11-26 NOTE — TELEPHONE ENCOUNTER
Called pts daughter and she has concerns about diarrhea after hospital stay for perforated ulcer.    Was on prolonged IV abs - sameer get c. Diff toxin pcr.    D.P.

## 2024-11-29 ENCOUNTER — LAB ENCOUNTER (OUTPATIENT)
Dept: LAB | Age: 74
End: 2024-11-29
Attending: INTERNAL MEDICINE
Payer: MEDICAID

## 2024-11-30 ENCOUNTER — TELEPHONE (OUTPATIENT)
Dept: INTERNAL MEDICINE CLINIC | Facility: CLINIC | Age: 74
End: 2024-11-30

## 2024-11-30 RX ORDER — VANCOMYCIN HYDROCHLORIDE 125 MG/1
125 CAPSULE ORAL 4 TIMES DAILY
Qty: 40 CAPSULE | Refills: 0 | Status: SHIPPED | OUTPATIENT
Start: 2024-11-30 | End: 2024-12-10

## 2024-12-02 ENCOUNTER — TELEPHONE (OUTPATIENT)
Dept: INTERNAL MEDICINE CLINIC | Facility: CLINIC | Age: 74
End: 2024-12-02

## 2024-12-02 NOTE — TELEPHONE ENCOUNTER
Paged by the Lab on Saturday 11/30/24   Positive C diff   Talked to her daughter listed , positive c diff results   Advised to start vancomycin and follow up with Dr Arias in the clinic   If abdominal pain, worsening symptoms, severe diarrhea to go to the ER

## 2024-12-12 DIAGNOSIS — F51.01 PRIMARY INSOMNIA: ICD-10-CM

## 2024-12-12 RX ORDER — ZOLPIDEM TARTRATE 10 MG/1
10 TABLET ORAL NIGHTLY PRN
Qty: 30 TABLET | Refills: 0 | Status: SHIPPED | OUTPATIENT
Start: 2024-12-12

## 2024-12-16 ENCOUNTER — TELEPHONE (OUTPATIENT)
Facility: CLINIC | Age: 74
End: 2024-12-16

## 2024-12-16 ENCOUNTER — OFFICE VISIT (OUTPATIENT)
Facility: CLINIC | Age: 74
End: 2024-12-16

## 2024-12-16 VITALS
BODY MASS INDEX: 25.53 KG/M2 | DIASTOLIC BLOOD PRESSURE: 81 MMHG | WEIGHT: 137 LBS | SYSTOLIC BLOOD PRESSURE: 136 MMHG | HEART RATE: 88 BPM | HEIGHT: 61.42 IN

## 2024-12-16 DIAGNOSIS — Z86.19 HISTORY OF CLOSTRIDIOIDES DIFFICILE COLITIS: ICD-10-CM

## 2024-12-16 DIAGNOSIS — R19.4 BOWEL HABIT CHANGES: ICD-10-CM

## 2024-12-16 DIAGNOSIS — Z12.11 COLON CANCER SCREENING: ICD-10-CM

## 2024-12-16 DIAGNOSIS — K26.5 DUODENAL ULCER WITH PERFORATION (HCC): Primary | ICD-10-CM

## 2024-12-16 DIAGNOSIS — Z12.11 COLON CANCER SCREENING: Primary | ICD-10-CM

## 2024-12-16 PROCEDURE — 99214 OFFICE O/P EST MOD 30 MIN: CPT | Performed by: PHYSICIAN ASSISTANT

## 2024-12-16 RX ORDER — PANTOPRAZOLE SODIUM 40 MG/1
40 TABLET, DELAYED RELEASE ORAL
Qty: 120 TABLET | Refills: 0 | Status: SHIPPED | OUTPATIENT
Start: 2024-12-16 | End: 2025-02-14

## 2024-12-16 NOTE — TELEPHONE ENCOUNTER
Hi Dr. Arias,     I saw Ms. Pickens in follow up today. She is doing well, however is out of propanolol and I let her know I would reach out to your office to see if you are able to send a refill or if she needs an office visit.     Radha Dickens PA-C

## 2024-12-16 NOTE — TELEPHONE ENCOUNTER
Scheduled for:  Colonoscopy [id number]   Location:  [facility] ([facility])  23+15=38  Provider: NUNO Peguero MD    Date:  3/26/2025  Wednesday   Time:   10:05 am  (Patient made aware will receive phone call the day before with an arrival time)    Sedation:  MAC  Prep:  Split [facility]    Diagnosis with codes:    ICD-10-CM   1. Colon cancer screening  Z12.11        Meds/Allergies Reconciled?:   Provider Reviewed   Was patient informed to call insurance with codes (Y/N):  Yes  Referral sent?: Referral was sent at the time of electronic surgical scheduling.  [facility] or [facility] notified?: I sent an electronic request to ENDO Scheduling and received a confirmation today.     Medication Orders:  Patient is aware to NOT take iron pills, herbal meds and diet supplements for 7 days before exam. Also to NOT take any form of alcohol, recreational drugs and any forms of ED meds 24 hours before exam.       Misc Orders:  N/A   Further instructions given by staff:  I Provided prep instructions to patient and reviewed date, time and location. Patient verbalized that  She / Her understood and is aware to call with any questions.  Patient was informed about the new cancellation policy for She / Her procedure. Patient was also given copy of the cancellation policy at the time of the appointment and verbalized understanding.

## 2024-12-16 NOTE — PATIENT INSTRUCTIONS
Recommendations:  Bowel movements   Flatulence   - continue yogurt and pro/pre biotic  - can add gas ex     Duodenal ulcer/perforation  -keep follow up with surgery on 12/23  -continue pantoprazole 40 mg twice a day     1. Schedule colonoscopy with Dr. Peguero after 2/1/2025 [Diagnosis: crc screening]    2.  bowel prep from pharmacy ([facility])    3. Continue all medications as normal for your procedure.    4. Read all bowel prep instructions carefully. Bowel prep instructions can also be found online at:  www.health.org/giprep     5. AVOID seeds, nuts, popcorn, raw fruits and vegetables for 3 days before procedure    6. You MAY need to go for COVID testing 72 hours before procedure. The testing team will call you a few days before your procedure to discuss with you if testing is required. If you are asked to go for COVID testing and do not completed the test, the procedure cannot be performed.     7. If you start any NEW medication after your visit today, please notify us. Certain medications (like iron or weight loss medications) will need to be held before the procedure, or the procedure cannot be performed safely.

## 2024-12-16 NOTE — PROGRESS NOTES
[facility] - Gastroenterology                                                                                                               Reason for consult:   Chief Complaint   Patient presents with    ER F/U       Requesting physician or provider: Cristel Arias MD      HPI:   Dorys Pickens is a 73 year old year-old female with history of  HLD, +smoker, who presents with acute epigastric pain x 3 days. WBC 14 on admission. CT a/p shows a contained perforation in D2, possible complicated diverticulitis vs perforated peptic ulcer disease. Upper GI x-ray shows extraluminal extravasation of contrast at the descending portion of the duodenal sweep just distal to the duodenal bulb which appears to be contained and overall measures about 2.3 x 2.4 cm. Underwent EGD with Dr. Peguero noted duodenal diverticulitis with perforation in the duodenal sweep, no mass, gastric erythema noted. GI signed off and surgery continued to follow. Last CT on 11/11/2024 with interval decrease in size of extraluminal collection, inflammatory changes improved. Presents today for follow up.     Perforation of peptic ulcer-  - in general feeling better  - has been tolerating a soft diet  - has follow up with surgery team on 12/23  - has been taking pantoprazole   - no vomiting   - no nausea  -GERD well controlled  -no dysphagia or globus     C diff-  -patient had increased of diarrhea after discharge from hospital   -stool testing complete by PCP noted c diff  -patient completed 10 day course  -now notes can have time of increased straining with BM and often increased gas  -does have BM daily, formed can be small and pieve like  -denies lower abdominal pain  -denies melena, hematochezia or melena    NSAIDS: none  Tobacco: continues to smoke daily, half a pack  Alcohol: none  Marijuana: none  Illicit drugs: none    FH GI malignancy- none  FH celiac dz- none  FH liver dz- none  FH IBD-  none    Prior endoscopies:  11/1/2024- EGD  Impression:  1. Duodenal diverticulitis with perforation visualized in the duodenal sweep.  2. No mass or ulcer noted.  3. Gastric erythema noted.     Recommend:  1. Await pathology.  2. Discussed with Dr. Alvarez who was present to see the endoscopy - plan for NPO + PICC/TPN. Re-image in a few days.  3. IV PPI to continue.  4. Avoid oral medications.    Refused colonoscopy.     Wt Readings from Last 6 Encounters:   12/16/24 137 lb (62.1 kg)   11/21/24 134 lb (60.8 kg)   11/11/24 135 lb 6.4 oz (61.4 kg)   06/24/24 137 lb (62.1 kg)   06/20/24 138 lb (62.6 kg)   06/20/24 138 lb (62.6 kg)        History, Medications, Allergies, ROS:      Past Medical History:    Disorder of thyroid    High cholesterol    Hyperlipidemia      Past Surgical History:   Procedure Laterality Date    Cholecystectomy      Egd N/A 11/01/2024    Dr. Peguero      Family Hx: No family history on file.   Social History:   Social History     Socioeconomic History    Marital status:    Tobacco Use    Smoking status: Every Day     Current packs/day: 0.50     Average packs/day: 0.5 packs/day for 57.5 years (28.7 ttl pk-yrs)     Types: Cigarettes     Start date: 6/26/1967    Smokeless tobacco: Never   Vaping Use    Vaping status: Never Used   Substance and Sexual Activity    Alcohol use: No    Drug use: No    Sexual activity: Not Currently     Social Drivers of Health     Food Insecurity: No Food Insecurity (10/29/2024)    Food Insecurity     Food Insecurity: Never true   Transportation Needs: No Transportation Needs (10/29/2024)    Transportation Needs     Lack of Transportation: No   Housing Stability: Low Risk  (10/29/2024)    Housing Stability     Housing Instability: No        Medications (Active prior to today's visit):  Current Outpatient Medications   Medication Sig Dispense Refill    pantoprazole 40 MG Oral Tab EC Take 1 tablet (40 mg total) by mouth 2 (two) times daily before meals. 120 tablet 0     ZOLPIDEM 10 MG Oral Tab TAKE 1 TABLET(10 MG) BY MOUTH EVERY NIGHT AS NEEDED FOR SLEEP 30 tablet 0    fluticasone propionate 50 MCG/ACT Nasal Suspension 2 sprays by Nasal route daily. 16 g 0    albuterol 108 (90 Base) MCG/ACT Inhalation Aero Soln Inhale 2 puffs into the lungs every 4 (four) hours as needed for Wheezing. 18 g 3    ergocalciferol 1.25 MG (32219 UT) Oral Cap Take 1 capsule (50,000 Units total) by mouth once a week. 12 capsule 3    levothyroxine 88 MCG Oral Tab Take 1 tablet (88 mcg total) by mouth before breakfast. 90 tablet 3    simvastatin 10 MG Oral Tab Take 1 tablet (10 mg total) by mouth nightly. 90 tablet 3    pantoprazole 40 MG Oral Tab EC Take 1 tablet (40 mg total) by mouth every morning before breakfast. 90 tablet 3    gabapentin 100 MG Oral Cap Take 1 capsule (100 mg total) by mouth nightly. 30 capsule 3    nystatin-triamcinolone 118176-4.1 UNIT/GM-% External Cream Topically bid 1 Tube 3    clotrimazole-betamethasone 1-0.05 % External Cream Topically bid 60 g 3    propranolol 20 MG Oral Tab Take 1 tablet (20 mg total) by mouth 2 (two) times daily. 180 tablet 3       Allergies:  Allergies[1]    ROS:   CONSTITUTIONAL: negative for fevers, chills, sweats and weight loss  EYES Negative for red eyes, yellow eyes, changes in vision  HEENT: Negative for dysphagia and hoarseness  RESPIRATORY: Negative for cough and shortness of breath  CARDIOVASCULAR: Negative for chest pain, palpitations  GASTROINTESTINAL: See HPI  GENITOURINARY: Negative for dysuria and frequency  MUSCULOSKELETAL: Negative for arthralgias and myalgias  NEUROLOGICAL: Negative for dizziness and headaches  BEHAVIOR/PSYCH: Negative for anxiety and poor appetite    PHYSICAL EXAM:   Blood pressure 136/81, pulse 88, height 5' 1.42\" (1.56 m), weight 137 lb (62.1 kg), not currently breastfeeding.    GEN: WD/WN, NAD  HEENT: Supple symmetrical, trachea midline  CV: RRR, the extremities are warm and well perfused   LUNGS: No increased work  of breathing  ABDOMEN: No scars, normal bowel sounds, soft, non-tender, non-distended no rebound or guarding, no masses, no hepatomegaly  MSK: No redness, no warmth, no swelling of joints  SKIN: No jaundice, no erythema, no rashes  HEMATOLOGIC: No bleeding, no bruising  NEURO: Alert and interactive, normal gait    Labs/Imaging/Procedures:     Patient's pertinent labs and imaging were reviewed and discussed with patient today.     Lab Results   Component Value Date    WBC 5.7 11/12/2024    RBC 4.18 11/12/2024    HGB 12.7 11/12/2024    HCT 37.6 11/12/2024    MCV 90.0 11/12/2024    MCH 30.4 11/12/2024    MCHC 33.8 11/12/2024    RDW 13.2 11/12/2024    .0 11/12/2024    MPV 8.9 02/02/2018        Lab Results   Component Value Date     (H) 11/13/2024    BUN 16 11/13/2024    BUNCREA 30.8 (H) 11/13/2024    CREATSERUM 0.52 (L) 11/13/2024    ANIONGAP 7 11/13/2024    GFRNAA 89 01/07/2022    GFRAA 103 01/07/2022    CA 9.1 11/13/2024    OSMOCALC 298 (H) 11/13/2024    ALKPHO 50 (L) 11/10/2024    AST 16 11/10/2024    ALT 20 11/10/2024    ALKPHOS 59 08/19/2016    BILT 0.2 11/10/2024    TP 6.0 11/10/2024    ALB 3.4 11/12/2024    GLOBULIN 2.3 11/10/2024    AGRATIO 1.6 01/07/2022     11/13/2024    K 4.0 11/13/2024     11/13/2024    CO2 26.0 11/13/2024        No results found.          .  ASSESSMENT/PLAN:   Dorsy Pickens is a 73 year old year-old female with history of  HLD, +smoker, who presents with acute epigastric pain x 3 days. WBC 14 on admission. CT a/p shows a contained perforation in D2, possible complicated diverticulitis vs perforated peptic ulcer disease. Upper GI x-ray shows extraluminal extravasation of contrast at the descending portion of the duodenal sweep just distal to the duodenal bulb which appears to be contained and overall measures about 2.3 x 2.4 cm. Underwent EGD with Dr. Peguero noted duodenal diverticulitis with perforation in the duodenal sweep, no mass, gastric erythema noted. GI  signed off and surgery continued to follow. Last CT on 11/11/2024 with interval decrease in size of extraluminal collection, inflammatory changes improved. Presents today for follow up.     #duodenal ulcer/diverticulum perforation   -at this time taking pantoprazole and tolerating diet  -has follow up with surgery for 12/23  -dicussed plan to continue PPI at this time and lifestyle changes to avoid recurrent ulcer or perforation   -patient to follow up as needed    #bowel habits  #crc screening   #hx of c diff  -no prior colonoscopy   -patient had episode of c diff, now BM formed   -no family hx of colon cancer, weight has been stable  -no brbpr, hematochezia  -discussed plan to screen for cancer at this time, patient is agreeable     Recommendations:  Bowel movements   Flatulence   - continue yogurt and pro/pre biotic  - can add gas ex     Duodenal ulcer/perforation  -keep follow up with surgery on 12/23  -continue pantoprazole 40 mg twice a day     1. Schedule colonoscopy with Dr. Pegureo after 2/1/2025 [Diagnosis: crc screening]    2.  bowel prep from pharmacy (split golytely)    3. Continue all medications as normal for your procedure.    4. Read all bowel prep instructions carefully. Bowel prep instructions can also be found online at:  [email or web address].[email or web address]     5. AVOID seeds, nuts, popcorn, raw fruits and vegetables for 3 days before procedure    6. You MAY need to go for COVID testing 72 hours before procedure. The testing team will call you a few days before your procedure to discuss with you if testing is required. If you are asked to go for COVID testing and do not completed the test, the procedure cannot be performed.     7. If you start any NEW medication after your visit today, please notify us. Certain medications (like iron or weight loss medications) will need to be held before the procedure, or the procedure cannot be performed safely.        Orders This Visit:  No orders of the defined  types were placed in this encounter.      Meds This Visit:  Requested Prescriptions     Signed Prescriptions Disp Refills    pantoprazole 40 MG Oral Tab  tablet 0     Sig: Take 1 tablet (40 mg total) by mouth 2 (two) times daily before meals.       Imaging & Referrals:  None      Radha Dickens PA-C   12/16/2024        This note was partially prepared using Dragon Medical voice recognition dictation software. As a result, errors may occur. When identified, these errors have been corrected. While every attempt is made to correct errors during dictation, discrepancies may still exist.          [1]   Allergies  Allergen Reactions    Mushrooms ANAPHYLAXIS    Pcn [Penicillins] SHORTNESS OF BREATH     Pt tolerated ceftriaxone in the ED on 10/29/24    Amoxicillin     Sulfa Antibiotics

## 2024-12-20 ENCOUNTER — MED REC SCAN ONLY (OUTPATIENT)
Dept: INTERNAL MEDICINE CLINIC | Facility: CLINIC | Age: 74
End: 2024-12-20

## 2024-12-23 RX ORDER — PANTOPRAZOLE SODIUM 40 MG/1
40 TABLET, DELAYED RELEASE ORAL
Qty: 180 TABLET | Refills: 0 | OUTPATIENT
Start: 2024-12-23

## 2024-12-23 NOTE — TELEPHONE ENCOUNTER
Requested Prescriptions     Pending Prescriptions Disp Refills    PANTOPRAZOLE 40 MG Oral Tab EC [Pharmacy Med Name: PANTOPRAZOLE 40MG TABLETS] 180 tablet 0     Sig: TAKE 1 TABLET(40 MG) BY MOUTH TWICE DAILY BEFORE MEALS     Refill sent on 12/16/2024.

## 2024-12-28 ENCOUNTER — HOSPITAL ENCOUNTER (OUTPATIENT)
Age: 74
Discharge: HOME | End: 2024-12-28
Payer: MEDICAID

## 2024-12-28 VITALS
HEART RATE: 95 BPM | TEMPERATURE: 98 F | OXYGEN SATURATION: 96 % | DIASTOLIC BLOOD PRESSURE: 64 MMHG | RESPIRATION RATE: 18 BRPM | SYSTOLIC BLOOD PRESSURE: 123 MMHG

## 2024-12-28 DIAGNOSIS — R19.7 DIARRHEA, UNSPECIFIED TYPE: Primary | ICD-10-CM

## 2024-12-28 DIAGNOSIS — R10.30 LOWER ABDOMINAL PAIN: ICD-10-CM

## 2024-12-28 LAB — C DIFF TOX B STL QL: POSITIVE

## 2024-12-28 PROCEDURE — 99213 OFFICE O/P EST LOW 20 MIN: CPT | Performed by: NURSE PRACTITIONER

## 2024-12-28 NOTE — ED INITIAL ASSESSMENT (HPI)
# [id number]    Patient reports abdominal discomfort with diarrhea, had hospitalization 2 months ago.

## 2024-12-28 NOTE — ED PROVIDER NOTES
Patient Seen in: [facility]      History     Chief Complaint   Patient presents with    Abdominal Pain     Stated Complaint: abdominal  pain  Subjective:   HPI      This is a 74-year-old Polish speaking female with history of hyperlipidemia thyroid disorder duodenal ulcer with perforation and history of C. difficile.  Via Polish  on language line solutions patient states that she was in the hospital 2 months ago for a stomach infection did follow-up outpatient with the gastroenterologist was on antibiotics for the stomach infection.  Patient states she saw the gastroenterologist 2 weeks ago she was feeling fine she was started on Protonix.  Patient states in the last day or 2 she has developed lower abdominal pain and diarrhea and took an over-the-counter antidiarrheal medication and wanted to be evaluated denies fever nausea or vomiting urinary symptoms back pain chest pain shortness of breath or recent viral symptoms.    Objective:     Past Medical History:    Disorder of thyroid    High cholesterol    Hyperlipidemia              Past Surgical History:   Procedure Laterality Date    Cholecystectomy      Egd N/A 11/01/2024    Dr. Peguero                Social History     Socioeconomic History    Marital status:    Tobacco Use    Smoking status: Every Day     Current packs/day: 0.50     Average packs/day: 0.5 packs/day for 57.5 years (28.8 ttl pk-yrs)     Types: Cigarettes     Start date: 6/26/1967    Smokeless tobacco: Never   Vaping Use    Vaping status: Never Used   Substance and Sexual Activity    Alcohol use: No    Drug use: No    Sexual activity: Not Currently     Social Drivers of Health     Food Insecurity: No Food Insecurity (10/29/2024)    Food Insecurity     Food Insecurity: Never true   Transportation Needs: No Transportation Needs (10/29/2024)    Transportation Needs     Lack of Transportation: No   Housing Stability: Low Risk  (10/29/2024)    Housing Stability     Housing  Instability: No              Review of Systems    Positive for stated complaint: Leg pain  Other systems are as noted in HPI.  Constitutional and vital signs reviewed.      All other systems reviewed and negative except as noted above.    Physical Exam     ED Triage Vitals [12/28/24 1121]   /64   Pulse 95   Resp 18   Temp 98.2 °F (36.8 °C)   Temp src Oral   SpO2 96 %   O2 Device None (Room air)       Current Vitals:   Vital Signs  BP: 123/64  Pulse: 95  Resp: 18  Temp: 98.2 °F (36.8 °C)  Temp src: Oral    Oxygen Therapy  SpO2: 96 %  O2 Device: None (Room air)        Physical Exam  Vitals and nursing note reviewed.   Constitutional:       Appearance: Normal appearance.   HENT:      Right Ear: External ear normal.      Left Ear: External ear normal.      Nose: Nose normal.      Mouth/Throat:      Mouth: Mucous membranes are moist.      Pharynx: Oropharynx is clear.   Eyes:      Conjunctiva/sclera: Conjunctivae normal.   Cardiovascular:      Rate and Rhythm: Normal rate.   Pulmonary:      Effort: Pulmonary effort is normal.      Breath sounds: Normal breath sounds.   Abdominal:      General: Bowel sounds are normal.      Palpations: Abdomen is soft.      Tenderness: There is no abdominal tenderness. There is no right CVA tenderness, left CVA tenderness or guarding.   Musculoskeletal:         General: Normal range of motion.      Cervical back: Normal range of motion.   Skin:     General: Skin is warm.      Capillary Refill: Capillary refill takes less than 2 seconds.   Neurological:      General: No focal deficit present.      Mental Status: She is alert and oriented to person, place, and time.             ED Course     Labs Reviewed   C. DIFFICILE(TOXIGENIC)PCR   STOOL CULTURE W/SHIGATOXIN    Narrative:     The following orders were created for panel order Stool Culture W/Shigatoxin.  Procedure                               Abnormality         Status                     ---------                                -----------         ------                     Stool Culture[[id number]]                                    In process                 Shigatoxin[[id number]]                                       In process                   Please view results for these tests on the individual orders.   STOOL CULTURE(P)   SHIGATOXIN             MDM           Medical Decision Making  74-year-old female well-appearing nontoxic afebrile no distress with lower abdominal pain and diarrhea for 1 to 2 days.  DDx C. difficile versus a viral illness versus gastroenteritis versus colitis versus diverticulitis versus appendicitis.  Patient has no reproducible tenderness with deep palpation of the entire abdomen and no CVA tenderness so no clinical indication for labs or imaging but stool culture and C. difficile stool will be collected.  Via Polish  on language line solutions discussed this with the patient recommended stopping the antidiarrheal as she could have a viral illness that needs to get out of her system discussed brat diet pushing fluids avoiding spicy foods or fried foods outpatient follow-up with her PCP and her gastroenterologist and strict abdominal pain and ER precautions with any new or worsening symptoms.  Patient feels comfortable with this plan of care and acknowledges understanding discharge instructions.    Problems Addressed:  Diarrhea, unspecified type: acute illness or injury  Lower abdominal pain: acute illness or injury    Amount and/or Complexity of Data Reviewed  Labs: ordered. Decision-making details documented in ED Course.  Discussion of management or test interpretation with external provider(s): This patient was discussed with my attending Dr. Morales who agrees with this provider's management and plan of care.        Disposition and Plan     Clinical Impression:  1. Diarrhea, unspecified type    2. Lower abdominal pain         Disposition:  Discharge  12/28/2024 11:48 am    Follow-up:  GORDO Peguero  MD Jose L  [address] [address]  [phone]    Call   Follow-up with your gastroenterologist    Cristel Arias MD  [address] [facility] [address]  [phone]    Schedule an appointment as soon as possible for a visit in 3 days  Follow-up with primary care provider 2 to 3 days          Medications Prescribed:  Discharge Medication List as of 12/28/2024 11:49 AM              Supplementary Documentation:

## 2024-12-28 NOTE — DISCHARGE INSTRUCTIONS
Stop taking the antidiarrheal as what ever is in your system it needs to come out it could be C. difficile you will be notified of your stool culture results there is a possibility you could have a viral illness if you develop worsening abdominal pain nausea or vomiting noticed blood in the stool a fever urinary symptoms or any new or worsening symptoms go to the nearest emergency department otherwise follow-up with your primary care provider in 2 to 3 days and follow-up with your gastroenterologist.

## 2024-12-29 ENCOUNTER — TELEPHONE (OUTPATIENT)
Dept: INTERNAL MEDICINE CLINIC | Facility: CLINIC | Age: 74
End: 2024-12-29

## 2024-12-29 RX ORDER — VANCOMYCIN HYDROCHLORIDE 125 MG/1
125 CAPSULE ORAL 4 TIMES DAILY
Qty: 40 CAPSULE | Refills: 0 | Status: SHIPPED | OUTPATIENT
Start: 2024-12-29 | End: 2025-01-08

## 2024-12-29 NOTE — TELEPHONE ENCOUNTER
Paged by her daughter   Not able to  vancomycin from [facility] due to out of stock [facility]   Recommended sending it to another pharmacy   Sent to [facility]   She will notify me if not able to get the medication

## 2025-01-08 ENCOUNTER — LAB ENCOUNTER (OUTPATIENT)
Dept: LAB | Age: 75
End: 2025-01-08
Attending: INTERNAL MEDICINE
Payer: MEDICAID

## 2025-01-20 ENCOUNTER — OFFICE VISIT (OUTPATIENT)
Dept: INTERNAL MEDICINE CLINIC | Facility: CLINIC | Age: 75
End: 2025-01-20
Payer: MEDICAID

## 2025-01-20 VITALS
BODY MASS INDEX: 25.91 KG/M2 | DIASTOLIC BLOOD PRESSURE: 60 MMHG | WEIGHT: 139 LBS | HEIGHT: 61.42 IN | HEART RATE: 84 BPM | OXYGEN SATURATION: 96 % | SYSTOLIC BLOOD PRESSURE: 110 MMHG

## 2025-01-20 DIAGNOSIS — F51.01 PRIMARY INSOMNIA: ICD-10-CM

## 2025-01-20 DIAGNOSIS — K26.5: ICD-10-CM

## 2025-01-20 DIAGNOSIS — K27.9 PEPTIC ULCER DISEASE: Primary | ICD-10-CM

## 2025-01-20 RX ORDER — PROPRANOLOL HCL 20 MG
20 TABLET ORAL 2 TIMES DAILY
Qty: 180 TABLET | Refills: 3 | Status: SHIPPED | OUTPATIENT
Start: 2025-01-20

## 2025-01-20 RX ORDER — ZOLPIDEM TARTRATE 10 MG/1
10 TABLET ORAL NIGHTLY PRN
Qty: 30 TABLET | Refills: 5 | Status: SHIPPED | OUTPATIENT
Start: 2025-01-20

## 2025-01-20 RX ORDER — LEVOTHYROXINE SODIUM 88 UG/1
88 TABLET ORAL
Qty: 90 TABLET | Refills: 3 | Status: SHIPPED | OUTPATIENT
Start: 2025-01-20

## 2025-01-20 RX ORDER — FLUTICASONE PROPIONATE 50 MCG
2 SPRAY, SUSPENSION (ML) NASAL DAILY
Qty: 16 G | Refills: 5 | Status: SHIPPED | OUTPATIENT
Start: 2025-01-20

## 2025-01-20 RX ORDER — PANTOPRAZOLE SODIUM 40 MG/1
40 TABLET, DELAYED RELEASE ORAL
Qty: 90 TABLET | Refills: 3 | Status: SHIPPED | OUTPATIENT
Start: 2025-01-20

## 2025-01-20 RX ORDER — ALBUTEROL SULFATE 90 UG/1
2 INHALANT RESPIRATORY (INHALATION) EVERY 4 HOURS PRN
Qty: 18 G | Refills: 3 | Status: SHIPPED | OUTPATIENT
Start: 2025-01-20

## 2025-01-20 NOTE — PROGRESS NOTES
Subjective:   Dorys Pickens is a 74 year old female who presents for Follow - Up     Patient here for fu on hx of perforated duodenal ulcer later complicated by c diff colitis.  Curentlyhas no abdominal pain nor loose stools, c diff toxin  has come back negative.  History/Other:    Chief Complaint Reviewed and Verified  No Further Nursing Notes to   Review  Medications Reviewed  Problem List Reviewed         Tobacco:smoking 10- 15 a day  Social History     Tobacco Use   Smoking Status Every Day    Current packs/day: 0.50    Average packs/day: 0.5 packs/day for 57.6 years (28.8 ttl pk-yrs)    Types: Cigarettes    Start date: 6/26/1967   Smokeless Tobacco Never     E-Cigarettes/Vaping       Questions Responses    E-Cigarette Use Never User           Tobacco cessation counseling for 3-10 minutes (add E/M code #85420).      Current Outpatient Medications   Medication Sig Dispense Refill    levothyroxine 88 MCG Oral Tab Take 1 tablet (88 mcg total) by mouth before breakfast. 90 tablet 3    pantoprazole 40 MG Oral Tab EC Take 1 tablet (40 mg total) by mouth every morning before breakfast. 90 tablet 3    zolpidem 10 MG Oral Tab Take 1 tablet (10 mg total) by mouth nightly as needed. 30 tablet 5    albuterol 108 (90 Base) MCG/ACT Inhalation Aero Soln Inhale 2 puffs into the lungs every 4 (four) hours as needed for Wheezing. 18 g 3    fluticasone propionate 50 MCG/ACT Nasal Suspension 2 sprays by Nasal route daily. 16 g 5    propranolol 20 MG Oral Tab Take 1 tablet (20 mg total) by mouth 2 (two) times daily. 180 tablet 3    polyethylene glycol, PEG 3350-KCl-NaBcb-NaCl-NaSulf, 236 g Oral Recon Soln Take 4,000 mL by mouth As Directed. Take 2,000 mL the night before your procedure and 2,000 mL the morning of your procedure. 1 each 0    pantoprazole 40 MG Oral Tab EC Take 1 tablet (40 mg total) by mouth 2 (two) times daily before meals. 120 tablet 0    ergocalciferol 1.25 MG (41750 UT) Oral Cap Take 1 capsule (50,000 Units  total) by mouth once a week. 12 capsule 3    simvastatin 10 MG Oral Tab Take 1 tablet (10 mg total) by mouth nightly. 90 tablet 3    gabapentin 100 MG Oral Cap Take 1 capsule (100 mg total) by mouth nightly. 30 capsule 3    nystatin-triamcinolone 207919-7.1 UNIT/GM-% External Cream Topically bid 1 Tube 3    clotrimazole-betamethasone 1-0.05 % External Cream Topically bid 60 g 3         Review of Systems:  Review of Systems   Constitutional:  Positive for fatigue.   Gastrointestinal:  Negative for abdominal distention, abdominal pain and diarrhea.   Genitourinary:  Negative for flank pain.         Objective:   /60   Pulse 84   Ht 5' 1.42\" (1.56 m)   Wt 139 lb (63 kg)   SpO2 96%   BMI 25.91 kg/m²  Estimated body mass index is 25.91 kg/m² as calculated from the following:    Height as of this encounter: 5' 1.42\" (1.56 m).    Weight as of this encounter: 139 lb (63 kg).  Physical Exam  Constitutional:       Appearance: She is normal weight.   HENT:      Head: Normocephalic and atraumatic.   Eyes:      General: No scleral icterus.     Conjunctiva/sclera: Conjunctivae normal.      Pupils: Pupils are equal, round, and reactive to light.   Cardiovascular:      Rate and Rhythm: Normal rate and regular rhythm.   Pulmonary:      Effort: Pulmonary effort is normal.      Breath sounds: Normal breath sounds.   Abdominal:      General: There is no distension.      Palpations: Abdomen is soft.      Tenderness: There is no abdominal tenderness. There is no guarding.   Musculoskeletal:      Right lower leg: No edema.      Left lower leg: No edema.   Lymphadenopathy:      Cervical: No cervical adenopathy.   Skin:     Findings: No lesion.   Neurological:      Mental Status: She is alert.   Psychiatric:         Thought Content: Thought content normal.           Assessment & Plan:   1. Peptic ulcer disease (Primary) continue Pantaprazole 40 mg  2. Primary insomnia zolpidem prn  -     Zolpidem Tartrate; Take 1 tablet (10 mg  total) by mouth nightly as needed.  Dispense: 30 tablet; Refill: 5  3. Perforated chronic duodenal ulcer (HCC) repeat imaging with CT  -     CT ABDOMEN(CONTRAST ONLY) (CPT=74160); Future; Expected date: 01/20/2025  Other orders  -     Levothyroxine Sodium; Take 1 tablet (88 mcg total) by mouth before breakfast.  Dispense: 90 tablet; Refill: 3  -     Pantoprazole Sodium; Take 1 tablet (40 mg total) by mouth every morning before breakfast.  Dispense: 90 tablet; Refill: 3  -     Albuterol Sulfate HFA; Inhale 2 puffs into the lungs every 4 (four) hours as needed for Wheezing.  Dispense: 18 g; Refill: 3  -     Fluticasone Propionate; 2 sprays by Nasal route daily.  Dispense: 16 g; Refill: 5  -     Propranolol HCl; Take 1 tablet (20 mg total) by mouth 2 (two) times daily.  Dispense: 180 tablet; Refill: 3        No follow-ups on file.    Cristel Arias MD, 1/20/2025, 1:39 PM

## 2025-01-21 ENCOUNTER — TELEPHONE (OUTPATIENT)
Dept: INTERNAL MEDICINE CLINIC | Facility: CLINIC | Age: 75
End: 2025-01-21

## 2025-01-21 RX ORDER — VANCOMYCIN HYDROCHLORIDE 125 MG/1
125 CAPSULE ORAL 4 TIMES DAILY
Qty: 40 CAPSULE | Refills: 0 | Status: SHIPPED | OUTPATIENT
Start: 2025-01-21

## 2025-01-21 NOTE — TELEPHONE ENCOUNTER
Patient's daughter called the office.  Her mother saw Dr. Arias yesterday in the office.    She left in the lobby yesterday her CT iscan order.    They are asking if the order can please be mailed to her home.    Verified address.

## 2025-02-05 ENCOUNTER — TELEPHONE (OUTPATIENT)
Dept: INTERNAL MEDICINE CLINIC | Facility: CLINIC | Age: 75
End: 2025-02-05

## 2025-02-05 DIAGNOSIS — A04.72 C. DIFFICILE COLITIS: Primary | ICD-10-CM

## 2025-02-10 ENCOUNTER — LAB ENCOUNTER (OUTPATIENT)
Dept: LAB | Age: 75
End: 2025-02-10
Attending: INTERNAL MEDICINE
Payer: MEDICAID

## 2025-02-17 ENCOUNTER — TELEPHONE (OUTPATIENT)
Dept: INTERNAL MEDICINE CLINIC | Facility: CLINIC | Age: 75
End: 2025-02-17

## 2025-02-17 DIAGNOSIS — A04.72 C. DIFFICILE COLITIS: Primary | ICD-10-CM

## 2025-02-17 NOTE — TELEPHONE ENCOUNTER
Attempted to contact patient. Unable to reach. Message left on voicemail to call and speak with the nurse.

## 2025-02-17 NOTE — TELEPHONE ENCOUNTER
Patient's daughter, Ivanna called the office.  She said her mother has C-Diff again.    Going to the bathroom over 20 times a day.      Needs an antibiotic prescribed, as she has had C-Diff 3 other times.    [id number] - [place], [place] - [address] AT [facility] [address], [phone], [phone]   [facility] [place] [address]   Phone: [phone] Fax: [phone]   Hours: Not open 24 hours

## 2025-02-17 NOTE — TELEPHONE ENCOUNTER
Patient's daughter called back the office.  She is asking for a call back only from Dr. Arias.    She doesn't want to speak to a nurse, as Dr. Arias told her if her mother has another bout of C-Diff a stronger antibiotic will be needed and will need her insurance to approve. And to call her.

## 2025-03-13 RX ORDER — ALENDRONATE SODIUM 70 MG/1
TABLET ORAL
Qty: 12 TABLET | Refills: 3 | Status: SHIPPED | OUTPATIENT
Start: 2025-03-13

## 2025-03-14 ENCOUNTER — HOSPITAL ENCOUNTER (OUTPATIENT)
Dept: CT IMAGING | Facility: HOSPITAL | Age: 75
Discharge: HOME | End: 2025-03-14
Attending: INTERNAL MEDICINE
Payer: MEDICAID

## 2025-03-14 DIAGNOSIS — K26.5: ICD-10-CM

## 2025-03-14 LAB
CREAT BLD-MCNC: 0.7 MG/DL
EGFRCR SERPLBLD CKD-EPI 2021: 91 ML/MIN/1.73M2 (ref 60–?)

## 2025-03-14 PROCEDURE — 74160 CT ABDOMEN W/CONTRAST: CPT | Performed by: INTERNAL MEDICINE

## 2025-03-14 PROCEDURE — 82565 ASSAY OF CREATININE: CPT

## 2025-03-21 ENCOUNTER — TELEPHONE (OUTPATIENT)
Facility: CLINIC | Age: 75
End: 2025-03-21

## 2025-03-24 PROBLEM — M77.8 TENDINITIS OF RIGHT SHOULDER: Status: ACTIVE | Noted: 2019-09-18

## 2025-03-24 PROBLEM — M25.511 PAIN IN JOINT OF RIGHT SHOULDER: Status: ACTIVE | Noted: 2019-09-18

## 2025-03-26 ENCOUNTER — ANESTHESIA (OUTPATIENT)
Dept: ENDOSCOPY | Facility: HOSPITAL | Age: 75
End: 2025-03-26
Payer: MEDICAID

## 2025-03-26 ENCOUNTER — ANESTHESIA EVENT (OUTPATIENT)
Dept: ENDOSCOPY | Facility: HOSPITAL | Age: 75
End: 2025-03-26
Payer: MEDICAID

## 2025-03-26 ENCOUNTER — HOSPITAL ENCOUNTER (OUTPATIENT)
Facility: HOSPITAL | Age: 75
Setting detail: HOSPITAL OUTPATIENT SURGERY
Discharge: HOME | End: 2025-03-26
Attending: INTERNAL MEDICINE | Admitting: INTERNAL MEDICINE
Payer: MEDICAID

## 2025-03-26 VITALS
RESPIRATION RATE: 12 BRPM | BODY MASS INDEX: 25.95 KG/M2 | HEART RATE: 76 BPM | OXYGEN SATURATION: 99 % | DIASTOLIC BLOOD PRESSURE: 77 MMHG | HEIGHT: 61.75 IN | WEIGHT: 141 LBS | SYSTOLIC BLOOD PRESSURE: 138 MMHG

## 2025-03-26 DIAGNOSIS — B35.4 TINEA CORPORIS: Primary | ICD-10-CM

## 2025-03-26 DIAGNOSIS — Z12.11 COLON CANCER SCREENING: ICD-10-CM

## 2025-03-26 PROBLEM — K57.30 DIVERTICULA OF COLON: Status: ACTIVE | Noted: 2025-03-26

## 2025-03-26 PROBLEM — K63.5 POLYP OF COLON: Status: ACTIVE | Noted: 2025-03-26

## 2025-03-26 PROCEDURE — 45385 COLONOSCOPY W/LESION REMOVAL: CPT | Performed by: INTERNAL MEDICINE

## 2025-03-26 PROCEDURE — 0DBP8ZX EXCISION OF RECTUM, VIA NATURAL OR ARTIFICIAL OPENING ENDOSCOPIC, DIAGNOSTIC: ICD-10-PCS | Performed by: INTERNAL MEDICINE

## 2025-03-26 DEVICE — REPLAY HEMOSTASIS CLIP, 11MM SPAN
Type: IMPLANTABLE DEVICE | Status: FUNCTIONAL
Brand: REPLAY

## 2025-03-26 RX ORDER — PANTOPRAZOLE SODIUM 40 MG/1
40 TABLET, DELAYED RELEASE ORAL
Qty: 90 TABLET | Refills: 3 | Status: CANCELLED | OUTPATIENT
Start: 2025-03-26

## 2025-03-26 RX ORDER — SODIUM CHLORIDE, SODIUM LACTATE, POTASSIUM CHLORIDE, CALCIUM CHLORIDE 600; 310; 30; 20 MG/100ML; MG/100ML; MG/100ML; MG/100ML
INJECTION, SOLUTION INTRAVENOUS CONTINUOUS
Status: DISCONTINUED | OUTPATIENT
Start: 2025-03-26 | End: 2025-03-26

## 2025-03-26 RX ORDER — SODIUM CHLORIDE, SODIUM LACTATE, POTASSIUM CHLORIDE, CALCIUM CHLORIDE 600; 310; 30; 20 MG/100ML; MG/100ML; MG/100ML; MG/100ML
INJECTION, SOLUTION INTRAVENOUS CONTINUOUS PRN
Status: DISCONTINUED | OUTPATIENT
Start: 2025-03-26 | End: 2025-03-26 | Stop reason: SURG

## 2025-03-26 RX ORDER — ONDANSETRON 2 MG/ML
4 INJECTION INTRAMUSCULAR; INTRAVENOUS ONCE AS NEEDED
Status: DISCONTINUED | OUTPATIENT
Start: 2025-03-26 | End: 2025-03-26

## 2025-03-26 RX ORDER — NALOXONE HYDROCHLORIDE 0.4 MG/ML
0.08 INJECTION, SOLUTION INTRAMUSCULAR; INTRAVENOUS; SUBCUTANEOUS ONCE AS NEEDED
Status: DISCONTINUED | OUTPATIENT
Start: 2025-03-26 | End: 2025-03-26

## 2025-03-26 RX ORDER — LIDOCAINE HYDROCHLORIDE 10 MG/ML
INJECTION, SOLUTION EPIDURAL; INFILTRATION; INTRACAUDAL; PERINEURAL AS NEEDED
Status: DISCONTINUED | OUTPATIENT
Start: 2025-03-26 | End: 2025-03-26 | Stop reason: SURG

## 2025-03-26 RX ADMIN — LIDOCAINE HYDROCHLORIDE 50 MG: 10 INJECTION, SOLUTION EPIDURAL; INFILTRATION; INTRACAUDAL; PERINEURAL at 08:01:00

## 2025-03-26 RX ADMIN — SODIUM CHLORIDE, SODIUM LACTATE, POTASSIUM CHLORIDE, CALCIUM CHLORIDE: 600; 310; 30; 20 INJECTION, SOLUTION INTRAVENOUS at 08:17:00

## 2025-03-26 RX ADMIN — SODIUM CHLORIDE, SODIUM LACTATE, POTASSIUM CHLORIDE, CALCIUM CHLORIDE: 600; 310; 30; 20 INJECTION, SOLUTION INTRAVENOUS at 07:59:00

## 2025-03-26 NOTE — ANESTHESIA POSTPROCEDURE EVALUATION
Patient: Dorys Pickens    Procedure Summary       Date: 03/26/25 Room / Location: [facility] ENDOSCOPY 03 / EM ENDOSCOPY    Anesthesia Start: 0758 Anesthesia Stop: 0823    Procedure: COLONOSCOPY Diagnosis:       Colon cancer screening      (polyp, diverticulosis, hemorrhoids)    Surgeons: GORDO Peguero MD Anesthesiologist: Paulette Mckenna CRNA    Anesthesia Type: MAC ASA Status: 2            Anesthesia Type: MAC    Vitals Value Taken Time   /60 03/26/25 0823   Temp 98 03/26/25 0823   Pulse 82 03/26/25 0823   Resp 14 03/26/25 0823   SpO2 100 03/26/25 0823       [facility] AN Post Evaluation:   Patient Evaluated in PACU  Patient Participation: complete - patient participated  Level of Consciousness: awake  Pain Score: 0  Pain Management: adequate  Airway Patency:patent  Dental exam unchanged from preop  Yes    Nausea/Vomiting: none  Cardiovascular Status: acceptable  Respiratory Status: acceptable  Postoperative Hydration acceptable      Paulette Mckenna CRNA  3/26/2025 8:23 AM

## 2025-03-26 NOTE — DISCHARGE INSTRUCTIONS
Home Care Instructions for Colonoscopy with Sedation    Diet:  - Resume your regular diet as tolerated unless otherwise instructed.  - Start with light meals to minimize bloating.  - Do not drink alcohol today.    Medication:  - If you have questions about resuming your normal medications, please contact your Primary Care Physician.    Activities:  - Take it easy today. Do not return to work today.  - Do not drive today.  - Do not operate any machinery today (including kitchen equipment).    Colonoscopy:  - You may notice some rectal \"spotting\" (a little blood on the toilet tissue) for a day or two after the exam. This is normal.  - If you experience any rectal bleeding (not spotting), persistent tenderness or sharp severe abdominal pains, oral temperature over 100 degrees Fahrenheit, light-headedness or dizziness, or any other problems, contact your doctor.    **If unable to reach your doctor, please go to the [facility] Emergency Room**    - Your referring physician will receive a full report of your examination.  - If you do not hear from your doctor's office within two weeks of your biopsy, please call them for your results.    You may be able to see your laboratory results in Novan between 4 and 7 business days.  In some cases, your physician may not have viewed the results before they are released to Novan.  If you have questions regarding your results contact the physician who ordered the test/exam by phone or via Novan by choosing \"Ask a Medical Question.\"

## 2025-03-26 NOTE — OPERATIVE REPORT
COLONOSCOPY REPORT    Dorys Pickens     1950 Age 74 year old   PCP Cristel Arias MD Endoscopist Ariane Peguero MD     Date of procedure: 25    Procedure: Colonoscopy w/cold snare polypectomy     Pre-operative diagnosis: Screening    Post-operative diagnosis: Polyp(s) of colon, diverticulosis of the colon, internal hemorrhoids    Medications: MAC    Withdrawal time: 10 minutes    Procedure:  Informed consent was obtained from the patient after the risks of the procedure were discussed, including but not limited to bleeding, perforation, aspiration, infection, or possibility of a missed lesion. After discussions of the risks/benefits and alternatives to this procedure, as well as the planned sedation, the patient was placed in the left lateral decubitus position and begun on continuous blood pressure pulse oximetry and EKG monitoring and this was maintained throughout the procedure. Once an adequate level of sedation was obtained a digital rectal exam was completed. Then the lubricated tip of the [id number] diagnostic video colonoscope was inserted and advanced without difficulty to the cecum using the CO2 insufflation technique. The cecum was identified by localizing the trifold, the appendix and the ileocecal valve. Withdrawal was begun with thorough washing and careful examination of the colonic walls and folds. A routine second examination of the cecum/ascending colon was performed. Photodocumentation was obtained. The bowel prep was good. Views of the colon were good with washing. I then carefully withdrew the instrument from the patient who tolerated the procedure well.     Complications: none.    Findings:   1. One polyp(s) noted as follows:      A. 9 mm polyp in the rectum of the colon; sessile morphology; cold snare polypectomy and retrieved.  Persistent oozing from polypectomy site controlled with a single endoclip placement.     2. Diverticulosis: mild in the left  colon.    3. Terminal ileum: the visualized mucosa appeared normal.    4. The colonic mucosa throughout the colon showed normal vascular pattern, without evidence of angioectasias or inflammation.     5. A retroflexed view of the rectum revealed small internal hemorrhoids.    6. BC: normal rectal tone, no masses palpated.     Impression:   One small colon polyp removed.  Diverticulosis and internal hemorrhoids.    Recommend:  Await pathology. The interval for the next colonoscopy will be determined after reviewing pathology. If new signs or symptoms develop, colonoscopy may need to be repeated sooner.   High fiber diet.  Monitor for blood in the stool. If having more than just tinge of blood, call office or go to the ER.    >>>If tissue was obtained and you have not received your pathology results either by phone or letter within 2 weeks, please call our office at [phone].    Specimens: colon  Blood loss: <1 ml      ----------------------------------------------------------------------------------------------------------------------------------    INTERVAL FOR COLONOSCOPY:   - If there is no family history of colon cancer and no colon polyps identified in an adequately prepped colon - colonoscopy should be repeated in 10 years. Various factors should be considered regarding repeat colonoscopy - including co-morbid conditions, ability to tolerate procedure with advanced age, and desire for repeat testing.   - If no colon polyps were identified and a positive family history of colon cancer - the colonoscopy should be repeated in 5 years.   - If colon polyps were removed, the colonoscopy should be repeated sooner depending on size/type/location of polyp.

## 2025-03-26 NOTE — H&P
History & Physical Examination    Patient Name: Dorys Pickens  MRN: [id number]  CSN: [id number]  YOB: 1950    Diagnosis: screening for colon cancer    Prescriptions Prior to Admission[1]  No current facility-administered medications for this encounter.       Allergies: Allergies[2]    Past Medical History:    Arrhythmia    TACHYCARDIA    Asthma (HCC)    Disorder of thyroid    High cholesterol    Hyperlipidemia    Osteoarthritis    JOINT AND SPINE    Visual impairment    GLASSES     Past Surgical History:   Procedure Laterality Date    Cholecystectomy      Egd N/A 11/01/2024    Dr. Peguero     History reviewed. No pertinent family history.  Social History     Tobacco Use    Smoking status: Every Day     Current packs/day: 0.50     Average packs/day: 0.5 packs/day for 57.7 years (28.9 ttl pk-yrs)     Types: Cigarettes     Start date: 6/26/1967    Smokeless tobacco: Never   Substance Use Topics    Alcohol use: No       SYSTEM Check if Review is Normal Check if Physical Exam is Normal If not normal, please explain:   HEENT [X ] [ X]    NECK  [X ] [ X]    HEART [X ] [ X]    LUNGS [X ] [ X]    ABDOMEN [X ] [ X]    EXTREMITIES [X ] [ X]    OTHER        I have discussed the risks and benefits and alternatives of the procedure with the patient/family.  They understand and agree to proceed with plan of care.   I have reviewed the History and Physical done within the last 30 days.  Any changes noted above.    NUNO Peguero MD  [facility] - Gastroenterology  3/26/2025  7:59 AM                 [1]   Medications Prior to Admission   Medication Sig Dispense Refill Last Dose/Taking    levothyroxine 88 MCG Oral Tab Take 1 tablet (88 mcg total) by mouth before breakfast. 90 tablet 3 3/25/2025    pantoprazole 40 MG Oral Tab EC Take 1 tablet (40 mg total) by mouth every morning before breakfast. 90 tablet 3 3/25/2025    zolpidem 10 MG Oral Tab Take 1 tablet (10 mg total) by mouth nightly as needed. 30 tablet 5  3/24/2025    albuterol 108 (90 Base) MCG/ACT Inhalation Aero Soln Inhale 2 puffs into the lungs every 4 (four) hours as needed for Wheezing. 18 g 3 Taking As Needed    propranolol 20 MG Oral Tab Take 1 tablet (20 mg total) by mouth 2 (two) times daily. 180 tablet 3 3/25/2025    ergocalciferol 1.25 MG (79140 UT) Oral Cap Take 1 capsule (50,000 Units total) by mouth once a week. 12 capsule 3 3/24/2025    simvastatin 10 MG Oral Tab Take 1 tablet (10 mg total) by mouth nightly. 90 tablet 3 3/25/2025 at  8:00 AM    ALENDRONATE 70 MG Oral Tab TAKE 1 TABLET BY MOUTH EVERY WEEK (Patient not taking: Reported on 3/24/2025) 12 tablet 3 Not Taking    fluticasone propionate 50 MCG/ACT Nasal Suspension 2 sprays by Nasal route daily. (Patient not taking: Reported on 3/24/2025) 16 g 5 Not Taking    polyethylene glycol, PEG 3350-KCl-NaBcb-NaCl-NaSulf, 236 g Oral Recon Soln Take 4,000 mL by mouth As Directed. Take 2,000 mL the night before your procedure and 2,000 mL the morning of your procedure. (Patient not taking: Reported on 3/24/2025) 1 each 0 Not Taking    gabapentin 100 MG Oral Cap Take 1 capsule (100 mg total) by mouth nightly. (Patient not taking: Reported on 3/24/2025) 30 capsule 3 Not Taking    nystatin-triamcinolone 886051-4.1 UNIT/GM-% External Cream Topically bid (Patient not taking: Reported on 3/24/2025) 1 Tube 3 Not Taking    clotrimazole-betamethasone 1-0.05 % External Cream Topically bid (Patient not taking: Reported on 3/24/2025) 60 g 3 Not Taking   [2]   Allergies  Allergen Reactions    Mushrooms ANAPHYLAXIS    Pcn [Penicillins] SHORTNESS OF BREATH     Pt tolerated ceftriaxone in the ED on 10/29/24    Amoxicillin     Sulfa Antibiotics

## 2025-03-26 NOTE — ANESTHESIA PREPROCEDURE EVALUATION
Anesthesia PreOp Note    HPI:     Dorys Pickens is a 74 year old female who presents for preoperative consultation requested by: GORDO Peguero MD    Date of Surgery: 3/26/2025    Procedure(s):  COLONOSCOPY  Indication: Colon cancer screening    Relevant Problems   No relevant active problems       NPO:  Last Liquid Consumption Date: 03/26/25  Last Liquid Consumption Time: 0500  Last Solid Consumption Date: 03/25/25  Last Solid Consumption Time: 1200  Last Liquid Consumption Date: 03/26/25          History Review:  Patient Active Problem List    Diagnosis Date Noted    Small bowel diverticular disease 11/01/2024    Perforated diverticulum of duodenum 10/29/2024    Duodenal ulcer with perforation (HCC) 10/29/2024    Perforated duodenal ulcer (HCC) 10/29/2024    Pain in joint of right shoulder 09/18/2019    Tendinitis of right shoulder 09/18/2019    Contusion of right chest wall 01/18/2019    Osteoporosis 05/20/2015       Past Medical History:    Arrhythmia    TACHYCARDIA    Asthma (HCC)    Disorder of thyroid    High cholesterol    Hyperlipidemia    Osteoarthritis    JOINT AND SPINE    Visual impairment    GLASSES       Past Surgical History:   Procedure Laterality Date    Cholecystectomy      Egd N/A 11/01/2024    Dr. Peguero       Prescriptions Prior to Admission[1]  Current Medications and Prescriptions Ordered in Epic[2]    Allergies[3]    History reviewed. No pertinent family history.  Social History     Socioeconomic History    Marital status:    Tobacco Use    Smoking status: Every Day     Current packs/day: 0.50     Average packs/day: 0.5 packs/day for 57.7 years (28.9 ttl pk-yrs)     Types: Cigarettes     Start date: 6/26/1967    Smokeless tobacco: Never   Vaping Use    Vaping status: Never Used   Substance and Sexual Activity    Alcohol use: No    Drug use: No    Sexual activity: Not Currently       Available pre-op labs reviewed.             Vital Signs:  Body mass index is 26 kg/m².   height is  1.568 m (5' 1.75\") and weight is 64 kg (141 lb).   Vitals:    03/24/25 1207   Weight: 64 kg (141 lb)   Height: 1.568 m (5' 1.75\")        Anesthesia Evaluation     Patient summary reviewed and Nursing notes reviewed    Airway   Mallampati: II  TM distance: >3 FB  Neck ROM: full  Dental    (+) upper dentures and lower dentures    Pulmonary - normal exam   Cardiovascular - negative ROS and normal exam    Neuro/Psych      GI/Hepatic/Renal    (+) bowel prep    Endo/Other - negative ROS   Abdominal  - normal exam                 Anesthesia Plan:   ASA:  2  Plan:   MAC  Informed Consent Plan and Risks Discussed With:  Patient  Use of Blood Products Discussed With:  Patient  Discussed plan with:  CRNA      I have informed Dorys TERRI Nelia and/or legal guardian or family member of the nature of the anesthetic plan, benefits, risks including possible dental damage if relevant, major complications, and any alternative forms of anesthetic management.   All of the patient's questions were answered to the best of my ability. The patient desires the anesthetic management as planned.  Paulette Mckenna CRNA  3/26/2025 7:24 AM  Present on Admission:  **None**           [1]   Medications Prior to Admission   Medication Sig Dispense Refill Last Dose/Taking    levothyroxine 88 MCG Oral Tab Take 1 tablet (88 mcg total) by mouth before breakfast. 90 tablet 3 Taking    pantoprazole 40 MG Oral Tab EC Take 1 tablet (40 mg total) by mouth every morning before breakfast. 90 tablet 3 Taking    zolpidem 10 MG Oral Tab Take 1 tablet (10 mg total) by mouth nightly as needed. 30 tablet 5 Taking As Needed    albuterol 108 (90 Base) MCG/ACT Inhalation Aero Soln Inhale 2 puffs into the lungs every 4 (four) hours as needed for Wheezing. 18 g 3 Taking As Needed    propranolol 20 MG Oral Tab Take 1 tablet (20 mg total) by mouth 2 (two) times daily. 180 tablet 3 Taking    ergocalciferol 1.25 MG (34495 UT) Oral Cap Take 1 capsule (50,000 Units total) by  mouth once a week. 12 capsule 3 Taking    simvastatin 10 MG Oral Tab Take 1 tablet (10 mg total) by mouth nightly. 90 tablet 3 Taking    ALENDRONATE 70 MG Oral Tab TAKE 1 TABLET BY MOUTH EVERY WEEK (Patient not taking: Reported on 3/24/2025) 12 tablet 3 Not Taking    fluticasone propionate 50 MCG/ACT Nasal Suspension 2 sprays by Nasal route daily. (Patient not taking: Reported on 3/24/2025) 16 g 5 Not Taking    polyethylene glycol, PEG 3350-KCl-NaBcb-NaCl-NaSulf, 236 g Oral Recon Soln Take 4,000 mL by mouth As Directed. Take 2,000 mL the night before your procedure and 2,000 mL the morning of your procedure. (Patient not taking: Reported on 3/24/2025) 1 each 0 Not Taking    gabapentin 100 MG Oral Cap Take 1 capsule (100 mg total) by mouth nightly. (Patient not taking: Reported on 3/24/2025) 30 capsule 3 Not Taking    nystatin-triamcinolone 615551-1.1 UNIT/GM-% External Cream Topically bid (Patient not taking: Reported on 3/24/2025) 1 Tube 3 Not Taking    clotrimazole-betamethasone 1-0.05 % External Cream Topically bid (Patient not taking: Reported on 3/24/2025) 60 g 3 Not Taking   [2]   No current Epic-ordered facility-administered medications on file.     No current Epic-ordered outpatient medications on file.   [3]   Allergies  Allergen Reactions    Mushrooms ANAPHYLAXIS    Pcn [Penicillins] SHORTNESS OF BREATH     Pt tolerated ceftriaxone in the ED on 10/29/24    Amoxicillin     Sulfa Antibiotics

## 2025-03-27 RX ORDER — CLOTRIMAZOLE AND BETAMETHASONE DIPROPIONATE 10; .64 MG/G; MG/G
CREAM TOPICAL
Qty: 60 G | Refills: 3 | Status: SHIPPED | OUTPATIENT
Start: 2025-03-27

## 2025-04-03 RX ORDER — PANTOPRAZOLE SODIUM 40 MG/1
40 TABLET, DELAYED RELEASE ORAL
Qty: 120 TABLET | Refills: 0 | OUTPATIENT
Start: 2025-04-03

## 2025-04-03 NOTE — TELEPHONE ENCOUNTER
Requested Prescriptions     Pending Prescriptions Disp Refills    PANTOPRAZOLE 40 MG Oral Tab EC [Pharmacy Med Name: PANTOPRAZOLE 40MG TABLETS] 120 tablet 0     Sig: TAKE 1 TABLET(40 MG) BY MOUTH TWICE DAILY BEFORE MEALS       LOV: 12/16/2024  LR: 03/27/2025  Last Colonoscopy: 03/26/2025  gb

## 2025-04-10 ENCOUNTER — OFFICE VISIT (OUTPATIENT)
Dept: INTERNAL MEDICINE CLINIC | Facility: CLINIC | Age: 75
End: 2025-04-10
Payer: MEDICAID

## 2025-04-10 VITALS
DIASTOLIC BLOOD PRESSURE: 70 MMHG | BODY MASS INDEX: 26.13 KG/M2 | HEIGHT: 61.75 IN | WEIGHT: 142 LBS | SYSTOLIC BLOOD PRESSURE: 130 MMHG

## 2025-04-10 DIAGNOSIS — M62.830 SPASM OF LEFT TRAPEZIUS MUSCLE: ICD-10-CM

## 2025-04-10 DIAGNOSIS — E03.9 ACQUIRED HYPOTHYROIDISM: ICD-10-CM

## 2025-04-10 DIAGNOSIS — A04.71 RECURRENT COLITIS DUE TO CLOSTRIDIOIDES DIFFICILE: ICD-10-CM

## 2025-04-10 DIAGNOSIS — K27.9 PEPTIC ULCER DISEASE: Primary | ICD-10-CM

## 2025-04-10 DIAGNOSIS — E78.2 MIXED HYPERLIPIDEMIA: ICD-10-CM

## 2025-04-10 PROCEDURE — 99214 OFFICE O/P EST MOD 30 MIN: CPT | Performed by: INTERNAL MEDICINE

## 2025-04-10 RX ORDER — FAMOTIDINE 40 MG/1
40 TABLET, FILM COATED ORAL 2 TIMES DAILY
Qty: 60 TABLET | Refills: 11 | Status: SHIPPED | OUTPATIENT
Start: 2025-04-10

## 2025-04-10 NOTE — PROGRESS NOTES
Subjective:   Dorys Pickens is a 74 year old female who presents for Physical     Patient here for a fu on peptic ulcer disease  with recent hospitalization for perforated ulcer.  Also has had recurrent c diff colitis.    History/Other:    Chief Complaint Reviewed and Verified  No Further Nursing Notes to   Review  Medications Reviewed  Problem List Reviewed         Tobacco: 5-10 a day  Tobacco Use[1]  E-Cigarettes/Vaping       Questions Responses    E-Cigarette Use Never User           Tobacco cessation counseling for 3-10 minutes (add E/M code #80292).      Current Medications[2]      Review of Systems:  Review of Systems   Respiratory:  Negative for shortness of breath.    Cardiovascular:  Negative for chest pain, palpitations and leg swelling.   Gastrointestinal: Negative.  Negative for abdominal pain.   Endocrine:        Hypothyroidism   Musculoskeletal:  Positive for arthralgias and myalgias (neck spasm).   Neurological:  Negative for numbness.         Objective:   /70   Ht 5' 1.75\" (1.568 m)   Wt 142 lb (64.4 kg)   BMI 26.18 kg/m²  Estimated body mass index is 26.18 kg/m² as calculated from the following:    Height as of this encounter: 5' 1.75\" (1.568 m).    Weight as of this encounter: 142 lb (64.4 kg).  Physical Exam  Constitutional:       Appearance: She is normal weight.   HENT:      Head: Normocephalic and atraumatic.   Eyes:      General: No scleral icterus.     Pupils: Pupils are equal, round, and reactive to light.   Neck:      Comments: Trapezius spasm  Cardiovascular:      Rate and Rhythm: Normal rate and regular rhythm.   Pulmonary:      Effort: Pulmonary effort is normal.      Breath sounds: Normal breath sounds.   Abdominal:      Palpations: Abdomen is soft.      Tenderness: There is no abdominal tenderness.   Musculoskeletal:         General: Tenderness (trapezius) present.      Right lower leg: No edema.      Left lower leg: No edema.   Neurological:      Mental Status: She is  alert. Mental status is at baseline.   Psychiatric:         Thought Content: Thought content normal.           Assessment & Plan:   1. Peptic ulcer disease (Primary)  Pepcid 40 mg b id  2. 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  Traeius spasm - PT referral      No follow-ups on file.    Cristel Arias MD, 4/10/2025, 1:47 PM        [1]   Social History  Tobacco Use   Smoking Status Every Day    Current packs/day: 0.50    Average packs/day: 0.5 packs/day for 57.8 years (28.9 ttl pk-yrs)    Types: Cigarettes    Start date: 6/26/1967   Smokeless Tobacco Never   [2]   Current Outpatient Medications   Medication Sig Dispense Refill    omeprazole 20 MG Oral Capsule Delayed Release Take 1 capsule (20 mg total) by mouth every morning before breakfast. 90 capsule 3    calcium carbonate-vitamin D 250-3.125 MG-MCG Oral Tab Take 1 tablet by mouth 2 (two) times daily with meals. 60 tablet 11    clotrimazole-betamethasone 1-0.05 % External Cream Topically bid 60 g 3    ALENDRONATE 70 MG Oral Tab TAKE 1 TABLET BY MOUTH EVERY WEEK (Patient not taking: Reported on 3/24/2025) 12 tablet 3    levothyroxine 88 MCG Oral Tab Take 1 tablet (88 mcg total) by mouth before breakfast. 90 tablet 3    zolpidem 10 MG Oral Tab Take 1 tablet (10 mg total) by mouth nightly as needed. 30 tablet 5    albuterol 108 (90 Base) MCG/ACT Inhalation Aero Soln Inhale 2 puffs into the lungs every 4 (four) hours as needed for Wheezing. 18 g 3    fluticasone propionate 50 MCG/ACT Nasal Suspension 2 sprays by Nasal route daily. (Patient not taking: Reported on 3/24/2025) 16 g 5    propranolol 20 MG Oral Tab Take 1 tablet (20 mg total) by mouth 2 (two) times daily. 180 tablet 3    polyethylene glycol, PEG 3350-KCl-NaBcb-NaCl-NaSulf, 236 g Oral Recon Soln Take 4,000 mL by mouth As Directed. Take 2,000 mL the night before your procedure and 2,000 mL the morning of your procedure. (Patient not taking: Reported on 3/24/2025) 1 each 0     ergocalciferol 1.25 MG (80260 UT) Oral Cap Take 1 capsule (50,000 Units total) by mouth once a week. 12 capsule 3    simvastatin 10 MG Oral Tab Take 1 tablet (10 mg total) by mouth nightly. 90 tablet 3    gabapentin 100 MG Oral Cap Take 1 capsule (100 mg total) by mouth nightly. (Patient not taking: Reported on 3/24/2025) 30 capsule 3    nystatin-triamcinolone 079112-2.1 UNIT/GM-% External Cream Topically bid (Patient not taking: Reported on 3/24/2025) 1 Tube 3

## 2025-04-22 NOTE — PROGRESS NOTES
I mailed out letter to patient   Updated the health maintenance and patient outreach      Last CLN done: 03/26/2025    GORDO Peguero MD  [facility]  No recall

## 2025-06-11 RX ORDER — SIMVASTATIN 10 MG
10 TABLET ORAL NIGHTLY
Qty: 90 TABLET | Refills: 0 | Status: SHIPPED | OUTPATIENT
Start: 2025-06-11

## 2025-07-10 ENCOUNTER — TELEPHONE (OUTPATIENT)
Age: 75
End: 2025-07-10

## 2025-07-10 NOTE — TELEPHONE ENCOUNTER
Cze??,Przepraszam, ?e Ci? rachna zasta?em - Kontaktuj? si? z dzia?em nawigacji w zakresie zdrowia [facility] [place], jm nawi?za? kontakt w sprawie zamówienia z biura [address] d[address]aw[address], jm pomóc w po??czeniu Ci? z zasobami opieki. Je?li chcia?by? to omówi?, prosimy o oddzwonienie pod numer [phone], lub w przypadku blanche kauffman mo?esz randaltowa? si? z nasz? ca?odobow? infolini? pod numerem [phone]. Czek[address] sal[address]on od [address] w[address].

## 2025-07-15 ENCOUNTER — LAB ENCOUNTER (OUTPATIENT)
Dept: LAB | Age: 75
End: 2025-07-15
Attending: INTERNAL MEDICINE
Payer: MEDICAID

## 2025-07-15 DIAGNOSIS — E78.2 MIXED HYPERLIPIDEMIA: ICD-10-CM

## 2025-07-15 DIAGNOSIS — K27.9 PEPTIC ULCER DISEASE: ICD-10-CM

## 2025-07-15 DIAGNOSIS — E03.9 ACQUIRED HYPOTHYROIDISM: ICD-10-CM

## 2025-07-15 LAB
ALBUMIN SERPL-MCNC: 4.4 G/DL (ref 3.2–4.8)
ALBUMIN/GLOB SERPL: 1.9 {RATIO} (ref 1–2)
ALP LIVER SERPL-CCNC: 78 U/L (ref 55–142)
ALT SERPL-CCNC: 9 U/L (ref 10–49)
ANION GAP SERPL CALC-SCNC: 7 MMOL/L (ref 0–18)
AST SERPL-CCNC: 13 U/L (ref ?–34)
BASOPHILS # BLD AUTO: 0.06 X10(3) UL (ref 0–0.2)
BASOPHILS NFR BLD AUTO: 0.8 %
BILIRUB SERPL-MCNC: 0.4 MG/DL (ref 0.2–1.1)
BUN BLD-MCNC: 11 MG/DL (ref 9–23)
BUN/CREAT SERPL: 14.9 (ref 10–20)
CALCIUM BLD-MCNC: 9.2 MG/DL (ref 8.7–10.4)
CHLORIDE SERPL-SCNC: 107 MMOL/L (ref 98–112)
CHOLEST SERPL-MCNC: 160 MG/DL (ref ?–200)
CO2 SERPL-SCNC: 29 MMOL/L (ref 21–32)
CREAT BLD-MCNC: 0.74 MG/DL (ref 0.55–1.02)
DEPRECATED RDW RBC AUTO: 43.7 FL (ref 35.1–46.3)
EGFRCR SERPLBLD CKD-EPI 2021: 85 ML/MIN/1.73M2 (ref 60–?)
EOSINOPHIL # BLD AUTO: 0.06 X10(3) UL (ref 0–0.7)
EOSINOPHIL NFR BLD AUTO: 0.8 %
ERYTHROCYTE [DISTWIDTH] IN BLOOD BY AUTOMATED COUNT: 12.8 % (ref 11–15)
FASTING PATIENT LIPID ANSWER: YES
FASTING STATUS PATIENT QL REPORTED: YES
GLOBULIN PLAS-MCNC: 2.3 G/DL (ref 2–3.5)
GLUCOSE BLD-MCNC: 104 MG/DL (ref 70–99)
HCT VFR BLD AUTO: 44.6 % (ref 35–48)
HDLC SERPL-MCNC: 45 MG/DL (ref 40–59)
HGB BLD-MCNC: 14.6 G/DL (ref 12–16)
IMM GRANULOCYTES # BLD AUTO: 0.03 X10(3) UL (ref 0–1)
IMM GRANULOCYTES NFR BLD: 0.4 %
LDLC SERPL CALC-MCNC: 84 MG/DL (ref ?–100)
LYMPHOCYTES # BLD AUTO: 1.92 X10(3) UL (ref 1–4)
LYMPHOCYTES NFR BLD AUTO: 25.9 %
MCH RBC QN AUTO: 30 PG (ref 26–34)
MCHC RBC AUTO-ENTMCNC: 32.7 G/DL (ref 31–37)
MCV RBC AUTO: 91.8 FL (ref 80–100)
MONOCYTES # BLD AUTO: 0.47 X10(3) UL (ref 0.1–1)
MONOCYTES NFR BLD AUTO: 6.3 %
NEUTROPHILS # BLD AUTO: 4.87 X10 (3) UL (ref 1.5–7.7)
NEUTROPHILS # BLD AUTO: 4.87 X10(3) UL (ref 1.5–7.7)
NEUTROPHILS NFR BLD AUTO: 65.8 %
NONHDLC SERPL-MCNC: 115 MG/DL (ref ?–130)
OSMOLALITY SERPL CALC.SUM OF ELEC: 296 MOSM/KG (ref 275–295)
PLATELET # BLD AUTO: 262 10(3)UL (ref 150–450)
POTASSIUM SERPL-SCNC: 4.9 MMOL/L (ref 3.5–5.1)
PROT SERPL-MCNC: 6.7 G/DL (ref 5.7–8.2)
RBC # BLD AUTO: 4.86 X10(6)UL (ref 3.8–5.3)
SODIUM SERPL-SCNC: 143 MMOL/L (ref 136–145)
T4 FREE SERPL-MCNC: 1.7 NG/DL (ref 0.8–1.7)
TRIGL SERPL-MCNC: 181 MG/DL (ref 30–149)
TSI SER-ACNC: 0.38 UIU/ML (ref 0.55–4.78)
VLDLC SERPL CALC-MCNC: 29 MG/DL (ref 0–30)
WBC # BLD AUTO: 7.4 X10(3) UL (ref 4–11)

## 2025-07-15 PROCEDURE — 36415 COLL VENOUS BLD VENIPUNCTURE: CPT

## 2025-07-15 PROCEDURE — 80053 COMPREHEN METABOLIC PANEL: CPT

## 2025-07-15 PROCEDURE — 84439 ASSAY OF FREE THYROXINE: CPT

## 2025-07-15 PROCEDURE — 84443 ASSAY THYROID STIM HORMONE: CPT

## 2025-07-15 PROCEDURE — 85025 COMPLETE CBC W/AUTO DIFF WBC: CPT

## 2025-07-15 PROCEDURE — 80061 LIPID PANEL: CPT

## 2025-08-11 ENCOUNTER — TELEPHONE (OUTPATIENT)
Age: 75
End: 2025-08-11

## (undated) DEVICE — KIT CLEAN ENDOKIT 1.1OZ GOWNX2

## (undated) DEVICE — Device

## (undated) DEVICE — LASSO POLYPECTOMY SNARE: Brand: LASSO

## (undated) DEVICE — MEDI-VAC NON-CONDUCTIVE SUCTION TUBING 6MM X 1.8M (6FT.) L: Brand: CARDINAL HEALTH

## (undated) DEVICE — SYRINGE, LUER SLIP, STERILE, 60ML: Brand: MEDLINE

## (undated) DEVICE — KIT ENDO ORCAPOD 160/180/190

## (undated) DEVICE — YANKAUER,BULB TIP,W/O VENT,RIGID,STERILE: Brand: MEDLINE

## (undated) DEVICE — MEDI-VAC NON-CONDUCTIVE SUCTION TUBING: Brand: CARDINAL HEALTH

## (undated) DEVICE — 60 ML SYRINGE REGULAR TIP: Brand: MONOJECT

## (undated) DEVICE — GIJAW SINGLE-USE BIOPSY FORCEPS WITH NEEDLE: Brand: GIJAW

## (undated) DEVICE — V2 SPECIMEN COLLECTION MANIFOLD KIT: Brand: NEPTUNE

## (undated) DEVICE — CONMED SCOPE SAVER BITE BLOCK, 20X27 MM: Brand: SCOPE SAVER

## (undated) DEVICE — TRAP POLYP W/ 2 SPEC TY CLR MAGNIFYING WIND

## (undated) DEVICE — CO2 CANNULA,SSOFT,ADLT,7O2,4CO2,FEMALE: Brand: MEDLINE

## (undated) NOTE — LETTER
1/18/2019              [place] [id number]       To whom it may concern:    Please excuse Richard Hinkle from work for half the day today.  Her mother, Prince Cabrera, is a patient under my medical care and was

## (undated) NOTE — LETTER
[facility]  [address], [place], [place]    Authorization for Surgical Operation and Procedure                               I hereby authorize GORDO Peguero MD, my physician and his/her assistants (if applicable), which may include medical students, residents, and/or fellows, to perform the following surgical operation/ procedure and administer such anesthesia as may be determined necessary by my physician: Operation/Procedure name (s) COLONOSCOPY on Dorys Pickens   2.   I recognize that during the surgical operation/procedure, unforeseen conditions may necessitate additional or different procedures than those listed above.  I, therefore, further authorize and request that the above-named surgeon, assistants, or designees perform such procedures as are, in their judgment, necessary and desirable.    3.   My surgeon/physician has discussed prior to my surgery the potential benefits, risks and side effects of this procedure; the likelihood of achieving goals; and potential problems that might occur during recuperation.  They also discussed reasonable alternatives to the procedure, including risks, benefits, and side effects related to the alternatives and risks related to not receiving this procedure.  I have had all my questions answered and I acknowledge that no guarantee has been made as to the result that may be obtained.    4.   Should the need arise during my operation/procedure, which includes change of level of care prior to discharge, I also consent to the administration of blood and/or blood products.  Further, I understand that despite careful testing and screening of blood or blood products by collecting agencies, I may still be subject to ill effects as a result of receiving a blood transfusion and/or blood products.  The following are some, but not all, of the potential risks that can occur: fever and allergic reactions, hemolytic reactions, transmission of diseases such as  Hepatitis, AIDS and Cytomegalovirus (CMV) and fluid overload.  In the event that I wish to have an autologous transfusion of my own blood, or a directed donor transfusion, I will discuss this with my physician.  Check only if Refusing Blood or Blood Products  I understand refusal of blood or blood products as deemed necessary by my physician may have serious consequences to my condition to include possible death. I hereby assume responsibility for my refusal and release the hospital, its personnel, and my physicians from any responsibility for the consequences of my refusal.    o  Refuse   5.   I authorize the use of any specimen, organs, tissues, body parts or foreign objects that may be removed from my body during the operation/procedure for diagnosis, research or teaching purposes and their subsequent disposal by hospital authorities.  I also authorize the release of specimen test results and/or written reports to my treating physician on the hospital medical staff or other referring or consulting physicians involved in my care, at the discretion of the Pathologist or my treating physician.    6.   I consent to the photographing or videotaping of the operations or procedures to be performed, including appropriate portions of my body for medical, scientific, or educational purposes, provided my identity is not revealed by the pictures or by descriptive texts accompanying them.  If the procedure has been photographed/videotaped, the surgeon will obtain the original picture, image, videotape or CD.  The hospital will not be responsible for storage, release or maintenance of the picture, image, tape or CD.    7.   I consent to the presence of a  or observers in the operating room as deemed necessary by my physician or their designees.    8.   I recognize that in the event my procedure results in extended X-Ray/fluoroscopy time, I may develop a skin reaction.    9. If I have a Do Not Attempt  Resuscitation (DNAR) order in place, that status will be suspended while in the operating room, procedural suite, and during the recovery period unless otherwise explicitly stated by me (or a person authorized to consent on my behalf). The surgeon or my attending physician will determine when the applicable recovery period ends for purposes of reinstating the DNAR order.  10. Patients having a sterilization procedure: I understand that if the procedure is successful the results will be permanent and it will therefore be impossible for me to inseminate, conceive, or bear children.  I also understand that the procedure is intended to result in sterility, although the result has not been guaranteed.   11. I acknowledge that my physician has explained sedation/analgesia administration to me including the risk and benefits I consent to the administration of sedation/analgesia as may be necessary or desirable in the judgment of my physician.    I CERTIFY THAT I HAVE READ AND FULLY UNDERSTAND THE ABOVE CONSENT TO OPERATION and/or OTHER PROCEDURE.     ____________________________________  _________________________________        ______________________________  Signature of Patient    Signature of Responsible Person                Printed Name of Responsible Person                                      ____________________________________  _____________________________                ________________________________  Signature of Witness        Date  Time         Relationship to Patient    STATEMENT OF PHYSICIAN My signature below affirms that prior to the time of the procedure; I have explained to the patient and/or his/her legal representative, the risks and benefits involved in the proposed treatment and any reasonable alternative to the proposed treatment. I have also explained the risks and benefits involved in refusal of the proposed treatment and alternatives to the proposed treatment and have answered the patient's  questions. If I have a significant financial interest in a co-management agreement or a significant financial interest in any product or implant, or other significant relationship used in this procedure/surgery, I have disclosed this and had a discussion with my patient.     _____________________________________________________              _____________________________  (Signature of Physician)                                                                                         (Date)                                   (Time)  Patient Name: Dorys Pickens      : 1950      Printed: 3/21/2025     Medical Record #: [id number]                                      Page 1 of 1

## (undated) NOTE — LETTER
[facility] [address] [place]  [address]  Upowa?nienie dla potrzeb operacji i zabiegu chirurgicznego       Date of Surgery: 11/1/2024                                       Upowa?nia Surgeon(s):  GORDO Peguero MD mojego lekarza i asystenta do wykonania nast?puj?cej operacji / zabiegu chirurgicznego oraz przeprowadzenia znieczulenia, które mo?e by? uznane za konieczne przez bianca morris: Nazwa operacji / zabiegu  ESOPHAGOGASTRODUODENOSCOPY (EGD) w przypadku Dorys I Fejkiel   Przyjmuj? do wiadomo?ci, ?e podczas operacji / zabiegu chirurgicznego nieprzewidziane stany mog? wymaga? dodatkowych lub innych zabiegów ni? te wypisane powy?ej.  W zwi?zku z tym upowa?niam i prosz? wy?ej wymienionego chirurga, asystentów lub osoby wyznaczone o wykonanie takich zabiegów, które ich zdaniem s? konieczne i po??kalia.    Mój chirurg / lekarz omówi? ze mn? potencjalne korzy?ci, ryzyko i fozia uboczne tego zabiegu przez operacj?; prawdopodobie?stwo osi?gni?shelbi celu; oraz potencjalne problemy, które mog? wyst?pi? w trakcie rekonwalescencji.  Omówi? ze mn? marissa?e rozs?dne alternatywy zabiegu, w tym ryzyko, korzy?ci oraz fozia uboczne zwi?kendal z alternatywami i ryzyko zwi?kendal z nieprzeprowadzeniem tego zabiegu.  Odpowiedziano na wszystkie moje pytania i potwierdzam, ?e rachna udzielono mi ?adnej gwarancji w zwi?zku z wynikiem, który mo?e zosta? uzyskany.    Je?li zajdzie godwin potrzeba w trakcie operacji lub bezpo?rednio po niej, wyra?am równie? zgod? na podawanie krwi i/lub produktów krwiopochodnych.  Ponadto rozumiem, ?e pomimo starannego testowania i badania krwi lub produktów krwiopochodnych przez podmioty pobieraj?ce, grzegorz mog? by? vidal?alex(-a) na negatywne skutki w wyniku podawania krwi i/lub produktów krwiopochodnych.  Poni?ej przedstawiono niektóre potencjalne zagro?enia, które mog? wyst?pi?: gor?czka i reakviraj alergiczne, remervin hemolityczne, przenoszenie chorób takich prince zapalenie w?troby, AIDS i wirus  cytomegalii (CMV) oraz przeci??enie p?ynowe.  W przypadku, gdy b?d? chcia?(a) przeprowadzi? autologiczn? transfuzj? w?asnej krwi lub transfuzj? od ukierunkowanego dawcy.  Omówi? t? kwesti? ze swoim lekarzem.   Check only if Refusing Blood or Blood Products  I understand refusal of blood or blood products as deemed necessary by my physician may have serious consequences to my condition to include possible death. I hereby assume responsibility for my refusal and release the hospital, its personnel, and my physicians from any responsibility for the consequences of my refusal.           Refuse Refuse        Upowa?niam do wykorzystywania wszelkich próbek, organów, tkanek, cz??ci shelbi?a lub shelbi? obcych, które mog? zosta? usuni?te z mojego shelbi?a w trakcie operacji/zabiegu w celach diagnostycznych, badawczych lub edukacyjnych oraz ich pó?niejszego usuni?shelbi przez w?adze szpitala.  Upowa?niam równie? do udost?pnienia wyników bada? próbek i/lub pisemnych raportów mojemu lekarzowi prowadz?cemu z personelu szpitalnego lub innym lekarzom kieruj?cym lub konsultuj?cym zaanga?owanym w moj? opiek?, wed?ug uznania patologia lub mojego lekarza prowadz?cego.    Wyra?am zgod? na fotografowanie lub nagrywanie wykonywanych operacji lub zabiegów, w tym odpowiednich cz??ci mojego shelbi?a w celach medycznych, naukowych lub edukacyjnych, pod warunkiem nieujawnienia mojej to?samo?ci przez zdj?shelbi lub opisy im towarzysz?ce.  Je?li zabieg zosta? sfotografowany/nagrany, chirurg uzyska oryginalne zdj?cie, obraz, nagranie lub CD.  Szpital rachna b?dzie odpowiedzialny za przechowywanie, ujawnianie lub zarz?dzanie zdj?ciem, obrazem, nagraniem lub CD.    Wyra?am zgod? na obecno?? specjalisty ds. produktów lub obserwatorów na sali operacyjnej, je?li mój lekarz lub osoby przez niego wyznaczone uznaj? to za konieczne.    Zdaj? sobie spraw?, ?e w przypadku, gdy mój zabieg powoduje wyd?u?enie czasu prze?wietlenia / fluoroskopii, mo?e royesy?? si? reakcja na  mojej skórze.    Je?li podpisa?em(-am) deklaracj? DNAR, status ten zostanie zawieszony na Sali operacyjnej, w gabinecie zabiegowym oraz w trakcie okresu rekonwalescencji, chyba ?e wyra?rachna zaznacz? (lub osoba upowa?niona do wyra?enia zgody w moim imieniu). Chirurg lub mój lekarz prowadz?cy okre?li, kiedy zako?czy si? odpowiedni okres rekonwalescencji w celu przywrócenia obowi?zywania deklaracji DNAR.    Imi? i erasmo corderonta:  Dorsy Pickens  Data urodzenia: 12/24/1950              Bonita literami: [id number]    Numer historii choroby: November 1, 2024           Strona: 2 z 3  Pacjenci poddani zabiegowi sterylizacji: Rozumiem, ?e po udanym zabiegu wyniki b?d? trwa?e i w zwi?zku z tym rachna b?dzie mo?liwe zap?odnienie, pocz?cie lub urodzenie dzieci.  Rozumiem marissa?e, ?e zabieg ma na celu doprowadzenie do sterylizacji, chocia? wynik rachna mo?e zosta? zagwarantowany.     Przyjmuj? do wiadomo?ci, ?e mój lekarz wyja?ni? mi podawanie ?rodków uspokajaj?cych / przeciwbólowych, w tym ryzyko i korzy?ci, na które wyra?am zgod? w przypadku podania ?rodków uspokajaj?cych / przeciwbólowych, co mo?e by? konieczne lub po??kalia w ocenie mojego lekarza.     CALEB, ?E PRZECZYTA?EM(-AM) I W PE?NI ROZUMIEM POWY?SZ? ZGOD? NA OPERACJ? i/lub INNY ZASYEDEG.         ________________________________________  __________________________________  Podpis pacjenta      Podpis osoby odpowiedzialnej           ___________________________________         Imi i nazwisko osoby odpowikiranalnej           ___________________________________                 Relacja z pacjentem    _________________________________________  ______________ ________________  Podpis wiadka            Data                    Godzina     Imi? i erasmo van:  Dorys Pickens  Data urodzenia: 12/24/1950              Marcosowanymi literami: [id number]  Numer historii choroby: November 1, 2024           Strona: 2 z 3      [address]   [id number]  Zgoda na znieczulenie    1. Ja, Dorys I Fejkiel, wyra?am zgod? na opiek? anestezjologa, który jest przeszkolony w zakresie obserwacji i podawania mi leków w celu u?pienia mnie lub zapewnienia mi komfortu w trakcie zabiegu.  Rozumiem, ?e mój anestezjolog rachna jest pracownikiem ani przedstawicielem [facility] ani placówki s?u?by taya. Pracuje on [facility], [facility].  2. Jako pacjent prosz?cy o znieczulenie, wyra?am zgod? na:  a. Zezwolenie anestezjologowi na podanie mi leków i wykonanie dodatkowych procedur, wed?ug potrzeb. Niektóre z earnestine to: Za?o?enie doj?shelbi do?ylnego lub podanie za pomoc? takiego doj?shelbi leków, p?ynów lub krwi w trakcie zabiegu oraz wprowadzenie rurki dotchawiczej w celu u?atwienia oddychania, kiedy b?d? spa? (intubacja). Je?li moje serce przestanie prawid?owo pracowa?, rozumiem, ?e anestezjolog do?o?y wszelkich stara?, jm podtrzyma? moje ?ycie, o ile rachna otrzyma innych wskazówek na podstawie mojego sprzeciwu wobec resuscytacji znajduj?cego si? w dokumentacji [facility].  b. Poinformowanie anestezjologa przed zabiegiem o:  Ci??y.  Godzinie przyj?shelbi ostatniego posi?ku lub napoju.  Wszystkich przyjmowanych lekach [w tym wydawanych na recept?, preparatach zio?owych i lekach wydawanych bez recepty (marissa?e o narkotykach]. Niepoinformowanie anestezjologa o tych ?rodkach mo?e zwi?kszy? ryzyko wyst?pienia powik?a? zwi?zanych ze znieczuleniem.  Wszelkich uczuleniach lub niepo??danej reakcji na znieczulenie w przesz?o?ci.  3. Rozumiem, w gabino sposób pomo?e mi lek znieczulaj?cy (korzy?ci).  4. Rozumiem, ?e z ka?dym rodzajem leków znieczulaj?cych wi??? si? pewne zagro?enia:  a. Najcz?stsze zagro?enia to: nudno?ci, wymioty, ból alexus?a, bóle mi??ni, uszkodzenie oczu, jayne ustnej lub z?bów (w wyniku intubacji).  b. Rzadkie zagro?enia to: ?wiadomo?? przebiegu zabiegu, reakcje alergiczne na leki, uraz dróg oddechowych, serca, p?uc, wzroku, nerwów lub  mi??ni oraz bardzo rzadkie przypadki zgonu.  5. Lekarz wyja?ni? mi inne dost?pne opcje leczenia (alternatywy).  6. Pacjentki ci??evelyn (znieczulenie zewn?trzoponowe):  Rozumiem, ?e zagro?enia zwi?kendal ze znieczuleniem zewn?trzoponowym (podawanym w plecy w trakcie porodu) obejmuj?: ?wi?d, niskie ci?nienie krwi, trudno?ci w oddawaniu moczu, bóle g?owy lub spowolnienie cz?sto?ci akcji serca. Bardzo rzadkie zagro?enia to: zaka?enie, krwawienie, napad padaczkowy, nieregularny rytm serca i uraz nerwów.  7. Znieczulenie regionalne (kr?gos?upowe, zewn?trzoponowe i blokada nerwów):  Rozumiem, ?e rzadkie, surekha potencjalne powik?leana to: bóle g?owy, krwawienie, zaka?enie, napady padaczkowe, nieregularny rytm serca oraz uraz nerwów.  W dowolnym momencie przed otrzymaniem leku mog? zmieni? zdanie dotycz?ce znieczulenia.      ______________________________________________________________________________________  Podpis pacjenta (lub jego przedstawiciela)/pokrewie?stwo z pacjentem Data   Godzina    ______________________________________________________________________________________  Imi? i nazwisko t?umacza (je?li obecny)    J?zyk/organizacja   Godzina    ______________________________________________________________________________________  Podpis anestezjologa       Data   Godzina  Omówi?em/am z pacjentem (lub jego przedstawicielem), na czym polega zabieg oraz powy?mary informacje i udzieli?em/am odpowiedzi na pytania. Pacjent lub jego przedstawiciel wyrazi? zgod? na podanie poonam.    ______________________________________________________________________________________  ?wiadek        Data   Cuong   I have verified that the signature is that of the patient or patient’s representative, and that it was signed before the procedure    Patient Name: Dorys Pickens     : 1950                 Printed: 2024 at 12:10 PM    Medical Record #: [id number]                                            Page 3 of 3

## (undated) NOTE — LETTER
[facility] [address], [place], [place]    Authorization for Surgical Operation and Procedure                               I hereby authorize GORDO Peguero MD, my physician and his/her assistants (if applicable), which may include medical students, residents, and/or fellows, to perform the following surgical operation/ procedure and administer such anesthesia as may be determined necessary by my physician: Operation/Procedure name (s) ESOPHAGOGASTRODUODENOSCOPY (EGD) on Dorys Pickens   2.   I recognize that during the surgical operation/procedure, unforeseen conditions may necessitate additional or different procedures than those listed above.  I, therefore, further authorize and request that the above-named surgeon, assistants, or designees perform such procedures as are, in their judgment, necessary and desirable.    3.   My surgeon/physician has discussed prior to my surgery the potential benefits, risks and side effects of this procedure; the likelihood of achieving goals; and potential problems that might occur during recuperation.  They also discussed reasonable alternatives to the procedure, including risks, benefits, and side effects related to the alternatives and risks related to not receiving this procedure.  I have had all my questions answered and I acknowledge that no guarantee has been made as to the result that may be obtained.    4.   Should the need arise during my operation/procedure, which includes change of level of care prior to discharge, I also consent to the administration of blood and/or blood products.  Further, I understand that despite careful testing and screening of blood or blood products by collecting agencies, I may still be subject to ill effects as a result of receiving a blood transfusion and/or blood products.  The following are some, but not all, of the potential risks that can occur: fever and allergic reactions, hemolytic reactions, transmission of  diseases such as Hepatitis, AIDS and Cytomegalovirus (CMV) and fluid overload.  In the event that I wish to have an autologous transfusion of my own blood, or a directed donor transfusion, I will discuss this with my physician.  Check only if Refusing Blood or Blood Products  I understand refusal of blood or blood products as deemed necessary by my physician may have serious consequences to my condition to include possible death. I hereby assume responsibility for my refusal and release the hospital, its personnel, and my physicians from any responsibility for the consequences of my refusal.    o  Refuse   5.   I authorize the use of any specimen, organs, tissues, body parts or foreign objects that may be removed from my body during the operation/procedure for diagnosis, research or teaching purposes and their subsequent disposal by hospital authorities.  I also authorize the release of specimen test results and/or written reports to my treating physician on the hospital medical staff or other referring or consulting physicians involved in my care, at the discretion of the Pathologist or my treating physician.    6.   I consent to the photographing or videotaping of the operations or procedures to be performed, including appropriate portions of my body for medical, scientific, or educational purposes, provided my identity is not revealed by the pictures or by descriptive texts accompanying them.  If the procedure has been photographed/videotaped, the surgeon will obtain the original picture, image, videotape or CD.  The hospital will not be responsible for storage, release or maintenance of the picture, image, tape or CD.    7.   I consent to the presence of a  or observers in the operating room as deemed necessary by my physician or their designees.    8.   I recognize that in the event my procedure results in extended X-Ray/fluoroscopy time, I may develop a skin reaction.    9. If I have a Do Not  Attempt Resuscitation (DNAR) order in place, that status will be suspended while in the operating room, procedural suite, and during the recovery period unless otherwise explicitly stated by me (or a person authorized to consent on my behalf). The surgeon or my attending physician will determine when the applicable recovery period ends for purposes of reinstating the DNAR order.  10. Patients having a sterilization procedure: I understand that if the procedure is successful the results will be permanent and it will therefore be impossible for me to inseminate, conceive, or bear children.  I also understand that the procedure is intended to result in sterility, although the result has not been guaranteed.   11. I acknowledge that my physician has explained sedation/analgesia administration to me including the risk and benefits I consent to the administration of sedation/analgesia as may be necessary or desirable in the judgment of my physician.    I CERTIFY THAT I HAVE READ AND FULLY UNDERSTAND THE ABOVE CONSENT TO OPERATION and/or OTHER PROCEDURE.     ____________________________________  _________________________________        ______________________________  Signature of Patient    Signature of Responsible Person                Printed Name of Responsible Person                                      ____________________________________  _____________________________                ________________________________  Signature of Witness        Date  Time         Relationship to Patient    STATEMENT OF PHYSICIAN My signature below affirms that prior to the time of the procedure; I have explained to the patient and/or his/her legal representative, the risks and benefits involved in the proposed treatment and any reasonable alternative to the proposed treatment. I have also explained the risks and benefits involved in refusal of the proposed treatment and alternatives to the proposed treatment and have answered the  patient's questions. If I have a significant financial interest in a co-management agreement or a significant financial interest in any product or implant, or other significant relationship used in this procedure/surgery, I have disclosed this and had a discussion with my patient.     _____________________________________________________              _____________________________  (Signature of Physician)                                                                                         (Date)                                   (Time)  Patient Name: Dorys Martinchucktrey      : 1950      Printed: 2024     Medical Record #: [id number]                                      Page 1 of 1

## (undated) NOTE — LETTER
[facility]  Administration of Anesthesia  Dorys MURRAY agree to be cared for by a physician anesthesiologist alone and/or with a nurse anesthetist, who is specially trained to monitor me and give me medicine to put me to sleep or keep me comfortable during my procedure    I understand that my anesthesiologist and/or anesthetist is not an employee or agent of Health system or [employer]. He or she works for [facility]    As the patient asking for anesthesia services, I agree to:  Allow the anesthesiologist (anesthesia doctor) to give me medicine and do additional procedures as necessary. Some examples are: Starting or using an “IV” to give me medicine, fluids or blood during my procedure, and having a breathing tube placed to help me breathe when I’m asleep (intubation). In the event that my heart stops working properly, I understand that my anesthesiologist will make every effort to sustain my life, unless otherwise directed by Health system Do Not Resuscitate documents.  Tell my anesthesia doctor before my procedure:  If I am pregnant.  The last time that I ate or drank.  iii. All of the medicines I take (including prescriptions, herbal supplements, and pills I can buy without a prescription (including street drugs/illegal medications). Failure to inform my anesthesiologist about these medicines may increase my risk of anesthetic complications.  iv.If I am allergic to anything or have had a reaction to anesthesia before.  I understand how the anesthesia medicine will help me (benefits).  I understand that with any type of anesthesia medicine there are risks:  The most common risks are: nausea, vomiting, sore throat, muscle soreness, damage to my eyes, mouth, or teeth (from breathing tube placement).  Rare risks include: remembering what happened during my procedure, allergic reactions to medications, injury to my airway, heart, lungs, vision, nerves, or  muscles and in extremely rare instances death.  My doctor has explained to me other choices available to me for my care (alternatives).  Pregnant Patients (“epidural”):  I understand that the risks of having an epidural (medicine given into my back to help control pain during labor), include itching, low blood pressure, difficulty urinating, headache or slowing of the baby’s heart. Very rare risks include infection, bleeding, seizure, irregular heart rhythms and nerve injury.  Regional Anesthesia (“spinal”, “epidural”, & “nerve blocks”):  I understand that rare but potential complications include headache, bleeding, infection, seizure, irregular heart rhythms, and nerve injury.    _____________________________________________________________________________  Patient (or Representative) Signature/Relationship to Patient  Date   Time    _____________________________________________________________________________   Name (if used)    Language/Organization   Time    _____________________________________________________________________________  Nurse Anesthetist Signature     Date   Time  _____________________________________________________________________________  Anesthesiologist Signature     Date   Time  I have discussed the procedure and information above with the patient (or patient’s representative) and answered their questions. The patient or their representative has agreed to have anesthesia services.    _____________________________________________________________________________  Witness        Date   Time  I have verified that the signature is that of the patient or patient’s representative, and that it was signed before the procedure  Patient Name: Dorys Pickens     : 1950                 Printed: 3/21/2025 at 8:15 AM    Medical Record #: [id number]                                            Page 1 of 1  ----------ANESTHESIA CONSENT----------

## (undated) NOTE — LETTER
04/21/25      Dorys Pickens  [address] [place] [address] [place] [address]        Dear Dorys,    I reviewed the pathology report from the polyp(s) completely removed during your recent colonoscopy. The polyp(s) removed was/were an adenoma, which is a benign growth.   However, these are the types of polyps that if not removed could become a colon cancer. All of your polyps were completely removed. No need for routine screening, but if interested, can consider a colonoscopy in 7 years.    If you have further questions please call me at [phone] or message me through [email or web address].    Sincerely,  GORDO Peguero MD

## (undated) NOTE — LETTER
1/7/2022              Debbie Console        [address] [place] [address]         To Whom It May Concern,    Above named patient had a history of COVID in December of 2022. She is cleared for foreign travel due to immunity.     Sincerely,

## (undated) NOTE — LETTER
[facility]  part of [facility]     PICC INSERTION CONSENT     I agree to have a Peripherally Inserted Central Catheter (PICC) placed in my arm.   1. The PICC insertion procedure, care, maintenance, risks, benefits, and complications have been explained to me by my physician, ________________________, and I understand them.   2. I understand that this may not be the only way I can receive my medication. I understand that my physician has determined that the PICC would be the safest and most effective means of administering my medication at this time. If there are other options of giving medication into my veins those options have been explained to me by my physician and I have chosen this one.   3. I realize a nurse who has been specially trained and certified by the hospital and ’s representative to insert a PICC will perform this procedure. My catheter will be inserted by _____________________________.   4. I have been informed by my doctor of the nature and purpose of this procedure and the risks involved and the possibility of complications. I realize that this is an invasive procedure and has certain risks such as air embolism (air entering the catheter or my vein), arterial puncture (a tearing of one of my arteries), infection, irregular heartbeat and venous thrombosis (a blood clot in a vein) nerve injury and fracture of the catheter with or without migration.   5. In order to numb the area where the line will be placed, a small amount of anesthetic medication will be injected as ordered by my physician.   6. I understand that while the catheter will be placed in my upper arm the end of the catheter will come to rest in an area near my heart.     7. The person performing this procedure has discussed the potential benefits, risks, and side effects of the PICC; the likelihood of achieving goals; and potential problems that might occur during recuperation. They also discussed  reasonable alternatives to the PICC, including risks, benefits, and side effects related to the alternatives and risks related to not receiving this procedure.    8.  I have expressed any questions about this procedure to my physician or the PICC Proceduralist and he/she has answered them.  I certify that I have read and understand this consent to the insertion of a PICC.      _________________________________________________________   Date     Time     Patient/Guardian Signature       ____________________________________   Printed name of Patient/Guardian          ________________________________________________________________    Date        Time                   Witnessing RN Signature      Patient Name: Dorys Pickens     : 1950                 Printed: 2024     Medical Record #: [id number]

## (undated) NOTE — LETTER
[facility] [address], [place], [place]    Authorization for Surgical Operation and Procedure                               I hereby authorize GORDO Peguero MD, my physician and his/her assistants (if applicable), which may include medical students, residents, and/or fellows, to perform the following surgical operation/ procedure and administer such anesthesia as may be determined necessary by my physician: Operation/Procedure name (s) ESOPHAGOGASTRODUODENOSCOPY (EGD) on Dorys Pickens   2.   I recognize that during the surgical operation/procedure, unforeseen conditions may necessitate additional or different procedures than those listed above.  I, therefore, further authorize and request that the above-named surgeon, assistants, or designees perform such procedures as are, in their judgment, necessary and desirable.    3.   My surgeon/physician has discussed prior to my surgery the potential benefits, risks and side effects of this procedure; the likelihood of achieving goals; and potential problems that might occur during recuperation.  They also discussed reasonable alternatives to the procedure, including risks, benefits, and side effects related to the alternatives and risks related to not receiving this procedure.  I have had all my questions answered and I acknowledge that no guarantee has been made as to the result that may be obtained.    4.   Should the need arise during my operation/procedure, which includes change of level of care prior to discharge, I also consent to the administration of blood and/or blood products.  Further, I understand that despite careful testing and screening of blood or blood products by collecting agencies, I may still be subject to ill effects as a result of receiving a blood transfusion and/or blood products.  The following are some, but not all, of the potential risks that can occur: fever and allergic reactions, hemolytic reactions, transmission of  diseases such as Hepatitis, AIDS and Cytomegalovirus (CMV) and fluid overload.  In the event that I wish to have an autologous transfusion of my own blood, or a directed donor transfusion, I will discuss this with my physician.  Check only if Refusing Blood or Blood Products  I understand refusal of blood or blood products as deemed necessary by my physician may have serious consequences to my condition to include possible death. I hereby assume responsibility for my refusal and release the hospital, its personnel, and my physicians from any responsibility for the consequences of my refusal.    o  Refuse   5.   I authorize the use of any specimen, organs, tissues, body parts or foreign objects that may be removed from my body during the operation/procedure for diagnosis, research or teaching purposes and their subsequent disposal by hospital authorities.  I also authorize the release of specimen test results and/or written reports to my treating physician on the hospital medical staff or other referring or consulting physicians involved in my care, at the discretion of the Pathologist or my treating physician.    6.   I consent to the photographing or videotaping of the operations or procedures to be performed, including appropriate portions of my body for medical, scientific, or educational purposes, provided my identity is not revealed by the pictures or by descriptive texts accompanying them.  If the procedure has been photographed/videotaped, the surgeon will obtain the original picture, image, videotape or CD.  The hospital will not be responsible for storage, release or maintenance of the picture, image, tape or CD.    7.   I consent to the presence of a  or observers in the operating room as deemed necessary by my physician or their designees.    8.   I recognize that in the event my procedure results in extended X-Ray/fluoroscopy time, I may develop a skin reaction.    9. If I have a Do Not  Attempt Resuscitation (DNAR) order in place, that status will be suspended while in the operating room, procedural suite, and during the recovery period unless otherwise explicitly stated by me (or a person authorized to consent on my behalf). The surgeon or my attending physician will determine when the applicable recovery period ends for purposes of reinstating the DNAR order.  10. Patients having a sterilization procedure: I understand that if the procedure is successful the results will be permanent and it will therefore be impossible for me to inseminate, conceive, or bear children.  I also understand that the procedure is intended to result in sterility, although the result has not been guaranteed.   11. I acknowledge that my physician has explained sedation/analgesia administration to me including the risk and benefits I consent to the administration of sedation/analgesia as may be necessary or desirable in the judgment of my physician.    I CERTIFY THAT I HAVE READ AND FULLY UNDERSTAND THE ABOVE CONSENT TO OPERATION and/or OTHER PROCEDURE.     ____________________________________  _________________________________        ______________________________  Signature of Patient    Signature of Responsible Person                Printed Name of Responsible Person                                      ____________________________________  _____________________________                ________________________________  Signature of Witness        Date  Time         Relationship to Patient    STATEMENT OF PHYSICIAN My signature below affirms that prior to the time of the procedure; I have explained to the patient and/or his/her legal representative, the risks and benefits involved in the proposed treatment and any reasonable alternative to the proposed treatment. I have also explained the risks and benefits involved in refusal of the proposed treatment and alternatives to the proposed treatment and have answered the  patient's questions. If I have a significant financial interest in a co-management agreement or a significant financial interest in any product or implant, or other significant relationship used in this procedure/surgery, I have disclosed this and had a discussion with my patient.     _____________________________________________________              _____________________________  (Signature of Physician)                                                                                         (Date)                                   (Time)  Patient Name: Dorys Martinchucktrey      : 1950      Printed: 2024     Medical Record #: [id number]                                      Page 1 of 1

## (undated) NOTE — LETTER
[facility]  Administration of Anesthesia  Dorys MURRAY agree to be cared for by a physician anesthesiologist alone and/or with a nurse anesthetist, who is specially trained to monitor me and give me medicine to put me to sleep or keep me comfortable during my procedure    I understand that my anesthesiologist and/or anesthetist is not an employee or agent of [facility] or [employer]. He or she works for [facility]    As the patient asking for anesthesia services, I agree to:  Allow the anesthesiologist (anesthesia doctor) to give me medicine and do additional procedures as necessary. Some examples are: Starting or using an “IV” to give me medicine, fluids or blood during my procedure, and having a breathing tube placed to help me breathe when I’m asleep (intubation). In the event that my heart stops working properly, I understand that my anesthesiologist will make every effort to sustain my life, unless otherwise directed by [facility] Do Not Resuscitate documents.  Tell my anesthesia doctor before my procedure:  If I am pregnant.  The last time that I ate or drank.  iii. All of the medicines I take (including prescriptions, herbal supplements, and pills I can buy without a prescription (including street drugs/illegal medications). Failure to inform my anesthesiologist about these medicines may increase my risk of anesthetic complications.  iv.If I am allergic to anything or have had a reaction to anesthesia before.  I understand how the anesthesia medicine will help me (benefits).  I understand that with any type of anesthesia medicine there are risks:  The most common risks are: nausea, vomiting, sore throat, muscle soreness, damage to my eyes, mouth, or teeth (from breathing tube placement).  Rare risks include: remembering what happened during my procedure, allergic reactions to medications, injury to my airway, heart, lungs, vision, nerves, or  muscles and in extremely rare instances death.  My doctor has explained to me other choices available to me for my care (alternatives).  Pregnant Patients (“epidural”):  I understand that the risks of having an epidural (medicine given into my back to help control pain during labor), include itching, low blood pressure, difficulty urinating, headache or slowing of the baby’s heart. Very rare risks include infection, bleeding, seizure, irregular heart rhythms and nerve injury.  Regional Anesthesia (“spinal”, “epidural”, & “nerve blocks”):  I understand that rare but potential complications include headache, bleeding, infection, seizure, irregular heart rhythms, and nerve injury.    _____________________________________________________________________________  Patient (or Representative) Signature/Relationship to Patient  Date   Time    _____________________________________________________________________________   Name (if used)    Language/Organization   Time    _____________________________________________________________________________  Nurse Anesthetist Signature     Date   Time  _____________________________________________________________________________  Anesthesiologist Signature     Date   Time  I have discussed the procedure and information above with the patient (or patient’s representative) and answered their questions. The patient or their representative has agreed to have anesthesia services.    _____________________________________________________________________________  Witness        Date   Time  I have verified that the signature is that of the patient or patient’s representative, and that it was signed before the procedure  Patient Name: Dorys Pickens     : 1950                 Printed: 2024 at 11:09 AM    Medical Record #: [id number]                                            Page 1 of 1  ----------ANESTHESIA CONSENT----------

## (undated) NOTE — LETTER
[facility]  Administration of Anesthesia  Dorys MURRAY agree to be cared for by a physician anesthesiologist alone and/or with a nurse anesthetist, who is specially trained to monitor me and give me medicine to put me to sleep or keep me comfortable during my procedure    I understand that my anesthesiologist and/or anesthetist is not an employee or agent of [facility] or [employer]. He or she works for [facility]    As the patient asking for anesthesia services, I agree to:  Allow the anesthesiologist (anesthesia doctor) to give me medicine and do additional procedures as necessary. Some examples are: Starting or using an “IV” to give me medicine, fluids or blood during my procedure, and having a breathing tube placed to help me breathe when I’m asleep (intubation). In the event that my heart stops working properly, I understand that my anesthesiologist will make every effort to sustain my life, unless otherwise directed by [facility] Do Not Resuscitate documents.  Tell my anesthesia doctor before my procedure:  If I am pregnant.  The last time that I ate or drank.  iii. All of the medicines I take (including prescriptions, herbal supplements, and pills I can buy without a prescription (including street drugs/illegal medications). Failure to inform my anesthesiologist about these medicines may increase my risk of anesthetic complications.  iv.If I am allergic to anything or have had a reaction to anesthesia before.  I understand how the anesthesia medicine will help me (benefits).  I understand that with any type of anesthesia medicine there are risks:  The most common risks are: nausea, vomiting, sore throat, muscle soreness, damage to my eyes, mouth, or teeth (from breathing tube placement).  Rare risks include: remembering what happened during my procedure, allergic reactions to medications, injury to my airway, heart, lungs, vision, nerves, or  muscles and in extremely rare instances death.  My doctor has explained to me other choices available to me for my care (alternatives).  Pregnant Patients (“epidural”):  I understand that the risks of having an epidural (medicine given into my back to help control pain during labor), include itching, low blood pressure, difficulty urinating, headache or slowing of the baby’s heart. Very rare risks include infection, bleeding, seizure, irregular heart rhythms and nerve injury.  Regional Anesthesia (“spinal”, “epidural”, & “nerve blocks”):  I understand that rare but potential complications include headache, bleeding, infection, seizure, irregular heart rhythms, and nerve injury.    _____________________________________________________________________________  Patient (or Representative) Signature/Relationship to Patient  Date   Time    _____________________________________________________________________________   Name (if used)    Language/Organization   Time    _____________________________________________________________________________  Nurse Anesthetist Signature     Date   Time  _____________________________________________________________________________  Anesthesiologist Signature     Date   Time  I have discussed the procedure and information above with the patient (or patient’s representative) and answered their questions. The patient or their representative has agreed to have anesthesia services.    _____________________________________________________________________________  Witness        Date   Time  I have verified that the signature is that of the patient or patient’s representative, and that it was signed before the procedure  Patient Name: Dorys Pickens     : 1950                 Printed: 2024 at 11:07 AM    Medical Record #: [id number]                                            Page 1 of 1  ----------ANESTHESIA CONSENT----------